# Patient Record
Sex: FEMALE | Race: WHITE | NOT HISPANIC OR LATINO | Employment: PART TIME | URBAN - METROPOLITAN AREA
[De-identification: names, ages, dates, MRNs, and addresses within clinical notes are randomized per-mention and may not be internally consistent; named-entity substitution may affect disease eponyms.]

---

## 2017-08-15 ENCOUNTER — ALLSCRIPTS OFFICE VISIT (OUTPATIENT)
Dept: OTHER | Facility: OTHER | Age: 42
End: 2017-08-15

## 2017-08-15 DIAGNOSIS — E78.2 MIXED HYPERLIPIDEMIA: ICD-10-CM

## 2018-01-12 NOTE — PROGRESS NOTES
Assessment    1  Back pain, chronic (724 5,338 29) (M54 9,G89 29)   2  Migraine headache (346 90) (G43 909)   3  Depression (311) (F32 9)   4  Mixed hyperlipidemia (272 2) (E78 2)    Plan  Anxiety disorder    · LORazepam 2 MG Oral Tablet (Ativan); TAKE 1 TABLET 4 TIMES DAILY   · Sertraline HCl - 100 MG Oral Tablet; TAKE 2 TABLETS EVERY DAY  Health Maintenance    · Follow-up visit in 6 months Evaluation and Treatment  Follow-up  Status: Complete   Done: [id number]  Mixed hyperlipidemia    · (1) CBC/PLT/DIFF; Status:Active; Requested for:66Beb6905;    · (1) COMPREHENSIVE METABOLIC PANEL; Status:Active; Requested for:54Orz0959;    · (1) LIPID PANEL, FASTING; Status:Active; Requested for:35Zrn9341;   Weight loss    · (Q) TSH, 3RD GENERATION; Status:Active; Requested for:23Pai8273;     Chief Complaint  Patient is here today for annual physical  No complaint of pain  Patient is requesting prescription for lorazepam       History of Present Illness  HPI: Patient comes in for a checkup  Review of Systems    Constitutional: No fever, no chills, feels well, no tiredness, no recent weight gain or weight loss  Cardiovascular: No complaints of slow heart rate, no fast heart rate, no chest pain, no palpitations, no leg claudication, no lower extremity edema  Respiratory: No complaints of shortness of breath, no wheezing, no cough, no SOB on exertion, no orthopnea, no PND  Active Problems    1  Acid reflux disease (530 81) (K21 9)   2  Anxiety disorder (300 00) (F41 9)   3  Back pain, chronic (724 5,338 29) (M54 9,G89 29)   4  Chronic pain (338 29) (G89 29)   5  Depression (311) (F32 9)   6  Encounter for screening mammogram for breast cancer (V76 12) (Z12 31)   7  Inguinal hernia (550 90) (K40 90)   8  Irritable bowel syndrome (564 1) (K58 9)   9  Migraine headache (346 90) (G43 909)   10  Mixed hyperlipidemia (272 2) (E78 2)   11  Multiple sclerosis (340) (G35)   12   Need for influenza vaccination (V04 81) (Z23) 13  Need for pneumococcal vaccination (V03 82) (Z23)   14  Need for Tdap vaccination (V06 1) (Z23)   15  Refractory migraine (346 81) (G43 919)   16  Unilateral femoral hernia without obstruction or gangrene (553 00) (K41 90)   17  Vaginal smear (V76 47) (Z12 72)   18  Visit for screening mammogram (V76 12) (Z12 31)   19  Weight loss (783 21) (R63 4)    Past Medical History    · History of Need for influenza vaccination (V04 81) (Z23)    Surgical History    · History of Eye Surgery    Family History  Mother    · Family history of coronary artery disease (V17 3) (Z82 49)   · Family history of myocardial infarction (V17 3) (Z82 49)   · Denied: Family history of substance abuse  Father    · Family history of malignant neoplasm of urinary bladder (V16 52) (Z80 52)   · Denied: Family history of mental disorder  Sister    · Family history of    · Family history of multiple sclerosis (V17 2) (Z82 0)    Social History    · Denied: History of Alcohol use   · Always uses seat belt   · Current every day smoker (305 1) (F17 200)   · Full-time employment   · Lives with    ·     Current Meds   1  LORazepam 2 MG Oral Tablet; TAKE 1 TABLET 4 TIMES DAILY; Therapy: 29YGI5923 to (Evaluate:00Szp2405)  Requested for: 33Jnu0337; Last   Rx:74Exr2087 Ordered   2  Sertraline HCl - 100 MG Oral Tablet; TAKE 2 TABLETS EVERY DAY; Therapy: 36XFW4603 to (Evaluate:96Pxj1079)  Requested for: 68Yfr9148; Last   Rx:89Nqh4203 Ordered   3  TraMADol HCl - 50 MG Oral Tablet; TAKE 1 TO 2 TABLETS EVERY 4 TO 6 HOURS AS   NEEDED; Therapy: 58CWF8647 to (Evaluate:22Wqr3785)  Requested for: 28EIU2113; Last   Rx:28Bbc1474 Ordered    Allergies    1   Sulfa Drugs    Vitals   Recorded: 2017 10:26AM   Heart Rate 64   Systolic 703   Diastolic 62   Height 4 ft 7 5 in   Weight 77 lb 2 oz   BMI Calculated 17 6   BSA Calculated 1 18   O2 Saturation 98     Physical Exam    Constitutional   General appearance: No acute distress, well appearing and well nourished  Eyes   Conjunctiva and lids: No swelling, erythema or discharge  Pupils and irises: Equal, round and reactive to light  Ears, Nose, Mouth, and Throat   External inspection of ears and nose: Normal     Otoscopic examination: Tympanic membranes translucent with normal light reflex  Canals patent without erythema  Oropharynx: Normal with no erythema, edema, exudate or lesions  Pulmonary   Respiratory effort: No increased work of breathing or signs of respiratory distress  Auscultation of lungs: Clear to auscultation  Cardiovascular   Auscultation of heart: Normal rate and rhythm, normal S1 and S2, without murmurs  Examination of extremities for edema and/or varicosities: Normal     Abdomen   Abdomen: Non-tender, no masses  Liver and spleen: No hepatomegaly or splenomegaly  Lymphatic   Palpation of lymph nodes in neck: No lymphadenopathy  Musculoskeletal   Gait and station: Normal     Digits and nails: Normal without clubbing or cyanosis  Inspection/palpation of joints, bones, and muscles: Normal     Skin   Skin and subcutaneous tissue: Normal without rashes or lesions  Neurologic   Cranial nerves: Cranial nerves 2-12 intact  Reflexes: 2+ and symmetric  Sensation: No sensory loss  Psychiatric   Orientation to person, place, and time: Normal     Mood and affect: Normal        Results/Data  PHQ-9 Adult Depression Screening 40Pbq9082 11:50AM User, s     Test Name Result Flag Reference   PHQ-9 Adult Depression Score 6     Over the last two weeks, how often have you been bothered by any of the following problems?   Little interest or pleasure in doing things: Not at all - 0  Feeling down, depressed, or hopeless: Several days - 1  Trouble falling or staying asleep, or sleeping too much: Nearly every day - 3  Feeling tired or having little energy: Not at all - 0  Poor appetite or over eating: Not at all - 0  Feeling bad about yourself - or that you are a failure or have let yourself or your family down: Several days - 1  Trouble concentrating on things, such as reading the newspaper or watching television: Not at all - 0  Moving or speaking so slowly that other people could have noticed  Or the opposite -  being so fidgety or restless that you have been moving around a lot more than usual: Several days - 1  Thoughts that you would be better off dead, or of hurting yourself in some way: Not at all - 0   PHQ-9 Adult Depression Screening Negative     PHQ-9 Difficulty Level Somewhat difficult     PHQ-9 Severity Mild Depression         Future Appointments    Date/Time Provider Specialty Site   02/13/2018 09:30 AM MICHAEL Toney   Family Medicine [address]     Signatures   Electronically signed by : MICHAEL Millan ; Aug 15 2017 12:21PM EST                       (Author)

## 2018-01-13 VITALS
BODY MASS INDEX: 17.35 KG/M2 | SYSTOLIC BLOOD PRESSURE: 100 MMHG | HEIGHT: 56 IN | HEART RATE: 64 BPM | DIASTOLIC BLOOD PRESSURE: 62 MMHG | OXYGEN SATURATION: 98 % | WEIGHT: 77.13 LBS

## 2018-02-08 ENCOUNTER — TELEPHONE (OUTPATIENT)
Dept: FAMILY MEDICINE CLINIC | Facility: CLINIC | Age: 43
End: 2018-02-08

## 2018-02-08 DIAGNOSIS — F41.9 ANXIETY: Primary | ICD-10-CM

## 2018-02-08 RX ORDER — LORAZEPAM 2 MG/1
2 TABLET ORAL 4 TIMES DAILY
Refills: 1 | COMMUNITY
Start: 2017-11-24 | End: 2018-02-08

## 2018-02-08 RX ORDER — LORAZEPAM 2 MG/1
2 TABLET ORAL 3 TIMES DAILY
Qty: 90 TABLET | Refills: 0 | Status: SHIPPED | OUTPATIENT
Start: 2018-02-08 | End: 2018-03-13 | Stop reason: SDUPTHER

## 2018-02-13 RX ORDER — SERTRALINE HYDROCHLORIDE 100 MG/1
200 TABLET, FILM COATED ORAL DAILY
Refills: 5 | COMMUNITY
Start: 2017-11-21 | End: 2018-05-01 | Stop reason: SDUPTHER

## 2018-02-13 RX ORDER — TRAMADOL HYDROCHLORIDE 50 MG/1
1-2 TABLET ORAL
COMMUNITY
Start: 2014-09-25 | End: 2018-05-01 | Stop reason: SDUPTHER

## 2018-03-13 DIAGNOSIS — F41.9 ANXIETY: ICD-10-CM

## 2018-03-13 RX ORDER — LORAZEPAM 2 MG/1
2 TABLET ORAL EVERY 6 HOURS PRN
Qty: 90 TABLET | Refills: 0 | Status: SHIPPED | OUTPATIENT
Start: 2018-03-13 | End: 2018-04-10 | Stop reason: SDUPTHER

## 2018-04-10 ENCOUNTER — OFFICE VISIT (OUTPATIENT)
Dept: FAMILY MEDICINE CLINIC | Facility: CLINIC | Age: 43
End: 2018-04-10
Payer: COMMERCIAL

## 2018-04-10 VITALS
HEART RATE: 75 BPM | WEIGHT: 71.4 LBS | HEIGHT: 56 IN | SYSTOLIC BLOOD PRESSURE: 112 MMHG | OXYGEN SATURATION: 97 % | DIASTOLIC BLOOD PRESSURE: 80 MMHG | BODY MASS INDEX: 16.06 KG/M2

## 2018-04-10 DIAGNOSIS — F32.A DEPRESSION, UNSPECIFIED DEPRESSION TYPE: ICD-10-CM

## 2018-04-10 DIAGNOSIS — Z72.0 TOBACCO USE: ICD-10-CM

## 2018-04-10 DIAGNOSIS — M54.5 CHRONIC LOW BACK PAIN, UNSPECIFIED BACK PAIN LATERALITY, WITH SCIATICA PRESENCE UNSPECIFIED: ICD-10-CM

## 2018-04-10 DIAGNOSIS — G43.909 MIGRAINE WITHOUT STATUS MIGRAINOSUS, NOT INTRACTABLE, UNSPECIFIED MIGRAINE TYPE: ICD-10-CM

## 2018-04-10 DIAGNOSIS — E78.2 MIXED HYPERLIPIDEMIA: ICD-10-CM

## 2018-04-10 DIAGNOSIS — G89.29 CHRONIC LOW BACK PAIN, UNSPECIFIED BACK PAIN LATERALITY, WITH SCIATICA PRESENCE UNSPECIFIED: ICD-10-CM

## 2018-04-10 DIAGNOSIS — K58.9 IRRITABLE BOWEL SYNDROME, UNSPECIFIED TYPE: ICD-10-CM

## 2018-04-10 DIAGNOSIS — F41.9 ANXIETY: ICD-10-CM

## 2018-04-10 DIAGNOSIS — R63.4 WEIGHT LOSS: Primary | ICD-10-CM

## 2018-04-10 PROCEDURE — 99214 OFFICE O/P EST MOD 30 MIN: CPT | Performed by: FAMILY MEDICINE

## 2018-04-10 RX ORDER — LORAZEPAM 2 MG/1
2 TABLET ORAL EVERY 6 HOURS PRN
Qty: 120 TABLET | Refills: 0 | Status: SHIPPED | OUTPATIENT
Start: 2018-04-10 | End: 2018-05-01 | Stop reason: SDUPTHER

## 2018-04-10 NOTE — PROGRESS NOTES
Assessment/Plan:           Problem List Items Addressed This Visit     Depression    Relevant Medications    LORazepam (ATIVAN) 2 mg tablet    Migraine headache    Mixed hyperlipidemia    Relevant Orders    Lipid panel    Back pain, chronic    Tobacco use    Weight loss - Primary    Relevant Orders    CBC and differential    Comprehensive metabolic panel    TSH, 3rd generation with T4 reflex    XR chest pa & lateral    Irritable bowel syndrome    Anxiety    Relevant Medications    LORazepam (ATIVAN) 2 mg tablet            Subjective:      Patient ID: Leta Burris is a 36 y o  female  Patient comes in for a checkup  She is losing weight and she is not sure why her anxiety and depression are stable as are her migraines  Her IBS is about the same        The following portions of the patient's history were reviewed and updated as appropriate: allergies, current medications, past family history, past medical history, past social history, past surgical history and problem list     Review of Systems   Constitutional: Negative  HENT: Negative  Respiratory: Negative  Cardiovascular: Negative  Objective:      /80   Pulse 75   Ht 4' 7 5" (1 41 m)   Wt 32 4 kg (71 lb 6 4 oz)   SpO2 97%   BMI 16 30 kg/m²          Physical Exam   Constitutional: She is oriented to person, place, and time  She appears well-developed  HENT:   Head: Normocephalic and atraumatic  Mouth/Throat: Oropharynx is clear and moist    Eyes: EOM are normal  Pupils are equal, round, and reactive to light  Neck: Neck supple  No thyromegaly present  Cardiovascular: Normal rate, regular rhythm and normal heart sounds  Pulmonary/Chest: Effort normal and breath sounds normal    Abdominal: Soft  Musculoskeletal: Normal range of motion  Lymphadenopathy:     She has no cervical adenopathy  Neurological: She is alert and oriented to person, place, and time  Skin: Skin is warm and dry     Psychiatric: She has a normal mood and affect  Nursing note and vitals reviewed

## 2018-04-10 NOTE — PATIENT INSTRUCTIONS
We will call the results of her testing  She will let us know if she develops other constitutional symptoms with regard to her weight loss

## 2018-04-10 NOTE — PROGRESS NOTES
Assessment/Plan:    No problem-specific Assessment & Plan notes found for this encounter  {Assess/PlanSmartLinks:03938}      Subjective:      Patient ID: Dylon Briones is a 36 y o  female      HPI    {Common ambulatory SmartLinks:72489}    Review of Systems      Objective:      /80   Pulse 75   Ht 4' 7 5" (1 41 m)   Wt 32 4 kg (71 lb 6 4 oz)   SpO2 97%   BMI 16 30 kg/m²          Physical Exam

## 2018-04-24 ENCOUNTER — TELEPHONE (OUTPATIENT)
Dept: FAMILY MEDICINE CLINIC | Facility: CLINIC | Age: 43
End: 2018-04-24

## 2018-04-24 NOTE — TELEPHONE ENCOUNTER
We scheduled an appointment for next Tuesday  She missed her last appointment because she was so sick  She had blood in her stool and it was tarry black  It is clearing up now  Is there any additional labs you want done before her appt on Tuesday?   She said she has had this problem in the past

## 2018-05-01 ENCOUNTER — OFFICE VISIT (OUTPATIENT)
Dept: FAMILY MEDICINE CLINIC | Facility: CLINIC | Age: 43
End: 2018-05-01
Payer: COMMERCIAL

## 2018-05-01 VITALS
DIASTOLIC BLOOD PRESSURE: 80 MMHG | SYSTOLIC BLOOD PRESSURE: 110 MMHG | HEART RATE: 80 BPM | OXYGEN SATURATION: 94 % | WEIGHT: 71 LBS | TEMPERATURE: 99.2 F | BODY MASS INDEX: 15.97 KG/M2 | HEIGHT: 56 IN

## 2018-05-01 DIAGNOSIS — K62.5 BLOOD PER RECTUM: ICD-10-CM

## 2018-05-01 DIAGNOSIS — M54.5 CHRONIC LOW BACK PAIN, UNSPECIFIED BACK PAIN LATERALITY, WITH SCIATICA PRESENCE UNSPECIFIED: Primary | ICD-10-CM

## 2018-05-01 DIAGNOSIS — N91.2 AMENORRHEA: ICD-10-CM

## 2018-05-01 DIAGNOSIS — G89.29 CHRONIC LOW BACK PAIN, UNSPECIFIED BACK PAIN LATERALITY, WITH SCIATICA PRESENCE UNSPECIFIED: Primary | ICD-10-CM

## 2018-05-01 DIAGNOSIS — F41.9 ANXIETY: ICD-10-CM

## 2018-05-01 PROCEDURE — 3008F BODY MASS INDEX DOCD: CPT | Performed by: FAMILY MEDICINE

## 2018-05-01 PROCEDURE — 99214 OFFICE O/P EST MOD 30 MIN: CPT | Performed by: FAMILY MEDICINE

## 2018-05-01 RX ORDER — LORAZEPAM 2 MG/1
2 TABLET ORAL EVERY 6 HOURS PRN
Qty: 120 TABLET | Refills: 0 | Status: SHIPPED | OUTPATIENT
Start: 2018-05-01 | End: 2018-06-19 | Stop reason: SDUPTHER

## 2018-05-01 RX ORDER — SERTRALINE HYDROCHLORIDE 100 MG/1
200 TABLET, FILM COATED ORAL DAILY
Qty: 60 TABLET | Refills: 5 | Status: SHIPPED | OUTPATIENT
Start: 2018-05-01 | End: 2018-12-06 | Stop reason: SDUPTHER

## 2018-05-01 RX ORDER — TRAMADOL HYDROCHLORIDE 50 MG/1
TABLET ORAL
Qty: 180 TABLET | Refills: 0 | Status: SHIPPED | OUTPATIENT
Start: 2018-05-01 | End: 2018-06-19 | Stop reason: SDUPTHER

## 2018-05-01 NOTE — PROGRESS NOTES
Assessment/Plan:           Problem List Items Addressed This Visit     Back pain, chronic - Primary    Relevant Medications    traMADol (ULTRAM) 50 mg tablet    Anxiety    Relevant Medications    LORazepam (ATIVAN) 2 mg tablet    sertraline (ZOLOFT) 100 mg tablet    Blood per rectum    Relevant Orders    Ambulatory referral to Gastroenterology    Amenorrhea    Relevant Orders    FSH and LH    Pregnancy Test (HCG Qualitative)            Subjective:      Patient ID: Ai Villegas is a 36 y o  female  Patient comes in after having a 3 history of bleeding per rectum with loose stools then black tar his stools  She has had similar problems in the past   She also notes a 3 month history of amenorrhea        The following portions of the patient's history were reviewed and updated as appropriate: allergies, current medications, past family history, past medical history, past social history, past surgical history and problem list     Review of Systems   Constitutional: Negative  HENT: Negative  Respiratory: Negative  Cardiovascular: Negative  Objective:      /80   Pulse 80   Temp 99 2 °F (37 3 °C)   Ht 4' 7 5" (1 41 m)   Wt 32 2 kg (71 lb)   SpO2 94%   BMI 16 21 kg/m²          Physical Exam   Constitutional: She is oriented to person, place, and time  She appears well-developed  HENT:   Head: Normocephalic and atraumatic  Mouth/Throat: Oropharynx is clear and moist    Eyes: EOM are normal  Pupils are equal, round, and reactive to light  Neck: Neck supple  Cardiovascular: Normal rate, regular rhythm and normal heart sounds  Pulmonary/Chest: Effort normal and breath sounds normal    Abdominal: Soft  Bowel sounds are normal  There is tenderness ( epigastrium)  Musculoskeletal: Normal range of motion  Lymphadenopathy:     She has no cervical adenopathy  Neurological: She is alert and oriented to person, place, and time  Skin: Skin is warm and dry     Psychiatric: She has a normal mood and affect

## 2018-06-19 DIAGNOSIS — G89.29 CHRONIC LOW BACK PAIN, UNSPECIFIED BACK PAIN LATERALITY, WITH SCIATICA PRESENCE UNSPECIFIED: ICD-10-CM

## 2018-06-19 DIAGNOSIS — F41.9 ANXIETY: ICD-10-CM

## 2018-06-19 DIAGNOSIS — M54.5 CHRONIC LOW BACK PAIN, UNSPECIFIED BACK PAIN LATERALITY, WITH SCIATICA PRESENCE UNSPECIFIED: ICD-10-CM

## 2018-06-19 RX ORDER — TRAMADOL HYDROCHLORIDE 50 MG/1
TABLET ORAL
Qty: 180 TABLET | Refills: 0 | Status: SHIPPED | OUTPATIENT
Start: 2018-06-19 | End: 2018-06-21 | Stop reason: SDUPTHER

## 2018-06-19 RX ORDER — LORAZEPAM 2 MG/1
2 TABLET ORAL EVERY 6 HOURS PRN
Qty: 120 TABLET | Refills: 0 | Status: SHIPPED | OUTPATIENT
Start: 2018-06-19 | End: 2018-06-21 | Stop reason: SDUPTHER

## 2018-06-21 DIAGNOSIS — M54.5 CHRONIC LOW BACK PAIN, UNSPECIFIED BACK PAIN LATERALITY, WITH SCIATICA PRESENCE UNSPECIFIED: ICD-10-CM

## 2018-06-21 DIAGNOSIS — G89.29 CHRONIC LOW BACK PAIN, UNSPECIFIED BACK PAIN LATERALITY, WITH SCIATICA PRESENCE UNSPECIFIED: ICD-10-CM

## 2018-06-21 DIAGNOSIS — F41.9 ANXIETY: ICD-10-CM

## 2018-06-21 RX ORDER — TRAMADOL HYDROCHLORIDE 50 MG/1
TABLET ORAL
Qty: 30 TABLET | Refills: 0 | Status: SHIPPED | OUTPATIENT
Start: 2018-06-21 | End: 2018-08-29 | Stop reason: SDUPTHER

## 2018-06-21 RX ORDER — LORAZEPAM 2 MG/1
2 TABLET ORAL EVERY 6 HOURS PRN
Qty: 20 TABLET | Refills: 0 | Status: SHIPPED | OUTPATIENT
Start: 2018-06-21 | End: 2018-07-31 | Stop reason: SDUPTHER

## 2018-06-28 ENCOUNTER — TELEPHONE (OUTPATIENT)
Dept: FAMILY MEDICINE CLINIC | Facility: CLINIC | Age: 43
End: 2018-06-28

## 2018-06-28 NOTE — TELEPHONE ENCOUNTER
PT is having difficulties arranging transportation to here to  her rx's  Wondering if you could escribe instead?     Lorazepam and tramodol

## 2018-06-29 ENCOUNTER — TELEPHONE (OUTPATIENT)
Dept: FAMILY MEDICINE CLINIC | Facility: CLINIC | Age: 43
End: 2018-06-29

## 2018-07-31 DIAGNOSIS — F41.9 ANXIETY: ICD-10-CM

## 2018-07-31 RX ORDER — LORAZEPAM 2 MG/1
2 TABLET ORAL EVERY 6 HOURS PRN
Qty: 120 TABLET | Refills: 0 | Status: SHIPPED | OUTPATIENT
Start: 2018-07-31 | End: 2018-09-05 | Stop reason: SDUPTHER

## 2018-08-29 DIAGNOSIS — M54.5 CHRONIC LOW BACK PAIN, UNSPECIFIED BACK PAIN LATERALITY, WITH SCIATICA PRESENCE UNSPECIFIED: ICD-10-CM

## 2018-08-29 DIAGNOSIS — G89.29 CHRONIC LOW BACK PAIN, UNSPECIFIED BACK PAIN LATERALITY, WITH SCIATICA PRESENCE UNSPECIFIED: ICD-10-CM

## 2018-08-30 RX ORDER — TRAMADOL HYDROCHLORIDE 50 MG/1
TABLET ORAL
Qty: 30 TABLET | Refills: 0 | Status: SHIPPED | OUTPATIENT
Start: 2018-08-30 | End: 2018-09-05 | Stop reason: SDUPTHER

## 2018-09-05 DIAGNOSIS — F41.9 ANXIETY: ICD-10-CM

## 2018-09-05 DIAGNOSIS — G89.29 CHRONIC LOW BACK PAIN, UNSPECIFIED BACK PAIN LATERALITY, WITH SCIATICA PRESENCE UNSPECIFIED: ICD-10-CM

## 2018-09-05 DIAGNOSIS — M54.5 CHRONIC LOW BACK PAIN, UNSPECIFIED BACK PAIN LATERALITY, WITH SCIATICA PRESENCE UNSPECIFIED: ICD-10-CM

## 2018-09-06 RX ORDER — LORAZEPAM 2 MG/1
2 TABLET ORAL EVERY 6 HOURS PRN
Qty: 120 TABLET | Refills: 0 | Status: SHIPPED | OUTPATIENT
Start: 2018-09-06 | End: 2018-10-18 | Stop reason: SDUPTHER

## 2018-09-06 RX ORDER — TRAMADOL HYDROCHLORIDE 50 MG/1
TABLET ORAL
Qty: 30 TABLET | Refills: 0 | Status: SHIPPED | OUTPATIENT
Start: 2018-09-06 | End: 2018-09-18 | Stop reason: SDUPTHER

## 2018-09-18 DIAGNOSIS — G89.29 CHRONIC LOW BACK PAIN, UNSPECIFIED BACK PAIN LATERALITY, WITH SCIATICA PRESENCE UNSPECIFIED: ICD-10-CM

## 2018-09-18 DIAGNOSIS — M54.5 CHRONIC LOW BACK PAIN, UNSPECIFIED BACK PAIN LATERALITY, WITH SCIATICA PRESENCE UNSPECIFIED: ICD-10-CM

## 2018-09-18 RX ORDER — TRAMADOL HYDROCHLORIDE 50 MG/1
TABLET ORAL
Qty: 30 TABLET | Refills: 0 | Status: SHIPPED | OUTPATIENT
Start: 2018-09-18 | End: 2018-09-27 | Stop reason: SDUPTHER

## 2018-09-27 DIAGNOSIS — M54.5 CHRONIC LOW BACK PAIN, UNSPECIFIED BACK PAIN LATERALITY, WITH SCIATICA PRESENCE UNSPECIFIED: ICD-10-CM

## 2018-09-27 DIAGNOSIS — G89.29 CHRONIC LOW BACK PAIN, UNSPECIFIED BACK PAIN LATERALITY, WITH SCIATICA PRESENCE UNSPECIFIED: ICD-10-CM

## 2018-09-27 RX ORDER — TRAMADOL HYDROCHLORIDE 50 MG/1
TABLET ORAL
Qty: 30 TABLET | Refills: 0 | Status: SHIPPED | OUTPATIENT
Start: 2018-09-27 | End: 2018-10-10 | Stop reason: SDUPTHER

## 2018-10-09 DIAGNOSIS — G89.29 CHRONIC LOW BACK PAIN, UNSPECIFIED BACK PAIN LATERALITY, WITH SCIATICA PRESENCE UNSPECIFIED: ICD-10-CM

## 2018-10-09 DIAGNOSIS — M54.5 CHRONIC LOW BACK PAIN, UNSPECIFIED BACK PAIN LATERALITY, WITH SCIATICA PRESENCE UNSPECIFIED: ICD-10-CM

## 2018-10-09 RX ORDER — TRAMADOL HYDROCHLORIDE 50 MG/1
TABLET ORAL
Qty: 30 TABLET | Refills: 0 | OUTPATIENT
Start: 2018-10-09

## 2018-10-10 DIAGNOSIS — M54.5 CHRONIC LOW BACK PAIN, UNSPECIFIED BACK PAIN LATERALITY, WITH SCIATICA PRESENCE UNSPECIFIED: ICD-10-CM

## 2018-10-10 DIAGNOSIS — G89.29 CHRONIC LOW BACK PAIN, UNSPECIFIED BACK PAIN LATERALITY, WITH SCIATICA PRESENCE UNSPECIFIED: ICD-10-CM

## 2018-10-10 RX ORDER — TRAMADOL HYDROCHLORIDE 50 MG/1
TABLET ORAL
Qty: 5 TABLET | Refills: 0 | Status: SHIPPED | OUTPATIENT
Start: 2018-10-10 | End: 2018-10-11 | Stop reason: SDUPTHER

## 2018-10-10 NOTE — TELEPHONE ENCOUNTER
Patient made an appt , but is out of Tramadol , can you give her a little- appt 10/18/18? She is out today and BD is gone for the day would you do?   [facility]

## 2018-10-11 DIAGNOSIS — G89.29 CHRONIC LOW BACK PAIN, UNSPECIFIED BACK PAIN LATERALITY, WITH SCIATICA PRESENCE UNSPECIFIED: ICD-10-CM

## 2018-10-11 DIAGNOSIS — M54.5 CHRONIC LOW BACK PAIN, UNSPECIFIED BACK PAIN LATERALITY, WITH SCIATICA PRESENCE UNSPECIFIED: ICD-10-CM

## 2018-10-11 RX ORDER — TRAMADOL HYDROCHLORIDE 50 MG/1
TABLET ORAL
Qty: 100 TABLET | Refills: 0 | Status: SHIPPED | OUTPATIENT
Start: 2018-10-11 | End: 2018-11-12 | Stop reason: SDUPTHER

## 2018-10-11 RX ORDER — TRAMADOL HYDROCHLORIDE 50 MG/1
TABLET ORAL
Qty: 60 TABLET | Refills: 0 | Status: CANCELLED | OUTPATIENT
Start: 2018-10-11

## 2018-10-18 ENCOUNTER — OFFICE VISIT (OUTPATIENT)
Dept: FAMILY MEDICINE CLINIC | Facility: CLINIC | Age: 43
End: 2018-10-18
Payer: COMMERCIAL

## 2018-10-18 VITALS
TEMPERATURE: 98.4 F | DIASTOLIC BLOOD PRESSURE: 58 MMHG | OXYGEN SATURATION: 94 % | SYSTOLIC BLOOD PRESSURE: 112 MMHG | HEART RATE: 100 BPM | WEIGHT: 73 LBS | BODY MASS INDEX: 16.42 KG/M2 | HEIGHT: 56 IN

## 2018-10-18 DIAGNOSIS — F41.9 ANXIETY: Primary | ICD-10-CM

## 2018-10-18 DIAGNOSIS — G43.909 MIGRAINE WITHOUT STATUS MIGRAINOSUS, NOT INTRACTABLE, UNSPECIFIED MIGRAINE TYPE: ICD-10-CM

## 2018-10-18 DIAGNOSIS — G35 MULTIPLE SCLEROSIS (HCC): ICD-10-CM

## 2018-10-18 DIAGNOSIS — F32.A DEPRESSION, UNSPECIFIED DEPRESSION TYPE: ICD-10-CM

## 2018-10-18 DIAGNOSIS — G89.29 CHRONIC LOW BACK PAIN, UNSPECIFIED BACK PAIN LATERALITY, WITH SCIATICA PRESENCE UNSPECIFIED: ICD-10-CM

## 2018-10-18 DIAGNOSIS — Z72.0 TOBACCO USE: ICD-10-CM

## 2018-10-18 DIAGNOSIS — M54.5 CHRONIC LOW BACK PAIN, UNSPECIFIED BACK PAIN LATERALITY, WITH SCIATICA PRESENCE UNSPECIFIED: ICD-10-CM

## 2018-10-18 PROCEDURE — 99214 OFFICE O/P EST MOD 30 MIN: CPT | Performed by: FAMILY MEDICINE

## 2018-10-18 PROCEDURE — 3008F BODY MASS INDEX DOCD: CPT | Performed by: FAMILY MEDICINE

## 2018-10-18 RX ORDER — MELATONIN/PYRIDOXINE HCL (B6) 10 MG-10MG
1 TABLET,IMMED, EXTENDED RELEASE, BIPHASIC ORAL
COMMUNITY
End: 2019-10-21

## 2018-10-18 RX ORDER — LORAZEPAM 2 MG/1
2 TABLET ORAL EVERY 6 HOURS PRN
Qty: 120 TABLET | Refills: 0 | Status: SHIPPED | OUTPATIENT
Start: 2018-10-18 | End: 2018-11-28 | Stop reason: SDUPTHER

## 2018-10-18 NOTE — PROGRESS NOTES
Assessment/Plan:    Return visit in 6 months  She is to do blood work previously prescribed  She refuses mammogram and gynecologic exam       Problem List Items Addressed This Visit     Depression     Continue Zoloft 100 mg daily         Relevant Medications    LORazepam (ATIVAN) 2 mg tablet    Migraine headache    Back pain, chronic    Tobacco use     Smoking cessation recommended         Anxiety - Primary    Relevant Medications    LORazepam (ATIVAN) 2 mg tablet    Multiple sclerosis [facility])     Neurology evaluation is suggested                 Subjective:      Patient ID: Hima Wells is a 36 y o  female  Patient comes in for a checkup  She continues to take tramadol 50 mg 2-3 times daily for chronic back and leg pain  She feels the leg pain is from [address] that she had years ago  She is taking Ativan 2 mg t i d  for her chronic anxiety  She is having migraines 1-2 times per month for which she takes tramadol with good relief        The following portions of the patient's history were reviewed and updated as appropriate: allergies, current medications, past family history, past medical history, past social history, past surgical history and problem list     Review of Systems   Constitutional: Negative  HENT: Negative  Respiratory: Negative  Cardiovascular: Negative  Objective:      /58   Pulse 100   Temp 98 4 °F (36 9 °C)   Ht 4' 7 5" (1 41 m)   Wt 33 1 kg (73 lb)   SpO2 94%   BMI 16 66 kg/m²          Physical Exam   Constitutional: She is oriented to person, place, and time  She appears well-developed  HENT:   Head: Normocephalic and atraumatic  Mouth/Throat: Oropharynx is clear and moist    Eyes: Pupils are equal, round, and reactive to light  EOM are normal    Neck: Neck supple  No thyromegaly present  Cardiovascular: Normal rate, regular rhythm and normal heart sounds  Pulmonary/Chest: Effort normal and breath sounds normal    Abdominal: Soft     Musculoskeletal: Normal range of motion  Lymphadenopathy:     She has no cervical adenopathy  Neurological: She is alert and oriented to person, place, and time  Skin: Skin is warm and dry  Psychiatric: She has a normal mood and affect

## 2018-11-12 DIAGNOSIS — G89.29 CHRONIC LOW BACK PAIN, UNSPECIFIED BACK PAIN LATERALITY, WITH SCIATICA PRESENCE UNSPECIFIED: ICD-10-CM

## 2018-11-12 DIAGNOSIS — M54.5 CHRONIC LOW BACK PAIN, UNSPECIFIED BACK PAIN LATERALITY, WITH SCIATICA PRESENCE UNSPECIFIED: ICD-10-CM

## 2018-11-12 RX ORDER — TRAMADOL HYDROCHLORIDE 50 MG/1
TABLET ORAL
Qty: 100 TABLET | Refills: 0 | Status: SHIPPED | OUTPATIENT
Start: 2018-11-12 | End: 2018-12-18 | Stop reason: SDUPTHER

## 2018-11-28 DIAGNOSIS — F41.9 ANXIETY: ICD-10-CM

## 2018-11-28 RX ORDER — LORAZEPAM 2 MG/1
2 TABLET ORAL EVERY 6 HOURS PRN
Qty: 120 TABLET | Refills: 0 | Status: SHIPPED | OUTPATIENT
Start: 2018-11-28 | End: 2019-01-07 | Stop reason: SDUPTHER

## 2018-11-28 NOTE — TELEPHONE ENCOUNTER
rf Lorazepam -    PDMP  10/18/2018  1  10/18/2018  LORAZEPAM 2 MG TABLET  120 0  30   [facility]

## 2018-12-06 DIAGNOSIS — F41.9 ANXIETY: ICD-10-CM

## 2018-12-06 RX ORDER — SERTRALINE HYDROCHLORIDE 100 MG/1
200 TABLET, FILM COATED ORAL DAILY
Qty: 60 TABLET | Refills: 3 | Status: SHIPPED | OUTPATIENT
Start: 2018-12-06 | End: 2019-04-17 | Stop reason: SDUPTHER

## 2018-12-18 ENCOUNTER — TELEPHONE (OUTPATIENT)
Dept: FAMILY MEDICINE CLINIC | Facility: CLINIC | Age: 43
End: 2018-12-18

## 2018-12-18 DIAGNOSIS — G89.29 CHRONIC LOW BACK PAIN, UNSPECIFIED BACK PAIN LATERALITY, WITH SCIATICA PRESENCE UNSPECIFIED: ICD-10-CM

## 2018-12-18 DIAGNOSIS — M54.5 CHRONIC LOW BACK PAIN, UNSPECIFIED BACK PAIN LATERALITY, WITH SCIATICA PRESENCE UNSPECIFIED: ICD-10-CM

## 2018-12-18 RX ORDER — TRAMADOL HYDROCHLORIDE 50 MG/1
TABLET ORAL
Qty: 100 TABLET | Refills: 0 | Status: SHIPPED | OUTPATIENT
Start: 2018-12-18 | End: 2019-01-15 | Stop reason: SDUPTHER

## 2019-01-07 ENCOUNTER — TELEPHONE (OUTPATIENT)
Dept: FAMILY MEDICINE CLINIC | Facility: CLINIC | Age: 44
End: 2019-01-07

## 2019-01-07 DIAGNOSIS — F41.9 ANXIETY: ICD-10-CM

## 2019-01-07 RX ORDER — LORAZEPAM 2 MG/1
2 TABLET ORAL EVERY 6 HOURS PRN
Qty: 120 TABLET | Refills: 0 | Status: SHIPPED | OUTPATIENT
Start: 2019-01-07 | End: 2019-02-13 | Stop reason: SDUPTHER

## 2019-01-15 DIAGNOSIS — M54.5 CHRONIC LOW BACK PAIN, UNSPECIFIED BACK PAIN LATERALITY, WITH SCIATICA PRESENCE UNSPECIFIED: ICD-10-CM

## 2019-01-15 DIAGNOSIS — G89.29 CHRONIC LOW BACK PAIN, UNSPECIFIED BACK PAIN LATERALITY, WITH SCIATICA PRESENCE UNSPECIFIED: ICD-10-CM

## 2019-01-15 RX ORDER — TRAMADOL HYDROCHLORIDE 50 MG/1
TABLET ORAL
Qty: 100 TABLET | Refills: 0 | Status: SHIPPED | OUTPATIENT
Start: 2019-01-15 | End: 2019-02-13 | Stop reason: SDUPTHER

## 2019-02-13 DIAGNOSIS — F41.9 ANXIETY: ICD-10-CM

## 2019-02-13 DIAGNOSIS — M54.5 CHRONIC LOW BACK PAIN, UNSPECIFIED BACK PAIN LATERALITY, WITH SCIATICA PRESENCE UNSPECIFIED: ICD-10-CM

## 2019-02-13 DIAGNOSIS — G89.29 CHRONIC LOW BACK PAIN, UNSPECIFIED BACK PAIN LATERALITY, WITH SCIATICA PRESENCE UNSPECIFIED: ICD-10-CM

## 2019-02-13 RX ORDER — LORAZEPAM 2 MG/1
2 TABLET ORAL EVERY 6 HOURS PRN
Qty: 120 TABLET | Refills: 0 | Status: SHIPPED | OUTPATIENT
Start: 2019-02-13 | End: 2019-03-20 | Stop reason: SDUPTHER

## 2019-02-13 RX ORDER — TRAMADOL HYDROCHLORIDE 50 MG/1
TABLET ORAL
Qty: 100 TABLET | Refills: 0 | Status: SHIPPED | OUTPATIENT
Start: 2019-02-13 | End: 2019-03-20 | Stop reason: SDUPTHER

## 2019-03-20 ENCOUNTER — TELEPHONE (OUTPATIENT)
Dept: FAMILY MEDICINE CLINIC | Facility: CLINIC | Age: 44
End: 2019-03-20

## 2019-03-20 DIAGNOSIS — F41.9 ANXIETY: ICD-10-CM

## 2019-03-20 DIAGNOSIS — G89.29 CHRONIC LOW BACK PAIN, UNSPECIFIED BACK PAIN LATERALITY, WITH SCIATICA PRESENCE UNSPECIFIED: ICD-10-CM

## 2019-03-20 DIAGNOSIS — M54.5 CHRONIC LOW BACK PAIN, UNSPECIFIED BACK PAIN LATERALITY, WITH SCIATICA PRESENCE UNSPECIFIED: ICD-10-CM

## 2019-03-20 RX ORDER — TRAMADOL HYDROCHLORIDE 50 MG/1
TABLET ORAL
Qty: 100 TABLET | Refills: 0 | Status: SHIPPED | OUTPATIENT
Start: 2019-03-20 | End: 2019-04-22 | Stop reason: SDUPTHER

## 2019-03-20 RX ORDER — LORAZEPAM 2 MG/1
2 TABLET ORAL EVERY 6 HOURS PRN
Qty: 120 TABLET | Refills: 0 | Status: SHIPPED | OUTPATIENT
Start: 2019-03-20 | End: 2019-04-22 | Stop reason: SDUPTHER

## 2019-04-17 DIAGNOSIS — F41.9 ANXIETY: ICD-10-CM

## 2019-04-17 RX ORDER — SERTRALINE HYDROCHLORIDE 100 MG/1
200 TABLET, FILM COATED ORAL DAILY
Qty: 60 TABLET | Refills: 3 | Status: SHIPPED | OUTPATIENT
Start: 2019-04-17 | End: 2019-10-03 | Stop reason: SDUPTHER

## 2019-04-18 ENCOUNTER — TELEPHONE (OUTPATIENT)
Dept: OTHER | Facility: OTHER | Age: 44
End: 2019-04-18

## 2019-04-22 ENCOUNTER — TELEPHONE (OUTPATIENT)
Dept: FAMILY MEDICINE CLINIC | Facility: CLINIC | Age: 44
End: 2019-04-22

## 2019-04-22 DIAGNOSIS — M54.5 CHRONIC LOW BACK PAIN, UNSPECIFIED BACK PAIN LATERALITY, WITH SCIATICA PRESENCE UNSPECIFIED: ICD-10-CM

## 2019-04-22 DIAGNOSIS — F41.9 ANXIETY: ICD-10-CM

## 2019-04-22 DIAGNOSIS — G89.29 CHRONIC LOW BACK PAIN, UNSPECIFIED BACK PAIN LATERALITY, WITH SCIATICA PRESENCE UNSPECIFIED: ICD-10-CM

## 2019-04-22 RX ORDER — LORAZEPAM 2 MG/1
2 TABLET ORAL EVERY 6 HOURS PRN
Qty: 120 TABLET | Refills: 0 | Status: SHIPPED | OUTPATIENT
Start: 2019-04-22 | End: 2019-05-23 | Stop reason: SDUPTHER

## 2019-04-22 RX ORDER — TRAMADOL HYDROCHLORIDE 50 MG/1
TABLET ORAL
Qty: 100 TABLET | Refills: 0 | Status: SHIPPED | OUTPATIENT
Start: 2019-04-22 | End: 2019-04-22 | Stop reason: SDUPTHER

## 2019-04-23 RX ORDER — TRAMADOL HYDROCHLORIDE 50 MG/1
TABLET ORAL
Qty: 100 TABLET | Refills: 0 | Status: SHIPPED | OUTPATIENT
Start: 2019-04-23 | End: 2019-05-23 | Stop reason: SDUPTHER

## 2019-05-16 ENCOUNTER — OFFICE VISIT (OUTPATIENT)
Dept: FAMILY MEDICINE CLINIC | Facility: CLINIC | Age: 44
End: 2019-05-16
Payer: COMMERCIAL

## 2019-05-16 VITALS
SYSTOLIC BLOOD PRESSURE: 110 MMHG | OXYGEN SATURATION: 94 % | WEIGHT: 78 LBS | BODY MASS INDEX: 17.55 KG/M2 | HEIGHT: 56 IN | TEMPERATURE: 98.5 F | RESPIRATION RATE: 16 BRPM | DIASTOLIC BLOOD PRESSURE: 66 MMHG | HEART RATE: 77 BPM

## 2019-05-16 DIAGNOSIS — E78.2 MIXED HYPERLIPIDEMIA: ICD-10-CM

## 2019-05-16 DIAGNOSIS — F41.9 ANXIETY: ICD-10-CM

## 2019-05-16 DIAGNOSIS — G43.909 MIGRAINE WITHOUT STATUS MIGRAINOSUS, NOT INTRACTABLE, UNSPECIFIED MIGRAINE TYPE: ICD-10-CM

## 2019-05-16 DIAGNOSIS — Z12.39 SCREENING FOR BREAST CANCER: Primary | ICD-10-CM

## 2019-05-16 DIAGNOSIS — M54.5 CHRONIC LOW BACK PAIN, UNSPECIFIED BACK PAIN LATERALITY, WITH SCIATICA PRESENCE UNSPECIFIED: ICD-10-CM

## 2019-05-16 DIAGNOSIS — G89.29 CHRONIC LOW BACK PAIN, UNSPECIFIED BACK PAIN LATERALITY, WITH SCIATICA PRESENCE UNSPECIFIED: ICD-10-CM

## 2019-05-16 DIAGNOSIS — F32.A DEPRESSION, UNSPECIFIED DEPRESSION TYPE: ICD-10-CM

## 2019-05-16 DIAGNOSIS — R10.32 LEFT LOWER QUADRANT PAIN: ICD-10-CM

## 2019-05-16 DIAGNOSIS — Z20.2 POSSIBLE EXPOSURE TO STD: ICD-10-CM

## 2019-05-16 PROCEDURE — 3008F BODY MASS INDEX DOCD: CPT | Performed by: FAMILY MEDICINE

## 2019-05-16 PROCEDURE — 99214 OFFICE O/P EST MOD 30 MIN: CPT | Performed by: FAMILY MEDICINE

## 2019-05-23 ENCOUNTER — TELEPHONE (OUTPATIENT)
Dept: FAMILY MEDICINE CLINIC | Facility: CLINIC | Age: 44
End: 2019-05-23

## 2019-05-23 DIAGNOSIS — G89.29 CHRONIC LOW BACK PAIN, UNSPECIFIED BACK PAIN LATERALITY, WITH SCIATICA PRESENCE UNSPECIFIED: ICD-10-CM

## 2019-05-23 DIAGNOSIS — F41.9 ANXIETY: ICD-10-CM

## 2019-05-23 DIAGNOSIS — M54.5 CHRONIC LOW BACK PAIN, UNSPECIFIED BACK PAIN LATERALITY, WITH SCIATICA PRESENCE UNSPECIFIED: ICD-10-CM

## 2019-05-23 RX ORDER — TRAMADOL HYDROCHLORIDE 50 MG/1
TABLET ORAL
Qty: 100 TABLET | Refills: 0 | Status: SHIPPED | OUTPATIENT
Start: 2019-05-23 | End: 2019-06-14 | Stop reason: SDUPTHER

## 2019-05-23 RX ORDER — LORAZEPAM 2 MG/1
2 TABLET ORAL EVERY 6 HOURS PRN
Qty: 120 TABLET | Refills: 0 | Status: SHIPPED | OUTPATIENT
Start: 2019-05-23 | End: 2019-07-09 | Stop reason: SDUPTHER

## 2019-06-14 DIAGNOSIS — M54.5 CHRONIC LOW BACK PAIN, UNSPECIFIED BACK PAIN LATERALITY, WITH SCIATICA PRESENCE UNSPECIFIED: ICD-10-CM

## 2019-06-14 DIAGNOSIS — G89.29 CHRONIC LOW BACK PAIN, UNSPECIFIED BACK PAIN LATERALITY, WITH SCIATICA PRESENCE UNSPECIFIED: ICD-10-CM

## 2019-06-14 RX ORDER — TRAMADOL HYDROCHLORIDE 50 MG/1
TABLET ORAL
Qty: 100 TABLET | Refills: 0 | Status: SHIPPED | OUTPATIENT
Start: 2019-06-14 | End: 2019-07-09 | Stop reason: SDUPTHER

## 2019-07-09 ENCOUNTER — TELEPHONE (OUTPATIENT)
Dept: FAMILY MEDICINE CLINIC | Facility: CLINIC | Age: 44
End: 2019-07-09

## 2019-07-09 DIAGNOSIS — F41.9 ANXIETY: ICD-10-CM

## 2019-07-09 DIAGNOSIS — G89.29 CHRONIC LOW BACK PAIN, UNSPECIFIED BACK PAIN LATERALITY, WITH SCIATICA PRESENCE UNSPECIFIED: ICD-10-CM

## 2019-07-09 DIAGNOSIS — M54.5 CHRONIC LOW BACK PAIN, UNSPECIFIED BACK PAIN LATERALITY, WITH SCIATICA PRESENCE UNSPECIFIED: ICD-10-CM

## 2019-07-09 RX ORDER — LORAZEPAM 2 MG/1
2 TABLET ORAL EVERY 6 HOURS PRN
Qty: 120 TABLET | Refills: 0 | Status: SHIPPED | OUTPATIENT
Start: 2019-07-09 | End: 2019-07-10 | Stop reason: SDUPTHER

## 2019-07-09 RX ORDER — TRAMADOL HYDROCHLORIDE 50 MG/1
TABLET ORAL
Qty: 100 TABLET | Refills: 0 | Status: SHIPPED | OUTPATIENT
Start: 2019-07-09 | End: 2019-07-11 | Stop reason: SDUPTHER

## 2019-07-09 NOTE — TELEPHONE ENCOUNTER
Pt called and needs for her lorazepam and the tramadol  [id number] [address], [place] [address]  b-[phone]  Site check-ok

## 2019-07-10 ENCOUNTER — TELEPHONE (OUTPATIENT)
Dept: FAMILY MEDICINE CLINIC | Facility: CLINIC | Age: 44
End: 2019-07-10

## 2019-07-10 DIAGNOSIS — G89.29 CHRONIC LOW BACK PAIN, UNSPECIFIED BACK PAIN LATERALITY, WITH SCIATICA PRESENCE UNSPECIFIED: ICD-10-CM

## 2019-07-10 DIAGNOSIS — F41.9 ANXIETY: ICD-10-CM

## 2019-07-10 DIAGNOSIS — M54.5 CHRONIC LOW BACK PAIN, UNSPECIFIED BACK PAIN LATERALITY, WITH SCIATICA PRESENCE UNSPECIFIED: ICD-10-CM

## 2019-07-10 RX ORDER — LORAZEPAM 2 MG/1
2 TABLET ORAL EVERY 6 HOURS PRN
Qty: 120 TABLET | Refills: 0 | Status: SHIPPED | OUTPATIENT
Start: 2019-07-10 | End: 2019-07-11 | Stop reason: SDUPTHER

## 2019-07-10 NOTE — TELEPHONE ENCOUNTER
Store #[id number]    Pt called back and her prescription was sent to the wrong pharmacy, pt ask if you can resend to   [facility], [address] [place] [address]

## 2019-07-11 RX ORDER — TRAMADOL HYDROCHLORIDE 50 MG/1
TABLET ORAL
Qty: 100 TABLET | Refills: 0 | Status: SHIPPED | OUTPATIENT
Start: 2019-07-11 | End: 2019-08-15 | Stop reason: SDUPTHER

## 2019-07-11 RX ORDER — LORAZEPAM 2 MG/1
2 TABLET ORAL EVERY 6 HOURS PRN
Qty: 120 TABLET | Refills: 0 | Status: SHIPPED | OUTPATIENT
Start: 2019-07-11 | End: 2019-08-15 | Stop reason: SDUPTHER

## 2019-08-15 DIAGNOSIS — F41.9 ANXIETY: ICD-10-CM

## 2019-08-15 DIAGNOSIS — G89.29 CHRONIC LOW BACK PAIN, UNSPECIFIED BACK PAIN LATERALITY, WITH SCIATICA PRESENCE UNSPECIFIED: ICD-10-CM

## 2019-08-15 DIAGNOSIS — M54.5 CHRONIC LOW BACK PAIN, UNSPECIFIED BACK PAIN LATERALITY, WITH SCIATICA PRESENCE UNSPECIFIED: ICD-10-CM

## 2019-08-15 RX ORDER — LORAZEPAM 2 MG/1
2 TABLET ORAL EVERY 6 HOURS PRN
Qty: 120 TABLET | Refills: 0 | Status: SHIPPED | OUTPATIENT
Start: 2019-08-15 | End: 2019-09-19 | Stop reason: SDUPTHER

## 2019-08-15 RX ORDER — TRAMADOL HYDROCHLORIDE 50 MG/1
TABLET ORAL
Qty: 100 TABLET | Refills: 0 | Status: SHIPPED | OUTPATIENT
Start: 2019-08-15 | End: 2019-09-19 | Stop reason: SDUPTHER

## 2019-09-19 ENCOUNTER — TELEPHONE (OUTPATIENT)
Dept: FAMILY MEDICINE CLINIC | Facility: CLINIC | Age: 44
End: 2019-09-19

## 2019-09-19 DIAGNOSIS — F41.9 ANXIETY: ICD-10-CM

## 2019-09-19 DIAGNOSIS — G89.29 CHRONIC LOW BACK PAIN, UNSPECIFIED BACK PAIN LATERALITY, WITH SCIATICA PRESENCE UNSPECIFIED: ICD-10-CM

## 2019-09-19 DIAGNOSIS — M54.5 CHRONIC LOW BACK PAIN, UNSPECIFIED BACK PAIN LATERALITY, WITH SCIATICA PRESENCE UNSPECIFIED: ICD-10-CM

## 2019-09-19 RX ORDER — LORAZEPAM 2 MG/1
2 TABLET ORAL EVERY 6 HOURS PRN
Qty: 120 TABLET | Refills: 0 | Status: SHIPPED | OUTPATIENT
Start: 2019-09-19 | End: 2019-10-24 | Stop reason: SDUPTHER

## 2019-09-19 RX ORDER — TRAMADOL HYDROCHLORIDE 50 MG/1
TABLET ORAL
Qty: 100 TABLET | Refills: 0 | Status: SHIPPED | OUTPATIENT
Start: 2019-09-19 | End: 2019-10-24 | Stop reason: SDUPTHER

## 2019-10-03 DIAGNOSIS — F41.9 ANXIETY: ICD-10-CM

## 2019-10-03 RX ORDER — SERTRALINE HYDROCHLORIDE 100 MG/1
200 TABLET, FILM COATED ORAL DAILY
Qty: 60 TABLET | Refills: 3 | Status: SHIPPED | OUTPATIENT
Start: 2019-10-03 | End: 2019-10-14 | Stop reason: SDUPTHER

## 2019-10-14 ENCOUNTER — TELEPHONE (OUTPATIENT)
Dept: FAMILY MEDICINE CLINIC | Facility: CLINIC | Age: 44
End: 2019-10-14

## 2019-10-14 DIAGNOSIS — F41.9 ANXIETY: ICD-10-CM

## 2019-10-14 RX ORDER — SERTRALINE HYDROCHLORIDE 100 MG/1
200 TABLET, FILM COATED ORAL DAILY
Qty: 60 TABLET | Refills: 5 | Status: SHIPPED | OUTPATIENT
Start: 2019-10-14 | End: 2020-02-26 | Stop reason: SDUPTHER

## 2019-10-21 ENCOUNTER — OFFICE VISIT (OUTPATIENT)
Dept: FAMILY MEDICINE CLINIC | Facility: CLINIC | Age: 44
End: 2019-10-21
Payer: COMMERCIAL

## 2019-10-21 VITALS
WEIGHT: 73 LBS | HEIGHT: 56 IN | HEART RATE: 93 BPM | BODY MASS INDEX: 16.42 KG/M2 | OXYGEN SATURATION: 98 % | TEMPERATURE: 97.5 F | SYSTOLIC BLOOD PRESSURE: 112 MMHG | DIASTOLIC BLOOD PRESSURE: 60 MMHG | RESPIRATION RATE: 16 BRPM

## 2019-10-21 DIAGNOSIS — F32.A DEPRESSION, UNSPECIFIED DEPRESSION TYPE: ICD-10-CM

## 2019-10-21 DIAGNOSIS — F41.9 ANXIETY: ICD-10-CM

## 2019-10-21 DIAGNOSIS — G89.29 CHRONIC LOW BACK PAIN, UNSPECIFIED BACK PAIN LATERALITY, UNSPECIFIED WHETHER SCIATICA PRESENT: ICD-10-CM

## 2019-10-21 DIAGNOSIS — M54.50 CHRONIC LOW BACK PAIN, UNSPECIFIED BACK PAIN LATERALITY, UNSPECIFIED WHETHER SCIATICA PRESENT: ICD-10-CM

## 2019-10-21 DIAGNOSIS — Z72.0 TOBACCO USE: ICD-10-CM

## 2019-10-21 DIAGNOSIS — G35 MULTIPLE SCLEROSIS (HCC): ICD-10-CM

## 2019-10-21 DIAGNOSIS — G47.00 INSOMNIA, UNSPECIFIED TYPE: ICD-10-CM

## 2019-10-21 DIAGNOSIS — R32 URINARY INCONTINENCE, UNSPECIFIED TYPE: Primary | ICD-10-CM

## 2019-10-21 PROBLEM — R10.32 LEFT LOWER QUADRANT PAIN: Status: RESOLVED | Noted: 2019-05-16 | Resolved: 2019-10-21

## 2019-10-21 PROCEDURE — 99214 OFFICE O/P EST MOD 30 MIN: CPT | Performed by: FAMILY MEDICINE

## 2019-10-21 PROCEDURE — 3008F BODY MASS INDEX DOCD: CPT | Performed by: FAMILY MEDICINE

## 2019-10-21 RX ORDER — TRAZODONE HYDROCHLORIDE 100 MG/1
100 TABLET ORAL
Qty: 30 TABLET | Refills: 5 | Status: SHIPPED | OUTPATIENT
Start: 2019-10-21 | End: 2020-05-04 | Stop reason: SDUPTHER

## 2019-10-21 RX ORDER — OXYBUTYNIN CHLORIDE 5 MG/1
5 TABLET, EXTENDED RELEASE ORAL DAILY
Qty: 30 TABLET | Refills: 5 | Status: SHIPPED | OUTPATIENT
Start: 2019-10-21 | End: 2020-12-08 | Stop reason: SDUPTHER

## 2019-10-21 NOTE — PROGRESS NOTES
Assessment/Plan:    Return visit in 6 months   Diagnoses and all orders for this visit:    Urinary incontinence, unspecified type  -     oxybutynin (DITROPAN-XL) 5 mg 24 hr tablet; Take 1 tablet (5 mg total) by mouth daily    Anxiety    Multiple sclerosis (HCC)    Chronic low back pain, unspecified back pain laterality, unspecified whether sciatica present    Depression, unspecified depression type    Insomnia, unspecified type  -     traZODone (DESYREL) 100 mg tablet; Take 1 tablet (100 mg total) by mouth daily at bedtime    Tobacco use        Multiple sclerosis (HCC)  Advised to see Neurology    Anxiety  Continue Ativan 2 mg as needed    Depression  Continue Zoloft 20 mg daily    Tobacco use  Smoking cessation recommended        Subjective:      Patient ID: Caryn Ross is a 39 y o  female  Patient comes in for checkup  She is very upset over her father passing away 2 months ago  She is not sleeping well  She also complains of a 2 year history of urinary incontinence  The following portions of the patient's history were reviewed and updated as appropriate:   She has no past medical history on file  ,  does not have any pertinent problems on file  ,   has a past surgical history that includes Eye surgery  ,  family history includes Cancer in her father; Coronary artery disease in her mother; Heart attack in her mother; Mental illness in her father; Substance Abuse in her mother  ,   reports that she has been smoking  She has never used smokeless tobacco  She reports that she does not drink alcohol  Her drug history is not on file  ,  is allergic to sulfa antibiotics     Current Outpatient Medications   Medication Sig Dispense Refill    LORazepam (ATIVAN) 2 mg tablet Take 1 tablet (2 mg total) by mouth every 6 (six) hours as needed for anxiety 120 tablet 0    oxybutynin (DITROPAN-XL) 5 mg 24 hr tablet Take 1 tablet (5 mg total) by mouth daily 30 tablet 5    sertraline (ZOLOFT) 100 mg tablet Take 2 tablets (200 mg total) by mouth daily 60 tablet 5    traMADol (ULTRAM) 50 mg tablet 1-2 q 4 hours p r n  pain 100 tablet 0    traZODone (DESYREL) 100 mg tablet Take 1 tablet (100 mg total) by mouth daily at bedtime 30 tablet 5     No current facility-administered medications for this visit  Review of Systems   Constitutional: Negative  Respiratory: Negative  Genitourinary: Positive for frequency  Negative for dysuria  Psychiatric/Behavioral: Positive for dysphoric mood  The patient is nervous/anxious  Objective:  Vitals:    10/21/19 1710   BP: 112/60   Pulse: 93   Resp: 16   Temp: 97 5 °F (36 4 °C)   SpO2: 98%   Weight: 33 1 kg (73 lb)   Height: 4' 7 5" (1 41 m)     Body mass index is 16 66 kg/m²  Physical Exam   Constitutional: She is oriented to person, place, and time  She appears well-developed  HENT:   Head: Normocephalic and atraumatic  Right Ear: Tympanic membrane and external ear normal    Left Ear: Tympanic membrane and external ear normal    Mouth/Throat: Oropharynx is clear and moist    Eyes: Pupils are equal, round, and reactive to light  EOM are normal    Neck: Neck supple  Cardiovascular: Normal rate, regular rhythm and normal heart sounds  Pulmonary/Chest: Effort normal and breath sounds normal    Abdominal: Soft  Musculoskeletal: Normal range of motion  Neurological: She is alert and oriented to person, place, and time  Skin: Skin is warm and dry  Psychiatric: She has a normal mood and affect

## 2019-10-24 ENCOUNTER — TELEPHONE (OUTPATIENT)
Dept: FAMILY MEDICINE CLINIC | Facility: CLINIC | Age: 44
End: 2019-10-24

## 2019-10-24 DIAGNOSIS — M54.50 CHRONIC LOW BACK PAIN: ICD-10-CM

## 2019-10-24 DIAGNOSIS — F41.9 ANXIETY: ICD-10-CM

## 2019-10-24 DIAGNOSIS — G89.29 CHRONIC LOW BACK PAIN: ICD-10-CM

## 2019-10-24 RX ORDER — TRAMADOL HYDROCHLORIDE 50 MG/1
TABLET ORAL
Qty: 100 TABLET | Refills: 0 | Status: SHIPPED | OUTPATIENT
Start: 2019-10-24 | End: 2019-12-06 | Stop reason: SDUPTHER

## 2019-10-24 RX ORDER — LORAZEPAM 2 MG/1
2 TABLET ORAL EVERY 6 HOURS PRN
Qty: 120 TABLET | Refills: 0 | Status: SHIPPED | OUTPATIENT
Start: 2019-10-24 | End: 2019-12-06 | Stop reason: SDUPTHER

## 2019-10-24 NOTE — TELEPHONE ENCOUNTER
rf lorazepam   and   tramadol     PDMP   09/20/2019  1  09/19/2019  LORAZEPAM 2 MG TABLET  120 0  30      09/19/2019  1  09/19/2019  TRAMADOL HCL 50 MG TABLET  100 0  13

## 2019-11-21 ENCOUNTER — TELEPHONE (OUTPATIENT)
Dept: FAMILY MEDICINE CLINIC | Facility: CLINIC | Age: 44
End: 2019-11-21

## 2019-11-21 NOTE — TELEPHONE ENCOUNTER
T/c from pt who seems very distressed over weight loss  She is down to 68 4 lbs  She is having issues with acid reflux, she states she is "not in good shape", she has weakness, no appetite, can not eat, no energy, so weak, depression  Her sleep med is finally starting to work  She has a lab order from 5/19  She wants to know if she should have that done and if you want to add any other tests to it d/t her current symptoms     Please call pt back at [phone]

## 2019-11-22 DIAGNOSIS — R63.4 WEIGHT LOSS: Primary | ICD-10-CM

## 2019-11-22 NOTE — TELEPHONE ENCOUNTER
TSH added to the previous blood work    Patient should make appointment soon to evaluate weight loss

## 2019-12-06 DIAGNOSIS — G89.29 CHRONIC LOW BACK PAIN: ICD-10-CM

## 2019-12-06 DIAGNOSIS — M54.50 CHRONIC LOW BACK PAIN: ICD-10-CM

## 2019-12-06 DIAGNOSIS — F41.9 ANXIETY: ICD-10-CM

## 2019-12-06 RX ORDER — TRAMADOL HYDROCHLORIDE 50 MG/1
TABLET ORAL
Qty: 100 TABLET | Refills: 0 | Status: SHIPPED | OUTPATIENT
Start: 2019-12-06 | End: 2020-01-28 | Stop reason: SDUPTHER

## 2019-12-06 RX ORDER — LORAZEPAM 2 MG/1
2 TABLET ORAL EVERY 6 HOURS PRN
Qty: 120 TABLET | Refills: 0 | Status: SHIPPED | OUTPATIENT
Start: 2019-12-06 | End: 2020-01-16 | Stop reason: SDUPTHER

## 2020-01-16 ENCOUNTER — TELEPHONE (OUTPATIENT)
Dept: FAMILY MEDICINE CLINIC | Facility: CLINIC | Age: 45
End: 2020-01-16

## 2020-01-16 DIAGNOSIS — F41.9 ANXIETY: ICD-10-CM

## 2020-01-16 RX ORDER — LORAZEPAM 2 MG/1
2 TABLET ORAL EVERY 6 HOURS PRN
Qty: 120 TABLET | Refills: 0 | Status: SHIPPED | OUTPATIENT
Start: 2020-01-16 | End: 2020-02-27 | Stop reason: SDUPTHER

## 2020-01-17 NOTE — TELEPHONE ENCOUNTER
Prior Auth received for Lorazepam     Its for quantity limit over ride  No more than 90 in 30 days per insurance  Decrease or proceed with prior auth?

## 2020-01-28 DIAGNOSIS — G89.29 CHRONIC LOW BACK PAIN: ICD-10-CM

## 2020-01-28 DIAGNOSIS — M54.50 CHRONIC LOW BACK PAIN: ICD-10-CM

## 2020-01-28 RX ORDER — TRAMADOL HYDROCHLORIDE 50 MG/1
TABLET ORAL
Qty: 100 TABLET | Refills: 0 | Status: SHIPPED | OUTPATIENT
Start: 2020-01-28 | End: 2020-02-24 | Stop reason: SDUPTHER

## 2020-02-22 ENCOUNTER — NURSE TRIAGE (OUTPATIENT)
Dept: OTHER | Facility: OTHER | Age: 45
End: 2020-02-22

## 2020-02-22 DIAGNOSIS — G89.29 CHRONIC LOW BACK PAIN: ICD-10-CM

## 2020-02-22 DIAGNOSIS — M54.50 CHRONIC LOW BACK PAIN: ICD-10-CM

## 2020-02-22 NOTE — TELEPHONE ENCOUNTER
Regarding: withdrawl symptoms   ----- Message from Young Mode sent at 2/22/2020  1:25 PM EST -----  " I need my  my tramadol medicaiton "

## 2020-02-22 NOTE — TELEPHONE ENCOUNTER
Pt called because she needs her Tramadol refilled but she needs pre/prior authorization as per the pharmacy  She is requesting that this is worked on next business day

## 2020-02-24 RX ORDER — TRAMADOL HYDROCHLORIDE 50 MG/1
TABLET ORAL
Qty: 100 TABLET | Refills: 0 | Status: SHIPPED | OUTPATIENT
Start: 2020-02-24 | End: 2020-03-23 | Stop reason: SDUPTHER

## 2020-02-25 ENCOUNTER — OFFICE VISIT (OUTPATIENT)
Dept: FAMILY MEDICINE CLINIC | Facility: CLINIC | Age: 45
End: 2020-02-25
Payer: COMMERCIAL

## 2020-02-25 VITALS
DIASTOLIC BLOOD PRESSURE: 72 MMHG | SYSTOLIC BLOOD PRESSURE: 110 MMHG | WEIGHT: 74 LBS | BODY MASS INDEX: 16.65 KG/M2 | HEART RATE: 75 BPM | OXYGEN SATURATION: 98 % | TEMPERATURE: 98.1 F | HEIGHT: 56 IN

## 2020-02-25 DIAGNOSIS — Z00.00 ANNUAL PHYSICAL EXAM: Primary | ICD-10-CM

## 2020-02-25 DIAGNOSIS — F41.9 ANXIETY: ICD-10-CM

## 2020-02-25 DIAGNOSIS — M54.50 CHRONIC LOW BACK PAIN, UNSPECIFIED BACK PAIN LATERALITY, UNSPECIFIED WHETHER SCIATICA PRESENT: ICD-10-CM

## 2020-02-25 DIAGNOSIS — L81.9 CHANGE IN MULTIPLE PIGMENTED SKIN LESIONS: ICD-10-CM

## 2020-02-25 DIAGNOSIS — F33.1 MODERATE EPISODE OF RECURRENT MAJOR DEPRESSIVE DISORDER (HCC): ICD-10-CM

## 2020-02-25 DIAGNOSIS — M62.830 SPASM OF THORACIC BACK MUSCLE: ICD-10-CM

## 2020-02-25 DIAGNOSIS — G89.29 CHRONIC LOW BACK PAIN, UNSPECIFIED BACK PAIN LATERALITY, UNSPECIFIED WHETHER SCIATICA PRESENT: ICD-10-CM

## 2020-02-25 PROCEDURE — 3008F BODY MASS INDEX DOCD: CPT | Performed by: PHYSICIAN ASSISTANT

## 2020-02-25 PROCEDURE — 99396 PREV VISIT EST AGE 40-64: CPT | Performed by: PHYSICIAN ASSISTANT

## 2020-02-25 NOTE — PATIENT INSTRUCTIONS
Wellness Visit for Adults   AMBULATORY CARE:   A wellness visit  is when you see your healthcare provider to get screened for health problems  You can also get advice on how to stay healthy  Write down your questions so you remember to ask them  Ask your healthcare provider how often you should have a wellness visit  What happens at a wellness visit:  Your healthcare provider will ask about your health, and your family history of health problems  This includes high blood pressure, heart disease, and cancer  He or she will ask if you have symptoms that concern you, if you smoke, and about your mood  You may also be asked about your intake of medicines, supplements, food, and alcohol  Any of the following may be done:  · Your weight  will be checked  Your height may also be checked so your body mass index (BMI) can be calculated  Your BMI shows if you are at a healthy weight  · Your blood pressure  and heart rate will be checked  Your temperature may also be checked  · Blood and urine tests  may be done  Blood tests may be done to check your cholesterol levels  Abnormal cholesterol levels increase your risk for heart disease and stroke  You may also need a blood or urine test to check for diabetes if you are at increased risk  Urine tests may be done to look for signs of an infection or kidney disease  · A physical exam  includes checking your heartbeat and lungs with a stethoscope  Your healthcare provider may also check your skin to look for sun damage  · Screening tests  may be recommended  A screening test is done to check for diseases that may not cause symptoms  The screening tests you may need depend on your age, gender, family history, and lifestyle habits  For example, colorectal screening may be recommended if you are 48years old or older  Screening tests you need if you are a woman:   · A Pap smear  is used to screen for cervical cancer   Pap smears are usually done every 3 to 5 years depending on your age  You may need them more often if you have had abnormal Pap smear test results in the past  Ask your healthcare provider how often you should have a Pap smear  · A mammogram  is an x-ray of your breasts to screen for breast cancer  Experts recommend mammograms every 2 years starting at age 48 years  You may need a mammogram at age 52 years or younger if you have an increased risk for breast cancer  Talk to your healthcare provider about when you should start having mammograms and how often you need them  Vaccines you may need:   · Get an influenza vaccine  every year  The influenza vaccine protects you from the flu  Several types of viruses cause the flu  The viruses change over time, so new vaccines are made each year  · Get a tetanus-diphtheria (Td) booster vaccine  every 10 years  This vaccine protects you against tetanus and diphtheria  Tetanus is a severe infection that may cause painful muscle spasms and lockjaw  Diphtheria is a severe bacterial infection that causes a thick covering in the back of your mouth and throat  · Get a human papillomavirus (HPV) vaccine  if you are female and aged 23 to 32 or male 23 to 24 and never received it  This vaccine protects you from HPV infection  HPV is the most common infection spread by sexual contact  HPV may also cause vaginal, penile, and anal cancers  · Get a pneumococcal vaccine  if you are aged 72 years or older  The pneumococcal vaccine is an injection given to protect you from pneumococcal disease  Pneumococcal disease is an infection caused by pneumococcal bacteria  The infection may cause pneumonia, meningitis, or an ear infection  · Get a shingles vaccine  if you are aged 61 or older, even if you have had shingles before  The shingles vaccine is an injection to protect you from the varicella-zoster virus  This is the same virus that causes chickenpox   Shingles is a painful rash that develops in people who had chickenpox or have been exposed to the virus  How to eat healthy:  My Plate is a model for planning healthy meals  It shows the types and amounts of foods that should go on your plate  Fruits and vegetables make up about half of your plate, and grains and protein make up the other half  A serving of dairy is included on the side of your plate  The amount of calories and serving sizes you need depends on your age, gender, weight, and height  Examples of healthy foods are listed below:  · Eat a variety of vegetables  such as dark green, red, and orange vegetables  You can also include canned vegetables low in sodium (salt) and frozen vegetables without added butter or sauces  · Eat a variety of fresh fruits , canned fruit in 100% juice, frozen fruit, and dried fruit  · Include whole grains  At least half of the grains you eat should be whole grains  Examples include whole-wheat bread, wheat pasta, brown rice, and whole-grain cereals such as oatmeal     · Eat a variety of protein foods such as seafood (fish and shellfish), lean meat, and poultry without skin (turkey and chicken)  Examples of lean meats include pork leg, shoulder, or tenderloin, and beef round, sirloin, tenderloin, and extra lean ground beef  Other protein foods include eggs and egg substitutes, beans, peas, soy products, nuts, and seeds  · Choose low-fat dairy products such as skim or 1% milk or low-fat yogurt, cheese, and cottage cheese  · Limit unhealthy fats  such as butter, hard margarine, and shortening  Exercise:  Exercise at least 30 minutes per day on most days of the week  Some examples of exercise include walking, biking, dancing, and swimming  You can also fit in more physical activity by taking the stairs instead of the elevator or parking farther away from stores  Include muscle strengthening activities 2 days each week  Regular exercise provides many health benefits   It helps you manage your weight, and decreases your risk for type 2 diabetes, heart disease, stroke, and high blood pressure  Exercise can also help improve your mood  Ask your healthcare provider about the best exercise plan for you  General health and safety guidelines:   · Do not smoke  Nicotine and other chemicals in cigarettes and cigars can cause lung damage  Ask your healthcare provider for information if you currently smoke and need help to quit  E-cigarettes or smokeless tobacco still contain nicotine  Talk to your healthcare provider before you use these products  · Limit alcohol  A drink of alcohol is 12 ounces of beer, 5 ounces of wine, or 1½ ounces of liquor  · Lose weight, if needed  Being overweight increases your risk of certain health conditions  These include heart disease, high blood pressure, type 2 diabetes, and certain types of cancer  · Protect your skin  Do not sunbathe or use tanning beds  Use sunscreen with a SPF 15 or higher  Apply sunscreen at least 15 minutes before you go outside  Reapply sunscreen every 2 hours  Wear protective clothing, hats, and sunglasses when you are outside  · Drive safely  Always wear your seatbelt  Make sure everyone in your car wears a seatbelt  A seatbelt can save your life if you are in an accident  Do not use your cell phone when you are driving  This could distract you and cause an accident  Pull over if you need to make a call or send a text message  · Practice safe sex  Use latex condoms if are sexually active and have more than one partner  Your healthcare provider may recommend screening tests for sexually transmitted infections (STIs)  · Wear helmets, lifejackets, and protective gear  Always wear a helmet when you ride a bike or motorcycle, go skiing, or play sports that could cause a head injury  Wear protective equipment when you play sports  Wear a lifejacket when you are on a boat or doing water sports    © 2017 [facility] Information is for End User's use only and may not be sold, redistributed or otherwise used for commercial purposes  All illustrations and images included in CareNotes® are the copyrighted property of [employer] , [employer]  or [employer]  The above information is an  only  It is not intended as medical advice for individual conditions or treatments  Talk to your doctor, nurse or pharmacist before following any medical regimen to see if it is safe and effective for you  Cigarette Smoking and Your Health   AMBULATORY CARE:   Risks to your health if you smoke:  Nicotine and other chemicals found in tobacco damage every cell in your body  Even if you are a light smoker, you have an increased risk for cancer, heart disease, and lung disease  If you are pregnant or have diabetes, smoking increases your risk for complications  Benefits to your health if you stop smoking:   · You decrease respiratory symptoms such as coughing, wheezing, and shortness of breath  · You reduce your risk for cancers of the lung, mouth, throat, kidney, bladder, pancreas, stomach, and cervix  If you already have cancer, you increase the benefits of chemotherapy  You also reduce your risk for cancer returning or a second cancer from developing  · You reduce your risk for heart disease, blood clots, heart attack, and stroke  · You reduce your risk for lung infections, and diseases such as pneumonia, asthma, chronic bronchitis, and emphysema  · Your circulation improves  More oxygen can be delivered to your body  If you have diabetes, you lower your risk for complications, such as kidney, artery, and eye diseases  You also lower your risk for nerve damage  Nerve damage can lead to amputations, poor vision, and blindness  · You improve your body's ability to heal and to fight infections  Benefits to the health of others if you stop smoking:  Tobacco is harmful to nonsmokers who breathe in your secondhand smoke   The following are ways the health of others around you may improve when you stop smoking:  · You lower the risks for lung cancer and heart disease in nonsmoking adults  · If you are pregnant, you lower the risk for miscarriage, early delivery, low birth weight, and stillbirth  You also lower your baby's risk for SIDS, obesity, developmental delay, and neurobehavioral problems, such as ADHD  · If you have children, you lower their risk for ear infections, colds, pneumonia, bronchitis, and asthma  For more information and support to stop smoking:   · [employer]  Phone: 8- 218 - 656-7484  Web Address: www [employer]  Follow up with your healthcare provider as directed:  Write down your questions so you remember to ask them during your visits  © 2017 [address] Information is for End User's use only and may not be sold, redistributed or otherwise used for commercial purposes  All illustrations and images included in CareNotes® are the copyrighted property of [employer]  or Napoleon Duke  The above information is an  only  It is not intended as medical advice for individual conditions or treatments  Talk to your doctor, nurse or pharmacist before following any medical regimen to see if it is safe and effective for you

## 2020-02-25 NOTE — PROGRESS NOTES
[place] [address] [address]    NAME: Isadora Box  AGE: 38 y o  SEX: female  : 1975     DATE: 2020     Assessment and Plan:     Problem List Items Addressed This Visit        Musculoskeletal and Integument    Change in multiple pigmented skin lesions    Relevant Orders    Ambulatory referral to Dermatology       Other    Depression    Back pain, chronic    Anxiety    Annual physical exam - Primary    Spasm of thoracic back muscle          Immunizations and preventive care screenings were discussed with patient today  Appropriate education was printed on patient's after visit summary  Counseling:  Dental Health: discussed importance of regular tooth brushing, flossing, and dental visits  Injury prevention: discussed safety/seat belts, safety helmets, smoke detectors, carbon dioxide detectors, and smoking near bedding or upholstery  · Exercise: the importance of regular exercise/physical activity was discussed  Recommend exercise 3-5 times per week for at least 30 minutes  Depression Screening and Follow-up Plan: Patient's depression screening was positive with a PHQ-2 score of 6  Their PHQ-9 score was 18  Patient assessed for underlying major depression  Brief counseling provided and recommend additional follow-up/re-evaluation next office visit  Tobacco Cessation Counseling: Tobacco cessation counseling was provided  The patient is sincerely urged to quit consumption of tobacco  She is not ready to quit tobacco        Return in about 3 months (around 2020) for Recheck  Patient is to contact us if she is worsening with her depression  I offered her a referral to counseling which she declined at this time       Chief Complaint:     Chief Complaint   Patient presents with    Physical Exam     annual, medication check, no current complaint of pain    Medication Refill     lorazepam, tramadol(may need prior), sertraline to local pharmacy      History of Present Illness:     Adult Annual Physical   Patient here for a comprehensive physical exam  The patient reports changing moles on her back; new pain by her shoulder blades, situational worsening of her depression from loss of father and difficulties with his estate  Diet and Physical Activity  · Diet/Nutrition: poor diet  · Exercise: no formal exercise  Depression Screening  PHQ-9 Depression Screening    PHQ-9:    Frequency of the following problems over the past two weeks:       Little interest or pleasure in doing things:  3 - nearly every day  Feeling down, depressed, or hopeless:  3 - nearly every day  Trouble falling or staying asleep, or sleeping too much:  0 - not at all  Feeling tired or having little energy:  3 - nearly every day  Poor appetite or overeating:  3 - nearly every day  Feeling bad about yourself - or that you are a failure or have let yourself or your family down:  3 - nearly every day  Trouble concentrating on things, such as reading the newspaper or watching television:  3 - nearly every day  Moving or speaking so slowly that other people could have noticed  Or the opposite - being so fidgety or restless that you have been moving around a lot more than usual:  0 - not at all  Thoughts that you would be better off dead, or of hurting yourself in some way:  0 - not at all  PHQ-2 Score:  6  PHQ-9 Score:  18       General Health  · Sleep: sleeps well  · Hearing: normal - bilateral   · Vision: most recent eye exam >1 year ago  · Dental: no dental visits for >1 year  /GYN Health  · Patient is: postmenopausal  · Last menstrual period: 2018  · Contraceptive method: NA      Review of Systems:     Review of Systems   Constitutional: Positive for activity change and fatigue  Negative for appetite change     HENT: Negative for congestion, ear pain, postnasal drip, rhinorrhea, sneezing, sore throat and trouble swallowing  Eyes: Negative for pain  Respiratory: Negative for cough, chest tightness and shortness of breath  Cardiovascular: Negative for chest pain, palpitations and leg swelling  Gastrointestinal: Negative for abdominal pain, constipation, diarrhea and nausea  Endocrine: Negative for cold intolerance and heat intolerance  Genitourinary: Negative for difficulty urinating and flank pain  Musculoskeletal: Positive for arthralgias, back pain and myalgias  Skin: Positive for color change  Neurological: Negative for dizziness, light-headedness and headaches  Psychiatric/Behavioral: Positive for dysphoric mood  Negative for self-injury, sleep disturbance and suicidal ideas  The patient is not nervous/anxious  Past Medical History:     History reviewed  No pertinent past medical history     Past Surgical History:     Past Surgical History:   Procedure Laterality Date    EYE SURGERY      For Strabismus      Social History:        Social History     Socioeconomic History    Marital status: /Civil Union     Spouse name: None    Number of children: None    Years of education: None    Highest education level: None   Occupational History    None   Social Needs    Financial resource strain: None    Food insecurity:     Worry: None     Inability: None    Transportation needs:     Medical: None     Non-medical: None   Tobacco Use    Smoking status: Current Every Day Smoker    Smokeless tobacco: Never Used   Substance and Sexual Activity    Alcohol use: No    Drug use: None    Sexual activity: None   Lifestyle    Physical activity:     Days per week: None     Minutes per session: None    Stress: None   Relationships    Social connections:     Talks on phone: None     Gets together: None     Attends Anabaptism service: None     Active member of club or organization: None     Attends meetings of clubs or organizations: None     Relationship status: None    Intimate partner violence:     Fear of current or ex partner: None     Emotionally abused: None     Physically abused: None     Forced sexual activity: None   Other Topics Concern    None   Social History Narrative    Per [employer]:         Always uses seat belt       Family History:     Family History   Problem Relation Age of Onset    Coronary artery disease Mother     Heart attack Mother     Substance Abuse Mother     Cancer Father         Bladder     Mental illness Father       Current Medications:     Current Outpatient Medications   Medication Sig Dispense Refill    LORazepam (ATIVAN) 2 mg tablet Take 1 tablet (2 mg total) by mouth every 6 (six) hours as needed for anxiety 120 tablet 0    oxybutynin (DITROPAN-XL) 5 mg 24 hr tablet Take 1 tablet (5 mg total) by mouth daily 30 tablet 5    sertraline (ZOLOFT) 100 mg tablet Take 2 tablets (200 mg total) by mouth daily 60 tablet 5    traMADol (ULTRAM) 50 mg tablet 1-2 q 4 hours p r n  pain 100 tablet 0    traZODone (DESYREL) 100 mg tablet Take 1 tablet (100 mg total) by mouth daily at bedtime 30 tablet 5     No current facility-administered medications for this visit  Allergies: Allergies   Allergen Reactions    Sulfa Antibiotics       Physical Exam:     /72   Pulse 75   Temp 98 1 °F (36 7 °C)   Ht 4' 7 5" (1 41 m)   Wt 33 6 kg (74 lb)   SpO2 98%   BMI 16 89 kg/m²     Physical Exam   Constitutional: She is oriented to person, place, and time  She appears well-developed and well-nourished  HENT:   Head: Normocephalic  Right Ear: Tympanic membrane, external ear and ear canal normal    Left Ear: Tympanic membrane, external ear and ear canal normal    Nose: Nose normal    Mouth/Throat: Uvula is midline, oropharynx is clear and moist and mucous membranes are normal    Eyes: Pupils are equal, round, and reactive to light  Conjunctivae and EOM are normal    Neck: Normal range of motion  Carotid bruit is not present  No thyromegaly present  Cardiovascular: Normal rate, regular rhythm, normal heart sounds and intact distal pulses  Pulmonary/Chest: Effort normal and breath sounds normal  She has no wheezes  Abdominal: Soft  Bowel sounds are normal  She exhibits no mass  There is no hepatosplenomegaly  There is no tenderness  There is no CVA tenderness  Musculoskeletal: Normal range of motion  She exhibits tenderness  She exhibits no edema  Arms:  Lymphadenopathy:     She has no cervical adenopathy  Neurological: She is alert and oriented to person, place, and time  She has normal reflexes  She exhibits normal muscle tone  Coordination normal    Skin: Skin is warm, dry and intact  Lesion noted  Skin lesion on right side of back has verrucous appearance with a light brown color and central darkening  Skin lesion on left is smooth with medium brown color and dark pigmentation centrally   Psychiatric: Her speech is normal and behavior is normal  Judgment and thought content normal  Cognition and memory are normal  She exhibits a depressed mood  Patient is tearful  Nursing note and vitals reviewed        MAXINE Blakely [phone]

## 2020-02-26 ENCOUNTER — TELEPHONE (OUTPATIENT)
Dept: FAMILY MEDICINE CLINIC | Facility: CLINIC | Age: 45
End: 2020-02-26

## 2020-02-26 ENCOUNTER — TELEPHONE (OUTPATIENT)
Dept: OTHER | Facility: OTHER | Age: 45
End: 2020-02-26

## 2020-02-26 DIAGNOSIS — F41.9 ANXIETY: ICD-10-CM

## 2020-02-26 RX ORDER — SERTRALINE HYDROCHLORIDE 100 MG/1
200 TABLET, FILM COATED ORAL DAILY
Qty: 60 TABLET | Refills: 5 | Status: SHIPPED | OUTPATIENT
Start: 2020-02-26 | End: 2020-08-27 | Stop reason: SDUPTHER

## 2020-02-26 NOTE — TELEPHONE ENCOUNTER
Prior auth request received 2/25/20  Provider approved prior auth to be sent verbally  Patient informed I would prepare prior auth on 2/26/20 and that is done for Tramadol  Pain Contract and UDS obtained on 2/25/20  Prior auth on Dr Wander Guzman for signature and will be faxed

## 2020-02-27 RX ORDER — LORAZEPAM 2 MG/1
2 TABLET ORAL EVERY 6 HOURS PRN
Qty: 120 TABLET | Refills: 0 | Status: SHIPPED | OUTPATIENT
Start: 2020-02-27 | End: 2020-04-01 | Stop reason: SDUPTHER

## 2020-02-27 NOTE — TELEPHONE ENCOUNTER
01/28/2020  1  01/28/2020  TRAMADOL HCL 50 MG TABLET  40 0  5  [facility]  [id number]  [id number]  0  40 0 MME  [place]    01/17/2020  1  01/16/2020  LORAZEPAM 2 MG TABLET  120 0  30  [facility]  [id number]  [facility] ([id number]  0   [place]    12/06/2019  1  12/06/2019  TRAMADOL HCL 50 MG TABLET  100 0  13  [facility]  [id number]  [facility] ([id number]  0  38 46 MME  [place]    12/06/2019  1  12/06/2019  LORAZEPAM 2 MG TABLET  120 0  30  [facility]  [id number]  [facility] ([id number][facility]  0   [place]    10/24/2019  1  10/24/2019  TRAMADOL HCL 50 MG TABLET  100 0  13  [facility]  [id number]  [facility] ([id number][facility]  0  38 46 MME  [place]    10/24/2019  1  10/24/2019  LORAZEPAM 2 MG TABLET  120 0  30  [facility]  [id number]  [facility] ([id number][facility]  0   [place]    09/20/2019  1  09/19/2019  LORAZEPAM 2 MG TABLET  120 0  30  [facility]  [id number]  [facility] ([id number]  0   [place]    09/19/2019  1  09/19/2019  TRAMADOL HCL 50 MG TABLET  100 0  13  [facility]A  [id number]  [facility] ([id number][facility]  0  38 46 MME  [place]    08/15/2019  1  08/15/2019  TRAMADOL HCL 50 MG TABLET  100 0  13  LORETTA RUSH  [id number]  [facility] ([id number])  0  38 46 MME  [place]    08/15/2019  1  08/15/2019  LORAZEPAM 2 MG TABLET  120 0  30  LORETTA RUSH  [id number]  [facility] ([id number])  0   [place]          Please review refill

## 2020-03-02 NOTE — TELEPHONE ENCOUNTER
Pt rx of tramadol was for quantity of 100  Is that for 30 or 90 day? We have a huge denial on her Tramadol

## 2020-03-02 NOTE — TELEPHONE ENCOUNTER
That is the amount from the past prescriptions  It would be a 30 day supply maybe a few days longer

## 2020-03-02 NOTE — TELEPHONE ENCOUNTER
I am sorry but Hugo Beatty started all of this I would be more comfortable if he backed everything up with his knowledge of her situation

## 2020-03-17 NOTE — TELEPHONE ENCOUNTER
T/c to pt :  Mailbox is full  If pt calls back, she needs an appt to document the denial questions in an office note

## 2020-03-23 ENCOUNTER — TELEMEDICINE (OUTPATIENT)
Dept: FAMILY MEDICINE CLINIC | Facility: CLINIC | Age: 45
End: 2020-03-23
Payer: COMMERCIAL

## 2020-03-23 DIAGNOSIS — G89.29 CHRONIC LOW BACK PAIN: ICD-10-CM

## 2020-03-23 DIAGNOSIS — F41.9 ANXIETY: Primary | ICD-10-CM

## 2020-03-23 DIAGNOSIS — R32 URINARY INCONTINENCE, UNSPECIFIED TYPE: ICD-10-CM

## 2020-03-23 DIAGNOSIS — M54.50 CHRONIC LOW BACK PAIN, UNSPECIFIED BACK PAIN LATERALITY, UNSPECIFIED WHETHER SCIATICA PRESENT: ICD-10-CM

## 2020-03-23 DIAGNOSIS — G89.29 CHRONIC LOW BACK PAIN, UNSPECIFIED BACK PAIN LATERALITY, UNSPECIFIED WHETHER SCIATICA PRESENT: ICD-10-CM

## 2020-03-23 DIAGNOSIS — F33.1 MODERATE EPISODE OF RECURRENT MAJOR DEPRESSIVE DISORDER (HCC): ICD-10-CM

## 2020-03-23 DIAGNOSIS — M54.50 CHRONIC LOW BACK PAIN: ICD-10-CM

## 2020-03-23 PROCEDURE — G2012 BRIEF CHECK IN BY MD/QHP: HCPCS | Performed by: FAMILY MEDICINE

## 2020-03-23 RX ORDER — TRAMADOL HYDROCHLORIDE 50 MG/1
TABLET ORAL
Qty: 60 TABLET | Refills: 0 | Status: SHIPPED | OUTPATIENT
Start: 2020-03-23 | End: 2020-04-27 | Stop reason: SDUPTHER

## 2020-03-23 NOTE — PROGRESS NOTES
Virtual Brief Visit    Reason for visit is  Check up  Encounter provider Navneet Mata MD    Provider located at [facility] [address]  [address] [facility]  [address] [address] [facility]  [phone]      Recent Visits  No visits were found meeting these conditions  Showing recent visits within past 7 days and meeting all other requirements     Future Appointments  No visits were found meeting these conditions  Showing future appointments within next 150 days and meeting all other requirements        Patient agrees to participate in a virtual check in via telephone or video visit instead of presenting to the office to address urgent/immediate medical needs  Patient is aware this is a billable service  After connecting through telephone, the patient was identified by name and date of birth  Isadora Box was informed that this was a telemedicine visit and that the visit is being conducted through telephone which may not be secure and therefore might not be HIPAA-compliant  My office door was closed  No one else was in the room  She acknowledged consent and understanding of privacy and security of the virtual check-in visit  I informed the patient that I have reviewed her record in Epic and presented the opportunity for her to ask any questions regarding the visit today  The patient initiated communication and agreed to participate  Subjective  Isadora Box is a 38 y o  female she takes tramadol for chronic low back pain  She is complaining of mid back pain which is new  History reviewed  No pertinent past medical history      Past Surgical History:   Procedure Laterality Date    EYE SURGERY      For Strabismus       Current Outpatient Medications   Medication Sig Dispense Refill    LORazepam (ATIVAN) 2 mg tablet Take 1 tablet (2 mg total) by mouth every 6 (six) hours as needed for anxiety 120 tablet 0    oxybutynin (DITROPAN-XL) 5 mg 24 hr tablet Take 1 tablet (5 mg total) by mouth daily 30 tablet 5    sertraline (ZOLOFT) 100 mg tablet Take 2 tablets (200 mg total) by mouth daily 60 tablet 5    traMADol (ULTRAM) 50 mg tablet 1-2 q 4 hours p r n  pain 100 tablet 0    traZODone (DESYREL) 100 mg tablet Take 1 tablet (100 mg total) by mouth daily at bedtime 30 tablet 5     No current facility-administered medications for this visit  Allergies   Allergen Reactions    Sulfa Antibiotics        Assessment   Chronic low back pain  Continue tramadol prescription is refilled  Thoracic back pain  Exercises for upper back pain recommended  Anxiety continue Ativan  Urinary incontinence continue Ditropan  Insomnia continue trazodone      Disposition:    Return visit in 6 months    I spent 25 minutes with the patient during this virtual check-in visit  Level of service 99980

## 2020-04-01 DIAGNOSIS — F41.9 ANXIETY: ICD-10-CM

## 2020-04-01 RX ORDER — LORAZEPAM 2 MG/1
2 TABLET ORAL EVERY 6 HOURS PRN
Qty: 120 TABLET | Refills: 0 | Status: SHIPPED | OUTPATIENT
Start: 2020-04-01 | End: 2020-04-03 | Stop reason: SDUPTHER

## 2020-04-03 DIAGNOSIS — F41.9 ANXIETY: ICD-10-CM

## 2020-04-03 RX ORDER — LORAZEPAM 2 MG/1
2 TABLET ORAL EVERY 6 HOURS PRN
Qty: 120 TABLET | Refills: 0 | Status: SHIPPED | OUTPATIENT
Start: 2020-04-03 | End: 2020-05-04 | Stop reason: SDUPTHER

## 2020-04-27 DIAGNOSIS — G89.29 CHRONIC LOW BACK PAIN: ICD-10-CM

## 2020-04-27 DIAGNOSIS — M54.50 CHRONIC LOW BACK PAIN: ICD-10-CM

## 2020-04-27 RX ORDER — TRAMADOL HYDROCHLORIDE 50 MG/1
TABLET ORAL
Qty: 60 TABLET | Refills: 0 | Status: SHIPPED | OUTPATIENT
Start: 2020-04-27 | End: 2020-05-28 | Stop reason: SDUPTHER

## 2020-05-04 DIAGNOSIS — G47.00 INSOMNIA, UNSPECIFIED TYPE: ICD-10-CM

## 2020-05-04 DIAGNOSIS — F41.9 ANXIETY: ICD-10-CM

## 2020-05-04 RX ORDER — LORAZEPAM 2 MG/1
TABLET ORAL
Qty: 120 TABLET | OUTPATIENT
Start: 2020-05-04

## 2020-05-04 RX ORDER — LORAZEPAM 2 MG/1
2 TABLET ORAL EVERY 6 HOURS PRN
Qty: 120 TABLET | Refills: 0 | Status: SHIPPED | OUTPATIENT
Start: 2020-05-04 | End: 2020-06-09 | Stop reason: SDUPTHER

## 2020-05-04 RX ORDER — TRAZODONE HYDROCHLORIDE 100 MG/1
100 TABLET ORAL
Qty: 30 TABLET | Refills: 5 | Status: SHIPPED | OUTPATIENT
Start: 2020-05-04 | End: 2020-11-02 | Stop reason: SDUPTHER

## 2020-05-28 DIAGNOSIS — G89.29 CHRONIC LOW BACK PAIN: ICD-10-CM

## 2020-05-28 DIAGNOSIS — M54.50 CHRONIC LOW BACK PAIN: ICD-10-CM

## 2020-05-28 RX ORDER — TRAMADOL HYDROCHLORIDE 50 MG/1
TABLET ORAL
Qty: 60 TABLET | Refills: 0 | Status: SHIPPED | OUTPATIENT
Start: 2020-05-28 | End: 2020-06-26 | Stop reason: SDUPTHER

## 2020-06-09 DIAGNOSIS — F41.9 ANXIETY: ICD-10-CM

## 2020-06-09 RX ORDER — LORAZEPAM 2 MG/1
2 TABLET ORAL EVERY 6 HOURS PRN
Qty: 120 TABLET | Refills: 0 | Status: SHIPPED | OUTPATIENT
Start: 2020-06-09 | End: 2020-07-10 | Stop reason: SDUPTHER

## 2020-06-26 ENCOUNTER — TELEPHONE (OUTPATIENT)
Dept: FAMILY MEDICINE CLINIC | Facility: CLINIC | Age: 45
End: 2020-06-26

## 2020-06-26 DIAGNOSIS — M54.50 CHRONIC LOW BACK PAIN: ICD-10-CM

## 2020-06-26 DIAGNOSIS — G89.29 CHRONIC LOW BACK PAIN: ICD-10-CM

## 2020-06-26 RX ORDER — TRAMADOL HYDROCHLORIDE 50 MG/1
TABLET ORAL
Qty: 60 TABLET | Refills: 0 | Status: SHIPPED | OUTPATIENT
Start: 2020-06-26 | End: 2020-07-28 | Stop reason: SDUPTHER

## 2020-07-10 DIAGNOSIS — F41.9 ANXIETY: ICD-10-CM

## 2020-07-10 RX ORDER — LORAZEPAM 2 MG/1
2 TABLET ORAL EVERY 6 HOURS PRN
Qty: 120 TABLET | Refills: 0 | Status: SHIPPED | OUTPATIENT
Start: 2020-07-10 | End: 2020-07-13 | Stop reason: SDUPTHER

## 2020-07-10 NOTE — TELEPHONE ENCOUNTER
Patient has rescheduled missed appointment  She wanted to come into the office  She is scheduled for 8/13/2020  She is requesting a refill for her lorazepam until the next visit  She will be out as of Monday  Please advise

## 2020-07-13 DIAGNOSIS — F41.9 ANXIETY: ICD-10-CM

## 2020-07-13 RX ORDER — LORAZEPAM 2 MG/1
2 TABLET ORAL EVERY 6 HOURS PRN
Qty: 120 TABLET | Refills: 0 | Status: SHIPPED | OUTPATIENT
Start: 2020-07-13 | End: 2020-08-18 | Stop reason: SDUPTHER

## 2020-07-28 DIAGNOSIS — G89.29 CHRONIC LOW BACK PAIN: ICD-10-CM

## 2020-07-28 DIAGNOSIS — M54.50 CHRONIC LOW BACK PAIN: ICD-10-CM

## 2020-07-28 RX ORDER — TRAMADOL HYDROCHLORIDE 50 MG/1
TABLET ORAL
Qty: 60 TABLET | Refills: 0 | Status: SHIPPED | OUTPATIENT
Start: 2020-07-28 | End: 2020-08-27 | Stop reason: SDUPTHER

## 2020-07-28 NOTE — TELEPHONE ENCOUNTER
Medication:  PDMP   07/10/2020  1  07/10/2020  LORAZEPAM 2 MG TABLET  120 0  30  BR SANDRA  [id number]  [facility] (0097)  0   [place]    06/27/2020  1  06/26/2020  TRAMADOL HCL 50 MG TABLET  60 0  30  BR SANDRA  [id number]  [facility] (0097)  0  10 0 MME  Comm Ins  [place]    06/09/2020  1  06/09/2020  LORAZEPAM 2 MG TABLET  120 0  30  BR SANDRA  [id number]  [facility] (0097)  0   Comm Ins  [place]    05/28/2020  1  05/28/2020  TRAMADOL HCL 50 MG TABLET  60 0  30  BR SANDRA  [id number]  [facility] (0097)  0  10 0 MME  Comm Ins  [place]       Active agreement on file -Yes

## 2020-07-31 NOTE — TELEPHONE ENCOUNTER
Site check ok · 

## 2020-08-18 DIAGNOSIS — F41.9 ANXIETY: ICD-10-CM

## 2020-08-18 RX ORDER — LORAZEPAM 2 MG/1
2 TABLET ORAL EVERY 6 HOURS PRN
Qty: 120 TABLET | Refills: 0 | Status: SHIPPED | OUTPATIENT
Start: 2020-08-18 | End: 2020-09-25 | Stop reason: SDUPTHER

## 2020-08-18 NOTE — TELEPHONE ENCOUNTER
Medication:  PDMP   08/02/2020  1  07/28/2020  TRAMADOL HCL 50 MG TABLET  50 0  25  BR SANDRA  [id number]  [facility] (0097)  1  10 0 MME  Comm Ins  [place]    07/28/2020  1  07/28/2020  TRAMADOL HCL 50 MG TABLET  10 0  5  BR SANDRA  [id number]  [facility] (0097)  0  10 0 MME  Comm Ins  [place]    07/10/2020  1  07/10/2020  LORAZEPAM 2 MG TABLET  120 0  30  BR SANDRA  [id number]  [facility] (0097)  0   Comm Ins  [place]       Active agreement on file -Yes

## 2020-08-27 DIAGNOSIS — M54.50 CHRONIC LOW BACK PAIN: ICD-10-CM

## 2020-08-27 DIAGNOSIS — G89.29 CHRONIC LOW BACK PAIN: ICD-10-CM

## 2020-08-27 DIAGNOSIS — F41.9 ANXIETY: ICD-10-CM

## 2020-08-27 RX ORDER — SERTRALINE HYDROCHLORIDE 100 MG/1
200 TABLET, FILM COATED ORAL DAILY
Qty: 60 TABLET | Refills: 5 | Status: SHIPPED | OUTPATIENT
Start: 2020-08-27 | End: 2021-03-08

## 2020-08-27 RX ORDER — TRAMADOL HYDROCHLORIDE 50 MG/1
TABLET ORAL
Qty: 60 TABLET | Refills: 0 | Status: SHIPPED | OUTPATIENT
Start: 2020-08-27 | End: 2020-09-25 | Stop reason: SDUPTHER

## 2020-08-27 NOTE — TELEPHONE ENCOUNTER
08/18/2020  1  08/18/2020  LORAZEPAM 2 MG TABLET  120 0  30  BR SANDRA  [id number]  [facility] (0097)  0   Comm Ins  [place]    08/02/2020  1  07/28/2020  TRAMADOL HCL 50 MG TABLET  50 0  25  BR SANDRA  [id number]  [facility] (0097)  1  10 0 MME  Comm Ins  [place]    07/28/2020  1  07/28/2020  TRAMADOL HCL 50 MG TABLET  10 0  5  BR SANDRA  [id number]  [facility] (0097)  0  10 0 MME  Comm Ins  [place]    07/10/2020  1  07/10/2020  LORAZEPAM 2 MG TABLET  120 0  30  BR SANDRA  [id number]  [facility] (0097)  0   Comm Ins  [place]    06/27/2020  1  06/26/2020  TRAMADOL HCL 50 MG TABLET  60 0  30  BR SANDRA  [id number]  [facility] (0097)  0  10 0 MME  Comm Ins  [place]    06/09/2020  1  06/09/2020  LORAZEPAM 2 MG TABLET  120 0  30  BR SANDRA  [id number]  [facility] (0097)  0   Comm Ins  [place]    05/28/2020  1  05/28/2020  TRAMADOL HCL 50 MG TABLET  60 0  30  BR SANDRA  [id number]  [facility] (0097)  0  10 0 MME  Comm Ins  [place]    05/04/2020  1  05/04/2020  LORAZEPAM 2 MG TABLET  120 0  30  BR SANDRA  [id number]  [facility] (0097)  0   Comm Ins  [place]    04/28/2020  1  04/27/2020  TRAMADOL HCL 50 MG TABLET  60 0  30  BR SANDRA  [id number]  [facility] (0097)  0  10 0 MME  Comm Ins  [place]    04/03/2020  2  04/03/2020  LORAZEPAM 2 MG TABLET  120 0  30  BR SANDRA  [id number]  [facility] (6642)  0   Medicaid  [place]

## 2020-08-28 ENCOUNTER — TELEPHONE (OUTPATIENT)
Dept: NEUROLOGY | Facility: CLINIC | Age: 45
End: 2020-08-28

## 2020-09-01 ENCOUNTER — TELEPHONE (OUTPATIENT)
Dept: FAMILY MEDICINE CLINIC | Facility: CLINIC | Age: 45
End: 2020-09-01

## 2020-09-01 DIAGNOSIS — R23.2 HOT FLASHES: Primary | ICD-10-CM

## 2020-09-01 NOTE — TELEPHONE ENCOUNTER
Pt called to ask if Dr Fareed Baltazar can prescribe something for her hot flashes  Pt states this has been very challenging to deal with along with her M S  Pt states that if Dr Fareed Baltazar is unable to prescribe something for these hot flashes, if something over the counter can be recommended  Please advise

## 2020-09-02 RX ORDER — GABAPENTIN 300 MG/1
300 CAPSULE ORAL 3 TIMES DAILY
Qty: 90 CAPSULE | Refills: 1 | Status: SHIPPED | OUTPATIENT
Start: 2020-09-02 | End: 2021-07-08 | Stop reason: SDUPTHER

## 2020-09-02 NOTE — TELEPHONE ENCOUNTER
1. Have you been to the ER, urgent care clinic since your last visit? Hospitalized since your last visit? No    2. Have you seen or consulted any other health care providers outside of the [address] [place] [address] since your last visit? Include any pap smears or colon screening. No   Chief Complaint   Patient presents with    Establish Care     Not fasting  Abuse Screening Questionnaire 8/27/2018   Do you ever feel afraid of your partner? N   Are you in a relationship with someone who physically or mentally threatens you? N   Is it safe for you to go home?  Y     PHQ over the last two weeks 8/27/2018   Little interest or pleasure in doing things Not at all   Feeling down, depressed, irritable, or hopeless Not at all   Total Score PHQ 2 0 L/m for pt with info

## 2020-09-25 DIAGNOSIS — M54.50 CHRONIC LOW BACK PAIN: ICD-10-CM

## 2020-09-25 DIAGNOSIS — F41.9 ANXIETY: ICD-10-CM

## 2020-09-25 DIAGNOSIS — G89.29 CHRONIC LOW BACK PAIN: ICD-10-CM

## 2020-09-25 RX ORDER — TRAMADOL HYDROCHLORIDE 50 MG/1
TABLET ORAL
Qty: 60 TABLET | Refills: 0 | Status: SHIPPED | OUTPATIENT
Start: 2020-09-25 | End: 2020-10-26 | Stop reason: SDUPTHER

## 2020-09-25 RX ORDER — LORAZEPAM 2 MG/1
2 TABLET ORAL EVERY 6 HOURS PRN
Qty: 120 TABLET | Refills: 0 | Status: SHIPPED | OUTPATIENT
Start: 2020-09-25 | End: 2020-10-26 | Stop reason: SDUPTHER

## 2020-09-25 NOTE — TELEPHONE ENCOUNTER
08/27/2020  1  08/27/2020  TRAMADOL HCL 50 MG TABLET  60 0  30  BR SANDRA  [id number]  [facility] (0097)  0  10 0 MME  Comm Ins  [place]    08/18/2020  1  08/18/2020  LORAZEPAM 2 MG TABLET  120 0  30  BR SANDRA  [id number]  [facility] (0097)  0   Comm Ins  [place]    08/02/2020  1  07/28/2020  TRAMADOL HCL 50 MG TABLET  50 0  25  BR SANDRA  [id number]  [facility] (0097)  1  10 0 MME  Comm Ins  [place]    07/28/2020  1  07/28/2020  TRAMADOL HCL 50 MG TABLET  10 0  5  BR SANDRA  [id number]  [facility] (0097)  0  10 0 MME  Comm Ins  [place]    07/10/2020  1  07/10/2020  LORAZEPAM 2 MG TABLET  120 0  30  BR SANDRA  [id number]  [facility] (0097)  0   Comm Ins  [place]    06/27/2020  1  06/26/2020  TRAMADOL HCL 50 MG TABLET  60 0  30  BR SANDRA  [id number]  [facility] (0097)  0  10 0 MME  Comm Ins  [place]    06/09/2020  1  06/09/2020  LORAZEPAM 2 MG TABLET  120 0  30  BR SANDRA  [id number]  [facility] (0097)  0   Comm Ins  [place]    05/28/2020  1  05/28/2020  TRAMADOL HCL 50 MG TABLET  60 0  30  BR SANDRA  [id number]  [facility] (0097)  0  10 0 MME  Comm Ins  [place]    05/04/2020  1  05/04/2020  LORAZEPAM 2 MG TABLET  120 0  30  BR SANDRA  [id number]  [facility] (0097)  0   Comm Ins  [place]

## 2020-10-24 DIAGNOSIS — G47.00 INSOMNIA, UNSPECIFIED TYPE: ICD-10-CM

## 2020-10-26 DIAGNOSIS — F41.9 ANXIETY: ICD-10-CM

## 2020-10-26 DIAGNOSIS — M54.50 CHRONIC LOW BACK PAIN: ICD-10-CM

## 2020-10-26 DIAGNOSIS — G89.29 CHRONIC LOW BACK PAIN: ICD-10-CM

## 2020-10-26 RX ORDER — TRAZODONE HYDROCHLORIDE 100 MG/1
TABLET ORAL
Qty: 30 TABLET | Refills: 5 | OUTPATIENT
Start: 2020-10-26

## 2020-10-26 RX ORDER — TRAMADOL HYDROCHLORIDE 50 MG/1
TABLET ORAL
Qty: 60 TABLET | Refills: 0 | Status: SHIPPED | OUTPATIENT
Start: 2020-10-26 | End: 2020-11-25 | Stop reason: SDUPTHER

## 2020-10-26 RX ORDER — LORAZEPAM 2 MG/1
2 TABLET ORAL EVERY 6 HOURS PRN
Qty: 120 TABLET | Refills: 0 | Status: SHIPPED | OUTPATIENT
Start: 2020-10-26 | End: 2020-12-15 | Stop reason: SDUPTHER

## 2020-11-02 ENCOUNTER — TELEMEDICINE (OUTPATIENT)
Dept: FAMILY MEDICINE CLINIC | Facility: CLINIC | Age: 45
End: 2020-11-02
Payer: COMMERCIAL

## 2020-11-02 VITALS — BODY MASS INDEX: 17.13 KG/M2 | WEIGHT: 74 LBS | HEIGHT: 55 IN

## 2020-11-02 DIAGNOSIS — M54.50 CHRONIC LOW BACK PAIN, UNSPECIFIED BACK PAIN LATERALITY, UNSPECIFIED WHETHER SCIATICA PRESENT: ICD-10-CM

## 2020-11-02 DIAGNOSIS — G47.00 INSOMNIA, UNSPECIFIED TYPE: ICD-10-CM

## 2020-11-02 DIAGNOSIS — F33.1 MODERATE EPISODE OF RECURRENT MAJOR DEPRESSIVE DISORDER (HCC): Primary | ICD-10-CM

## 2020-11-02 DIAGNOSIS — R32 URINARY INCONTINENCE, UNSPECIFIED TYPE: ICD-10-CM

## 2020-11-02 DIAGNOSIS — G89.29 CHRONIC LOW BACK PAIN, UNSPECIFIED BACK PAIN LATERALITY, UNSPECIFIED WHETHER SCIATICA PRESENT: ICD-10-CM

## 2020-11-02 DIAGNOSIS — F41.9 ANXIETY: ICD-10-CM

## 2020-11-02 PROCEDURE — 3008F BODY MASS INDEX DOCD: CPT | Performed by: FAMILY MEDICINE

## 2020-11-02 PROCEDURE — 99214 OFFICE O/P EST MOD 30 MIN: CPT | Performed by: FAMILY MEDICINE

## 2020-11-02 PROCEDURE — 3725F SCREEN DEPRESSION PERFORMED: CPT | Performed by: FAMILY MEDICINE

## 2020-11-02 RX ORDER — ARIPIPRAZOLE 15 MG/1
15 TABLET ORAL DAILY
Qty: 30 TABLET | Refills: 1 | Status: SHIPPED | OUTPATIENT
Start: 2020-11-02 | End: 2021-01-04

## 2020-11-02 RX ORDER — TRAZODONE HYDROCHLORIDE 100 MG/1
200 TABLET ORAL
Qty: 60 TABLET | Refills: 5 | Status: SHIPPED | OUTPATIENT
Start: 2020-11-02 | End: 2021-04-26 | Stop reason: SDUPTHER

## 2020-11-23 ENCOUNTER — TELEPHONE (OUTPATIENT)
Dept: FAMILY MEDICINE CLINIC | Facility: CLINIC | Age: 45
End: 2020-11-23

## 2020-11-23 DIAGNOSIS — Z72.0 TOBACCO USE: Primary | ICD-10-CM

## 2020-11-23 RX ORDER — NICOTINE 21 MG/24HR
1 PATCH, TRANSDERMAL 24 HOURS TRANSDERMAL EVERY 24 HOURS
Qty: 28 PATCH | Refills: 0 | Status: SHIPPED | OUTPATIENT
Start: 2020-11-23 | End: 2021-07-08

## 2020-11-25 DIAGNOSIS — M54.50 CHRONIC LOW BACK PAIN: ICD-10-CM

## 2020-11-25 DIAGNOSIS — G89.29 CHRONIC LOW BACK PAIN: ICD-10-CM

## 2020-11-25 RX ORDER — TRAMADOL HYDROCHLORIDE 50 MG/1
TABLET ORAL
Qty: 60 TABLET | Refills: 0 | Status: SHIPPED | OUTPATIENT
Start: 2020-11-25 | End: 2020-12-30 | Stop reason: SDUPTHER

## 2020-11-27 ENCOUNTER — TELEMEDICINE (OUTPATIENT)
Dept: FAMILY MEDICINE CLINIC | Facility: CLINIC | Age: 45
End: 2020-11-27
Payer: COMMERCIAL

## 2020-11-27 DIAGNOSIS — B34.9 VIRAL INFECTION, UNSPECIFIED: ICD-10-CM

## 2020-11-27 DIAGNOSIS — B34.9 VIRAL INFECTION, UNSPECIFIED: Primary | ICD-10-CM

## 2020-11-27 PROCEDURE — U0003 INFECTIOUS AGENT DETECTION BY NUCLEIC ACID (DNA OR RNA); SEVERE ACUTE RESPIRATORY SYNDROME CORONAVIRUS 2 (SARS-COV-2) (CORONAVIRUS DISEASE [COVID-19]), AMPLIFIED PROBE TECHNIQUE, MAKING USE OF HIGH THROUGHPUT TECHNOLOGIES AS DESCRIBED BY CMS-2020-01-R: HCPCS | Performed by: FAMILY MEDICINE

## 2020-11-27 PROCEDURE — 4004F PT TOBACCO SCREEN RCVD TLK: CPT | Performed by: FAMILY MEDICINE

## 2020-11-27 PROCEDURE — 99213 OFFICE O/P EST LOW 20 MIN: CPT | Performed by: FAMILY MEDICINE

## 2020-11-29 LAB — SARS-COV-2 RNA SPEC QL NAA+PROBE: NOT DETECTED

## 2020-12-03 ENCOUNTER — OFFICE VISIT (OUTPATIENT)
Dept: FAMILY MEDICINE CLINIC | Facility: CLINIC | Age: 45
End: 2020-12-03
Payer: COMMERCIAL

## 2020-12-03 VITALS
TEMPERATURE: 98.2 F | OXYGEN SATURATION: 97 % | WEIGHT: 82 LBS | HEIGHT: 55 IN | DIASTOLIC BLOOD PRESSURE: 68 MMHG | SYSTOLIC BLOOD PRESSURE: 92 MMHG | HEART RATE: 69 BPM | BODY MASS INDEX: 18.97 KG/M2

## 2020-12-03 DIAGNOSIS — S16.1XXA STRAIN OF NECK MUSCLE, INITIAL ENCOUNTER: Primary | ICD-10-CM

## 2020-12-03 DIAGNOSIS — Z13.29 THYROID DISORDER SCREEN: ICD-10-CM

## 2020-12-03 DIAGNOSIS — Z13.0 SCREENING FOR DEFICIENCY ANEMIA: ICD-10-CM

## 2020-12-03 DIAGNOSIS — F41.9 ANXIETY: ICD-10-CM

## 2020-12-03 DIAGNOSIS — Z13.220 SCREENING FOR HYPERLIPIDEMIA: ICD-10-CM

## 2020-12-03 DIAGNOSIS — Z00.00 HEALTHCARE MAINTENANCE: ICD-10-CM

## 2020-12-03 DIAGNOSIS — R32 URINARY INCONTINENCE, UNSPECIFIED TYPE: ICD-10-CM

## 2020-12-03 DIAGNOSIS — F33.1 MODERATE EPISODE OF RECURRENT MAJOR DEPRESSIVE DISORDER (HCC): ICD-10-CM

## 2020-12-03 DIAGNOSIS — Z12.31 BREAST CANCER SCREENING BY MAMMOGRAM: ICD-10-CM

## 2020-12-03 DIAGNOSIS — Z13.1 SCREENING FOR DIABETES MELLITUS (DM): ICD-10-CM

## 2020-12-03 DIAGNOSIS — Z72.0 TOBACCO USE: ICD-10-CM

## 2020-12-03 PROCEDURE — 3008F BODY MASS INDEX DOCD: CPT | Performed by: FAMILY MEDICINE

## 2020-12-03 PROCEDURE — 99396 PREV VISIT EST AGE 40-64: CPT | Performed by: FAMILY MEDICINE

## 2020-12-03 PROCEDURE — 4004F PT TOBACCO SCREEN RCVD TLK: CPT | Performed by: FAMILY MEDICINE

## 2020-12-03 PROCEDURE — 3725F SCREEN DEPRESSION PERFORMED: CPT | Performed by: FAMILY MEDICINE

## 2020-12-03 RX ORDER — CYCLOBENZAPRINE HCL 10 MG
10 TABLET ORAL 3 TIMES DAILY PRN
Qty: 30 TABLET | Refills: 0 | Status: SHIPPED | OUTPATIENT
Start: 2020-12-03

## 2020-12-07 ENCOUNTER — NURSE TRIAGE (OUTPATIENT)
Dept: OTHER | Facility: OTHER | Age: 45
End: 2020-12-07

## 2020-12-08 ENCOUNTER — TELEPHONE (OUTPATIENT)
Dept: OTHER | Facility: OTHER | Age: 45
End: 2020-12-08

## 2020-12-08 DIAGNOSIS — R32 URINARY INCONTINENCE, UNSPECIFIED TYPE: ICD-10-CM

## 2020-12-08 RX ORDER — OXYBUTYNIN CHLORIDE 5 MG/1
5 TABLET, EXTENDED RELEASE ORAL DAILY
Qty: 30 TABLET | Refills: 5 | Status: SHIPPED | OUTPATIENT
Start: 2020-12-08 | End: 2021-07-08 | Stop reason: SDUPTHER

## 2020-12-09 ENCOUNTER — TELEPHONE (OUTPATIENT)
Dept: FAMILY MEDICINE CLINIC | Facility: CLINIC | Age: 45
End: 2020-12-09

## 2020-12-09 DIAGNOSIS — F41.9 ANXIETY: ICD-10-CM

## 2020-12-09 DIAGNOSIS — S22.39XS CLOSED FRACTURE OF ONE RIB, UNSPECIFIED LATERALITY, SEQUELA: Primary | ICD-10-CM

## 2020-12-10 RX ORDER — OXYCODONE HYDROCHLORIDE AND ACETAMINOPHEN 5; 325 MG/1; MG/1
1 TABLET ORAL EVERY 4 HOURS PRN
Qty: 30 TABLET | Refills: 0 | Status: SHIPPED | OUTPATIENT
Start: 2020-12-10 | End: 2021-07-08

## 2020-12-15 ENCOUNTER — TELEPHONE (OUTPATIENT)
Dept: FAMILY MEDICINE CLINIC | Facility: CLINIC | Age: 45
End: 2020-12-15

## 2020-12-15 RX ORDER — LORAZEPAM 2 MG/1
2 TABLET ORAL EVERY 6 HOURS PRN
Qty: 120 TABLET | Refills: 0 | Status: SHIPPED | OUTPATIENT
Start: 2020-12-15 | End: 2021-01-18 | Stop reason: SDUPTHER

## 2020-12-30 DIAGNOSIS — G89.29 CHRONIC LOW BACK PAIN: ICD-10-CM

## 2020-12-30 DIAGNOSIS — M54.50 CHRONIC LOW BACK PAIN: ICD-10-CM

## 2020-12-31 RX ORDER — TRAMADOL HYDROCHLORIDE 50 MG/1
TABLET ORAL
Qty: 60 TABLET | Refills: 0 | Status: SHIPPED | OUTPATIENT
Start: 2020-12-31 | End: 2021-03-02 | Stop reason: SDUPTHER

## 2021-01-03 DIAGNOSIS — F33.1 MODERATE EPISODE OF RECURRENT MAJOR DEPRESSIVE DISORDER (HCC): ICD-10-CM

## 2021-01-04 RX ORDER — ARIPIPRAZOLE 15 MG/1
TABLET ORAL
Qty: 30 TABLET | Refills: 1 | Status: SHIPPED | OUTPATIENT
Start: 2021-01-04 | End: 2021-04-13

## 2021-01-04 NOTE — TELEPHONE ENCOUNTER
Medication:  PDMP   12/31/2020  1  12/31/2020  TRAMADOL HCL 50 MG TABLET  60 0  30  BR SANDRA  [id number]  [facility] (0097)  0  10 0 MME  Comm Ins  [place]    12/15/2020  1  12/15/2020  LORAZEPAM 2 MG TABLET  120 0  30  BR SANDRA  [id number]  [facility] (0097)  0   Comm Ins  [place]    12/14/2020  1  12/10/2020  OXYCODONE-ACETAMINOPHEN 5-325  30 0  5  BR SANDRA  [id number]  [facility] (0097)  0  45  0 MME  Comm Ins  [place]    11/25/2020  1  11/25/2020  TRAMADOL HCL 50 MG TABLET  60 0  30  LORETTA RUSH  [id number]  [facility] (0097)  0  10 0 MME  Comm Ins  [place]        Active agreement on file -Yes

## 2021-01-18 DIAGNOSIS — F41.9 ANXIETY: ICD-10-CM

## 2021-01-18 RX ORDER — LORAZEPAM 2 MG/1
2 TABLET ORAL EVERY 6 HOURS PRN
Qty: 120 TABLET | Refills: 0 | Status: SHIPPED | OUTPATIENT
Start: 2021-01-18 | End: 2021-03-02 | Stop reason: SDUPTHER

## 2021-01-18 NOTE — TELEPHONE ENCOUNTER
Medication:  PDMP   12/31/2020  1  12/31/2020  TRAMADOL HCL 50 MG TABLET  60 0  30  BR SANDRA  [id number]  [facility] (0097)  0  10 0 MME  Comm Ins  [place]    12/15/2020  1  12/15/2020  LORAZEPAM 2 MG TABLET  120 0  30  BR SANDRA  [id number]  [facility] (0097)  0   Comm Ins  [place]    12/14/2020  1  12/10/2020  OXYCODONE-ACETAMINOPHEN 5-325  30 0  5  BR SANDRA  [id number]  [facility] (0097)  0  45  0 MME  Comm Ins  [place]    11/25/2020  1  11/25/2020  TRAMADOL HCL 50 MG TABLET  60 0  30  LORETTA RUSH  [id number]  [facility] (0097)  0  10 0 MME  Comm Ins  [place]    10/26/2020  1  10/26/2020  TRAMADOL HCL 50 MG TABLET  60 0  30  BR SANDRA  [id number]  [facility] (0097)  0  10 0 MME  Comm Ins  [place]        Active agreement on file -Yes

## 2021-03-02 DIAGNOSIS — G89.29 CHRONIC LOW BACK PAIN: ICD-10-CM

## 2021-03-02 DIAGNOSIS — F41.9 ANXIETY: ICD-10-CM

## 2021-03-02 DIAGNOSIS — M54.50 CHRONIC LOW BACK PAIN: ICD-10-CM

## 2021-03-02 RX ORDER — TRAMADOL HYDROCHLORIDE 50 MG/1
TABLET ORAL
Qty: 60 TABLET | Refills: 0 | Status: SHIPPED | OUTPATIENT
Start: 2021-03-02 | End: 2021-04-26 | Stop reason: SDUPTHER

## 2021-03-02 RX ORDER — LORAZEPAM 2 MG/1
2 TABLET ORAL EVERY 6 HOURS PRN
Qty: 120 TABLET | Refills: 0 | Status: SHIPPED | OUTPATIENT
Start: 2021-03-02 | End: 2021-03-04 | Stop reason: SDUPTHER

## 2021-03-02 NOTE — TELEPHONE ENCOUNTER
Medication:  PDMP   01/18/2021  1  01/18/2021  LORAZEPAM 2 MG TABLET  120 0  30  BR SANDRA  [id number]  [facility] (0097)  0   Comm Ins  [place]    12/31/2020  1  12/31/2020  TRAMADOL HCL 50 MG TABLET  60 0  30  BR SANDRA  [id number]  [facility] (0097)  0  10 0 MME  Comm Ins  [place]    12/15/2020  1  12/15/2020  LORAZEPAM 2 MG TABLET  120 0  30  BR SANDRA  [id number]  [facility] (0097)  0   Comm Ins  [place]    12/14/2020  1  12/10/2020  OXYCODONE-ACETAMINOPHEN 5-325  30 0  5  BR SANDRA  [id number]  [facility] (0097)  0  45  0 MME  Comm Ins  [place]    11/25/2020  1  11/25/2020  TRAMADOL HCL 50 MG TABLET  60 0  30  LORETTA RUSH  [id number]  [facility] (0097)  0  10 0 MME  Comm Ins  [place]       Active agreement on file -Yes

## 2021-03-04 ENCOUNTER — TELEPHONE (OUTPATIENT)
Dept: FAMILY MEDICINE CLINIC | Facility: CLINIC | Age: 46
End: 2021-03-04

## 2021-03-04 DIAGNOSIS — F41.9 ANXIETY: ICD-10-CM

## 2021-03-04 RX ORDER — LORAZEPAM 2 MG/1
2 TABLET ORAL EVERY 6 HOURS PRN
Qty: 120 TABLET | Refills: 0 | Status: SHIPPED | OUTPATIENT
Start: 2021-03-04 | End: 2021-04-13 | Stop reason: SDUPTHER

## 2021-03-04 NOTE — TELEPHONE ENCOUNTER
T/c from pt - her pharmacy told her that they reached out to us for PA for lorazepam (requested refill earlier this week) and we had not responded---no note of this in system anywhere  Lorazepam needs PA

## 2021-03-04 NOTE — TELEPHONE ENCOUNTER
Medication:  PDMP   01/18/2021  1  01/18/2021  LORAZEPAM 2 MG TABLET  120 0  30  BR SANDRA  [id number]  [facility] (0097)  0   Comm Ins  [place]    12/31/2020  1  12/31/2020  TRAMADOL HCL 50 MG TABLET  60 0  30  BR SANDRA  [id number]  [facility] (0097)  0  10 0 MME  Comm Ins  [place]    12/15/2020  1  12/15/2020  LORAZEPAM 2 MG TABLET  120 0  30  BR SANDRA  [id number]  [facility] (0097)  0   Comm Ins  [place]    12/14/2020  1  12/10/2020  OXYCODONE-ACETAMINOPHEN 5-325  30 0  5  BR SANDRA  [id number]  [facility] (0097)  0  45  0 MME  Comm Ins  [place]    11/25/2020  1  11/25/2020  TRAMADOL HCL 50 MG TABLET  60 0  30  LORETTA RUSH  [id number]  [facility] (0097)  0  10 0 MME  Comm Ins  [place]        Active agreement on file -Yes      Dr Cam Santos pt  Pharmacy did not receive rx transmittal on 3/2/21

## 2021-03-07 DIAGNOSIS — F41.9 ANXIETY: ICD-10-CM

## 2021-03-08 RX ORDER — SERTRALINE HYDROCHLORIDE 100 MG/1
TABLET, FILM COATED ORAL
Qty: 60 TABLET | Refills: 5 | Status: SHIPPED | OUTPATIENT
Start: 2021-03-08 | End: 2021-10-04

## 2021-03-08 NOTE — TELEPHONE ENCOUNTER
PA outcome :     Lorazepam Oral tablet 2 mg - has been approved as of 03/05/2021 - for 12 months exp   03/05/2022  Quantity of 136 for 34 days, pt informed via phone call  [facility] informed via fax and phone call

## 2021-04-13 DIAGNOSIS — F41.9 ANXIETY: ICD-10-CM

## 2021-04-13 DIAGNOSIS — F33.1 MODERATE EPISODE OF RECURRENT MAJOR DEPRESSIVE DISORDER (HCC): ICD-10-CM

## 2021-04-13 RX ORDER — ARIPIPRAZOLE 15 MG/1
TABLET ORAL
Qty: 30 TABLET | Refills: 1 | Status: SHIPPED | OUTPATIENT
Start: 2021-04-13 | End: 2021-07-08 | Stop reason: SDUPTHER

## 2021-04-13 RX ORDER — LORAZEPAM 2 MG/1
2 TABLET ORAL EVERY 6 HOURS PRN
Qty: 120 TABLET | Refills: 0 | Status: SHIPPED | OUTPATIENT
Start: 2021-04-13 | End: 2021-06-02 | Stop reason: SDUPTHER

## 2021-04-13 NOTE — TELEPHONE ENCOUNTER
11/25/2020  1  11/25/2020  TRAMADOL HCL 50 MG TABLET  60 0  30  LORETTA RUSH  [id number]  [facility] (0097)  0  10 0 MME  Comm Ins  [place]    10/26/2020  1  10/26/2020  TRAMADOL HCL 50 MG TABLET  60 0  30  BR SANDRA  [id number]  [facility] (0097)  0  10 0 MME  Comm Ins  [place]    10/26/2020  1  10/26/2020  LORAZEPAM 2 MG TABLET  120 0  30  BR SANDRA  [id number]  [facility] (0097)  0   Comm Ins  [place]    09/25/2020  1  09/25/2020  TRAMADOL HCL 50 MG TABLET  60 0  30  BR SANDRA  [id number]  [facility] (0097)  0  10 0 MME  Comm Ins  [place]    09/25/2020  1  09/25/2020  LORAZEPAM 2 MG TABLET  120 0  30  BR SANDRA  [id number]  [facility] (0097)  0   Comm Ins  [place]    08/27/2020  1  08/27/2020  TRAMADOL HCL 50 MG TABLET  60 0  30  BR SANDRA  [id number]  [facility] (0097)  0  10 0 MME  Comm Ins  [place]    08/18/2020  1  08/18/2020  LORAZEPAM 2 MG TABLET  120 0  30  BR SANDRA  [id number]  [facility] (0097)  0   Comm Ins  [place]    08/02/2020  1  07/28/2020  TRAMADOL HCL 50 MG TABLET  50 0  25  BR SANDRA  [id number]  [facility] (0097)  1  10 0 MME  Comm Ins  [place]    07/28/2020  1  07/28/2020  TRAMADOL HCL 50 MG TABLET  10 0  5  BR SANDRA  [id number]  [facility] (0097)  0  10 0 MME  Comm Ins  [place]    07/10/2020  1  07/10/2020  LORAZEPAM 2 MG TABLET  120 0  30  BR SANDRA  [id number]  [facility] (0097)  0   Comm Ins  [place]    06/27/2020  1  06/26/2020  TRAMADOL HCL 50 MG TABLET  60 0  30  BR SANDRA  [id number]  [facility] (0097)  0  10 0 MME  Comm Ins  [place]    06/09/2020  1  06/09/2020  LORAZEPAM 2 MG TABLET  120 0  30  BR SANDRA  [id number]  [facility] (0097)  0   Comm Ins  [place]    05/28/2020  1  05/28/2020  TRAMADOL HCL 50 MG TABLET  60 0  30  BR SANDRA  [id number]  [facility] (0097)  0  10 0 MME  Comm Ins  [place]

## 2021-04-26 DIAGNOSIS — M54.50 CHRONIC LOW BACK PAIN: ICD-10-CM

## 2021-04-26 DIAGNOSIS — G47.00 INSOMNIA, UNSPECIFIED TYPE: ICD-10-CM

## 2021-04-26 DIAGNOSIS — G89.29 CHRONIC LOW BACK PAIN: ICD-10-CM

## 2021-04-26 RX ORDER — TRAMADOL HYDROCHLORIDE 50 MG/1
TABLET ORAL
Qty: 60 TABLET | Refills: 0 | Status: SHIPPED | OUTPATIENT
Start: 2021-04-26 | End: 2021-05-28 | Stop reason: SDUPTHER

## 2021-04-26 RX ORDER — TRAZODONE HYDROCHLORIDE 100 MG/1
200 TABLET ORAL
Qty: 60 TABLET | Refills: 5 | Status: SHIPPED | OUTPATIENT
Start: 2021-04-26 | End: 2021-11-03

## 2021-04-26 NOTE — TELEPHONE ENCOUNTER
04/17/2021  1  04/13/2021  LORAZEPAM 2 MG TABLET  120 0  30  BR SANDRA  [id number]  [facility] (0097)  0   Comm Ins  [place]    04/10/2021  1  03/04/2021  LORAZEPAM 2 MG TABLET  30 0  7  PA CRU  [id number]  [facility] (0097)  1   Comm Ins  PA    03/04/2021  1  03/04/2021  LORAZEPAM 2 MG TABLET  90 0  30  PA CRU  [id number]  [facility] (0097)  0   Comm Ins  [place]    03/03/2021  1  03/02/2021  TRAMADOL HCL 50 MG TABLET  60 0  30  BR SANDRA  [id number]  [facility] (0097)  0  10 0 MME  Comm Ins  [place]    01/18/2021  1  01/18/2021  LORAZEPAM 2 MG TABLET  120 0  30  BR SANDRA  [id number]  [facility]          Follow up appt will be made

## 2021-05-28 DIAGNOSIS — G89.29 CHRONIC LOW BACK PAIN: ICD-10-CM

## 2021-05-28 DIAGNOSIS — M54.50 CHRONIC LOW BACK PAIN: ICD-10-CM

## 2021-05-28 RX ORDER — TRAMADOL HYDROCHLORIDE 50 MG/1
TABLET ORAL
Qty: 60 TABLET | Refills: 0 | Status: SHIPPED | OUTPATIENT
Start: 2021-05-28 | End: 2021-06-29 | Stop reason: SDUPTHER

## 2021-05-28 NOTE — TELEPHONE ENCOUNTER
Medication:  PDMP   04/27/2021  1  04/26/2021  TRAMADOL HCL 50 MG TABLET  60 0  30  BR SANDRA  [id number]  [facility] (0097)  0  10 0 MME  Comm Ins  [place]    04/17/2021  1  04/13/2021  LORAZEPAM 2 MG TABLET  120 0  30  BR SANDRA  [id number]  [facility] (0097)  0   Comm Ins  PA    04/10/2021  1  03/04/2021  LORAZEPAM 2 MG TABLET  30 0  7  PA CRU  [id number]  [facility] (0097)  1   Comm Ins  PA    03/04/2021  1  03/04/2021  LORAZEPAM 2 MG TABLET  90 0  30  PA CRU  [id number]  [facility] (0097)  0   Comm Ins  [place]    03/03/2021  1  03/02/2021  TRAMADOL HCL 50 MG TABLET  60 0  30  BR SANDRA  [id number]  [facility] (0097)  0           Active agreement on file -Yes

## 2021-06-02 DIAGNOSIS — F41.9 ANXIETY: ICD-10-CM

## 2021-06-02 NOTE — TELEPHONE ENCOUNTER
05/28/2021 1 05/28/2021   TRAMADOL HCL 50 MG TABLET  60 0 30 BR SANDRA   [id number]  [facility] (0097) 0 10 0 MME Comm Ins PA  04/27/2021 1 04/26/2021   TRAMADOL HCL 50 MG TABLET  60 0 30 BR SANDRA   [id number]  [facility] (0097) 0 10 0 MME Comm Ins PA  04/17/2021 1 04/13/2021   LORAZEPAM 2 MG TABLET  120 0 30 BR SANDRA   [id number]  [facility] (0097) 0  Comm Ins PA  04/10/2021 1 03/04/2021   LORAZEPAM 2 MG TABLET  30 0 7 PA CRU   [id number]  [facility] (0097) 1  Comm Ins PA  03/04/2021 1 03/04/2021   LORAZEPAM 2 MG TABLET  90 0 30 PA CRU   [id number]  [facility] (0097) 0  Comm Ins PA  03/03/2021 1 03/02/2021   TRAMADOL HCL 50 MG TABLET  60 0 30 BR SANDRA   [id number]  [facility] (0097) 0 10 0 MME Comm Ins PA  01/18/2021 1 01/18/2021   LORAZEPAM 2 MG TABLET  120 0 30 BR SANDRA   [id number]  [facility] (0097) 0  Comm Ins PA  12/31/2020 1 12/31/2020   TRAMADOL HCL 50 MG TABLET  60 0 30 BR SANDRA   [id number]  [facility] (0097) 0 10 0 MME Comm Ins PA  12/15/2020 1 12/15/2020   LORAZEPAM 2 MG TABLET  120 0 30 BR SANDRA   [id number]  [facility] (0097) 0  Comm Ins PA  12/14/2020 1 12/10/2020   OXYCODONE-ACETAMINOPHEN 5-325  30 0 5 BR SANDRA   [id number]  [facility] (0097) 0 45  0 MME Comm Ins PA  11/25/2020 1 11/25/2020   TRAMADOL HCL 50 MG TABLET  60 0 30 LORETTA RUSH   [id number]  [facility] (0097) 0 10 0 MME Comm Ins PA  10/26/2020 1 10/26/2020   TRAMADOL HCL 50 MG TABLET  60 0 30 BR SANDRA   [id number]  [facility] (0097) 0 10 0 MME Comm Ins PA  10/26/2020 1 10/26/2020   LORAZEPAM 2 MG TABLET  120 0 30 BR SANDRA   [id number]  [facility] (0097) 0  Comm Ins PA  09/25/2020 1 09/25/2020   TRAMADOL HCL 50 MG TABLET  60 0 30 BR SANDRA   [id number]  [facility] (0097) 0 10 0 MME Comm Ins PA  09/25/2020 1 09/25/2020   LORAZEPAM 2 MG TABLET  120 0 30 BR SANDRA   [id number]  [facility] (0097) 0  Comm Ins P

## 2021-06-03 RX ORDER — LORAZEPAM 2 MG/1
2 TABLET ORAL EVERY 6 HOURS PRN
Qty: 120 TABLET | Refills: 0 | Status: SHIPPED | OUTPATIENT
Start: 2021-06-03 | End: 2021-07-08 | Stop reason: SDUPTHER

## 2021-06-28 DIAGNOSIS — G89.29 CHRONIC LOW BACK PAIN: ICD-10-CM

## 2021-06-28 DIAGNOSIS — M54.50 CHRONIC LOW BACK PAIN: ICD-10-CM

## 2021-06-28 DIAGNOSIS — F33.1 MODERATE EPISODE OF RECURRENT MAJOR DEPRESSIVE DISORDER (HCC): ICD-10-CM

## 2021-06-28 RX ORDER — ARIPIPRAZOLE 15 MG/1
15 TABLET ORAL DAILY
Qty: 30 TABLET | Refills: 1 | OUTPATIENT
Start: 2021-06-28

## 2021-06-28 RX ORDER — TRAMADOL HYDROCHLORIDE 50 MG/1
TABLET ORAL
Qty: 60 TABLET | Refills: 0 | OUTPATIENT
Start: 2021-06-28

## 2021-06-28 NOTE — TELEPHONE ENCOUNTER
Medication:  PDMP   06/03/2021  1  06/03/2021  LORAZEPAM 2 MG TABLET  120 0  30  BR SANDRA  [id number]  [facility] (0097)  0   Comm Ins  [place]    05/28/2021  1  05/28/2021  TRAMADOL HCL 50 MG TABLET  60 0  30  BR SANDRA  [id number]  [facility] (0097)  0  10 0 MME  Comm Ins  PA    04/27/2021  1  04/26/2021  TRAMADOL HCL 50 MG TABLET  60 0  30  BR SANDRA  [id number]  [facility] (0097)  0  10 0 MME  Comm Ins  PA    04/17/2021  1  04/13/2021  LORAZEPAM 2 MG TABLET  120 0  30  BR SANDRA  [id number]  [facility] (0097)  0   Comm Ins  PA    04/10/2021  1  03/04/2021  LORAZEPAM 2 MG TABLET  30 0  7  PA CRU  [id number]  [facility] (0097)  1   Comm Ins  PA    03/04/2021  1  03/04/2021  LORAZEPAM 2 MG TABLET  90 0  30  PA CRU  [id number]  [facility] (0097)  0   Comm Ins  PA      Active agreement on file -Yes

## 2021-06-29 ENCOUNTER — TELEPHONE (OUTPATIENT)
Dept: FAMILY MEDICINE CLINIC | Facility: CLINIC | Age: 46
End: 2021-06-29

## 2021-06-29 DIAGNOSIS — M54.50 CHRONIC LOW BACK PAIN: ICD-10-CM

## 2021-06-29 DIAGNOSIS — G89.29 CHRONIC LOW BACK PAIN: ICD-10-CM

## 2021-06-29 RX ORDER — TRAMADOL HYDROCHLORIDE 50 MG/1
TABLET ORAL
Qty: 60 TABLET | Refills: 0 | Status: SHIPPED | OUTPATIENT
Start: 2021-06-29 | End: 2021-08-03 | Stop reason: SDUPTHER

## 2021-06-29 NOTE — TELEPHONE ENCOUNTER
T/c from pt to check on status of med refill  Told pt that she needs an appt with the doctor  Pt would like to know if she can do a virtual visit, as she does not have money for gas and her legs are very painful right now due to her ms

## 2021-06-29 NOTE — TELEPHONE ENCOUNTER
Pt scheduled an in office appt for 07/08/2021 at 1 30 PM     Pt is asking if will you be refilling her earlier requested med  ? Please advice

## 2021-07-08 ENCOUNTER — OFFICE VISIT (OUTPATIENT)
Dept: FAMILY MEDICINE CLINIC | Facility: CLINIC | Age: 46
End: 2021-07-08
Payer: COMMERCIAL

## 2021-07-08 VITALS
HEART RATE: 63 BPM | WEIGHT: 94.6 LBS | DIASTOLIC BLOOD PRESSURE: 64 MMHG | OXYGEN SATURATION: 98 % | BODY MASS INDEX: 21.89 KG/M2 | HEIGHT: 55 IN | SYSTOLIC BLOOD PRESSURE: 102 MMHG

## 2021-07-08 DIAGNOSIS — G47.00 INSOMNIA, UNSPECIFIED TYPE: ICD-10-CM

## 2021-07-08 DIAGNOSIS — R23.2 HOT FLASHES: ICD-10-CM

## 2021-07-08 DIAGNOSIS — M54.50 CHRONIC LOW BACK PAIN, UNSPECIFIED BACK PAIN LATERALITY, UNSPECIFIED WHETHER SCIATICA PRESENT: Primary | ICD-10-CM

## 2021-07-08 DIAGNOSIS — R32 URINARY INCONTINENCE, UNSPECIFIED TYPE: ICD-10-CM

## 2021-07-08 DIAGNOSIS — F33.1 MODERATE EPISODE OF RECURRENT MAJOR DEPRESSIVE DISORDER (HCC): ICD-10-CM

## 2021-07-08 DIAGNOSIS — Z72.0 TOBACCO USE: ICD-10-CM

## 2021-07-08 DIAGNOSIS — G89.29 CHRONIC LOW BACK PAIN, UNSPECIFIED BACK PAIN LATERALITY, UNSPECIFIED WHETHER SCIATICA PRESENT: Primary | ICD-10-CM

## 2021-07-08 DIAGNOSIS — M54.16 LUMBAR RADICULOPATHY: ICD-10-CM

## 2021-07-08 DIAGNOSIS — F41.9 ANXIETY: ICD-10-CM

## 2021-07-08 PROBLEM — L81.9 CHANGE IN MULTIPLE PIGMENTED SKIN LESIONS: Status: RESOLVED | Noted: 2020-02-25 | Resolved: 2021-07-08

## 2021-07-08 PROCEDURE — 4004F PT TOBACCO SCREEN RCVD TLK: CPT | Performed by: FAMILY MEDICINE

## 2021-07-08 PROCEDURE — 99214 OFFICE O/P EST MOD 30 MIN: CPT | Performed by: FAMILY MEDICINE

## 2021-07-08 PROCEDURE — 3008F BODY MASS INDEX DOCD: CPT | Performed by: FAMILY MEDICINE

## 2021-07-08 RX ORDER — ARIPIPRAZOLE 15 MG/1
15 TABLET ORAL DAILY
Qty: 30 TABLET | Refills: 5 | Status: SHIPPED | OUTPATIENT
Start: 2021-07-08

## 2021-07-08 RX ORDER — GABAPENTIN 300 MG/1
300 CAPSULE ORAL 3 TIMES DAILY
Qty: 90 CAPSULE | Refills: 5 | Status: SHIPPED | OUTPATIENT
Start: 2021-07-08

## 2021-07-08 RX ORDER — OXYBUTYNIN CHLORIDE 5 MG/1
5 TABLET, EXTENDED RELEASE ORAL DAILY
Qty: 30 TABLET | Refills: 5 | Status: SHIPPED | OUTPATIENT
Start: 2021-07-08

## 2021-07-08 RX ORDER — LORAZEPAM 2 MG/1
2 TABLET ORAL EVERY 6 HOURS PRN
Qty: 120 TABLET | Refills: 0 | Status: SHIPPED | OUTPATIENT
Start: 2021-07-08 | End: 2021-08-17 | Stop reason: SDUPTHER

## 2021-07-08 NOTE — PROGRESS NOTES
Assessment/Plan:   return visit in 4 months  She is to get blood work done that was previously prescribed and we will call with results  Problem List Items Addressed This Visit        Other    Depression    Relevant Medications    nicotine polacrilex (NICORETTE) 4 mg gum    LORazepam (ATIVAN) 2 mg tablet    ARIPiprazole (ABILIFY) 15 mg tablet    Back pain, chronic - Primary    Tobacco use    Relevant Medications    nicotine polacrilex (NICORETTE) 4 mg gum    Anxiety       Continue continue Zoloft  200 mg daily         Relevant Medications    LORazepam (ATIVAN) 2 mg tablet    Insomnia      Continue trazodone 200 mg q h s  Urinary incontinence    Relevant Medications    oxybutynin (DITROPAN-XL) 5 mg 24 hr tablet      Other Visit Diagnoses     Hot flashes        Relevant Medications    gabapentin (NEURONTIN) 300 mg capsule    Lumbar radiculopathy        Relevant Orders    XR spine lumbar minimum 4 views non injury            Subjective:      Patient ID: Stephanie Morales is a 54 y o  female  Patient comes in for checkup  She complains of achiness in her legs  She wished to try to quit smoking she was not successful with nicotine patch  The following portions of the patient's history were reviewed and updated as appropriate:   Past Medical History:  She has no past medical history on file ,  _______________________________________________________________________  Medical Problems:  does not have any pertinent problems on file ,  _______________________________________________________________________  Past Surgical History:   has a past surgical history that includes Eye surgery  ,  _______________________________________________________________________  Family History:  family history includes Cancer in her father; Coronary artery disease in her mother; Heart attack in her mother; Mental illness in her father; Substance Abuse in her mother ,  _______________________________________________________________________  Social History:   reports that she has been smoking  She has been smoking about 0 50 packs per day  She has never used smokeless tobacco  She reports that she does not drink alcohol and does not use drugs  ,  _______________________________________________________________________  Allergies:  is allergic to sulfa antibiotics     _______________________________________________________________________  Current Outpatient Medications   Medication Sig Dispense Refill    ARIPiprazole (ABILIFY) 15 mg tablet Take 1 tablet (15 mg total) by mouth daily 30 tablet 5    cyclobenzaprine (FLEXERIL) 10 mg tablet Take 1 tablet (10 mg total) by mouth 3 (three) times a day as needed for muscle spasms 30 tablet 0    gabapentin (NEURONTIN) 300 mg capsule Take 1 capsule (300 mg total) by mouth 3 (three) times a day 90 capsule 5    LORazepam (ATIVAN) 2 mg tablet Take 1 tablet (2 mg total) by mouth every 6 (six) hours as needed for anxiety 120 tablet 0    oxybutynin (DITROPAN-XL) 5 mg 24 hr tablet Take 1 tablet (5 mg total) by mouth daily 30 tablet 5    sertraline (ZOLOFT) 100 mg tablet take 2 tablets by mouth daily 60 tablet 5    traMADol (ULTRAM) 50 mg tablet 2 daily for pain 60 tablet 0    traZODone (DESYREL) 100 mg tablet Take 2 tablets (200 mg total) by mouth daily at bedtime 60 tablet 5    nicotine polacrilex (NICORETTE) 4 mg gum Chew 1 each (4 mg total) as needed for smoking cessation 100 each 5     No current facility-administered medications for this visit      _______________________________________________________________________  Review of Systems   Constitutional: Negative  Respiratory: Negative  Cardiovascular: Negative  Musculoskeletal: Positive for back pain and gait problem           Objective:  Vitals:    07/08/21 1319   BP: 102/64   BP Location: Left arm   Patient Position: Sitting   Cuff Size: Adult   Pulse: 63   SpO2: 98% Weight: 42 9 kg (94 lb 9 6 oz)   Height: 4' 7" (1 397 m)     Body mass index is 21 99 kg/m²  Physical Exam  Constitutional:       Appearance: Normal appearance  She is well-developed  HENT:      Head: Normocephalic and atraumatic  Right Ear: External ear normal       Left Ear: External ear normal    Eyes:      Pupils: Pupils are equal, round, and reactive to light  Neck:      Thyroid: No thyromegaly  Cardiovascular:      Rate and Rhythm: Normal rate and regular rhythm  Heart sounds: Normal heart sounds  Pulmonary:      Effort: Pulmonary effort is normal       Breath sounds: Normal breath sounds  Abdominal:      Palpations: Abdomen is soft  Musculoskeletal:         General: Normal range of motion  Cervical back: Neck supple  Comments: Legs are nontender  Strength is 5/5 to hip flexion  Bilaterally  Straight leg raising worsens her pain  Lymphadenopathy:      Cervical: No cervical adenopathy  Skin:     General: Skin is warm and dry  Neurological:      Mental Status: She is alert and oriented to person, place, and time

## 2021-08-02 DIAGNOSIS — M54.50 CHRONIC LOW BACK PAIN: ICD-10-CM

## 2021-08-02 DIAGNOSIS — G89.29 CHRONIC LOW BACK PAIN: ICD-10-CM

## 2021-08-03 ENCOUNTER — TELEPHONE (OUTPATIENT)
Dept: FAMILY MEDICINE CLINIC | Facility: CLINIC | Age: 46
End: 2021-08-03

## 2021-08-03 RX ORDER — TRAMADOL HYDROCHLORIDE 50 MG/1
TABLET ORAL
Qty: 60 TABLET | Refills: 0 | Status: SHIPPED | OUTPATIENT
Start: 2021-08-03 | End: 2021-09-07 | Stop reason: SDUPTHER

## 2021-08-03 NOTE — TELEPHONE ENCOUNTER
Pt called to check on status of refill for Tramadol, pt states she is unable to reach [employer]  Pt informed rx was approved by Dr Gibbs  E-Prescribing Status: Receipt confirmed by pharmacy (8/3/2021  9:35 AM EDT)    Pt will drive to [facility]

## 2021-08-17 DIAGNOSIS — F41.9 ANXIETY: ICD-10-CM

## 2021-08-17 RX ORDER — LORAZEPAM 2 MG/1
2 TABLET ORAL EVERY 6 HOURS PRN
Qty: 120 TABLET | Refills: 0 | Status: SHIPPED | OUTPATIENT
Start: 2021-08-17 | End: 2021-09-27 | Stop reason: SDUPTHER

## 2021-09-07 DIAGNOSIS — M54.50 CHRONIC LOW BACK PAIN: ICD-10-CM

## 2021-09-07 DIAGNOSIS — G89.29 CHRONIC LOW BACK PAIN: ICD-10-CM

## 2021-09-07 RX ORDER — TRAMADOL HYDROCHLORIDE 50 MG/1
TABLET ORAL
Qty: 60 TABLET | Refills: 0 | Status: SHIPPED | OUTPATIENT
Start: 2021-09-07 | End: 2021-10-19 | Stop reason: SDUPTHER

## 2021-09-07 NOTE — TELEPHONE ENCOUNTER
08/17/2021  1  08/17/2021  LORAZEPAM 2 MG TABLET  120 0  30  BR SANDRA  [id number]  [facility] (0097)  0   [place]    08/03/2021  1  08/03/2021  TRAMADOL HCL 50 MG TABLET  60 0  30  [facility]DRA  [id number]  [facility] (0097)  0  10 0 MME  [place]    07/08/2021  1  07/08/2021  LORAZEPAM 2 MG TABLET  120 0  30  BR SANDRA  [id number]  [facility] (0097)  0   [place]

## 2021-09-27 DIAGNOSIS — F41.9 ANXIETY: ICD-10-CM

## 2021-09-27 RX ORDER — LORAZEPAM 2 MG/1
2 TABLET ORAL EVERY 6 HOURS PRN
Qty: 120 TABLET | Refills: 0 | Status: SHIPPED | OUTPATIENT
Start: 2021-09-27 | End: 2021-11-03 | Stop reason: SDUPTHER

## 2021-10-04 DIAGNOSIS — F41.9 ANXIETY: ICD-10-CM

## 2021-10-04 RX ORDER — SERTRALINE HYDROCHLORIDE 100 MG/1
TABLET, FILM COATED ORAL
Qty: 60 TABLET | Refills: 5 | Status: SHIPPED | OUTPATIENT
Start: 2021-10-04 | End: 2022-04-01

## 2021-10-19 DIAGNOSIS — M54.50 CHRONIC LOW BACK PAIN: ICD-10-CM

## 2021-10-19 DIAGNOSIS — G89.29 CHRONIC LOW BACK PAIN: ICD-10-CM

## 2021-10-19 RX ORDER — TRAMADOL HYDROCHLORIDE 50 MG/1
TABLET ORAL
Qty: 60 TABLET | Refills: 0 | Status: SHIPPED | OUTPATIENT
Start: 2021-10-19 | End: 2022-06-29 | Stop reason: SDUPTHER

## 2021-10-26 ENCOUNTER — OFFICE VISIT (OUTPATIENT)
Dept: FAMILY MEDICINE CLINIC | Facility: CLINIC | Age: 46
End: 2021-10-26
Payer: COMMERCIAL

## 2021-10-26 VITALS
TEMPERATURE: 97.4 F | DIASTOLIC BLOOD PRESSURE: 68 MMHG | BODY MASS INDEX: 21.29 KG/M2 | OXYGEN SATURATION: 97 % | HEART RATE: 74 BPM | WEIGHT: 92 LBS | SYSTOLIC BLOOD PRESSURE: 126 MMHG | HEIGHT: 55 IN

## 2021-10-26 DIAGNOSIS — R11.0 NAUSEA: ICD-10-CM

## 2021-10-26 DIAGNOSIS — R10.32 LEFT LOWER QUADRANT ABDOMINAL PAIN: Primary | ICD-10-CM

## 2021-10-26 PROCEDURE — 99213 OFFICE O/P EST LOW 20 MIN: CPT | Performed by: PHYSICIAN ASSISTANT

## 2021-10-26 RX ORDER — ONDANSETRON 4 MG/1
4 TABLET, FILM COATED ORAL EVERY 8 HOURS PRN
Qty: 20 TABLET | Refills: 0 | Status: SHIPPED | OUTPATIENT
Start: 2021-10-26 | End: 2021-11-03 | Stop reason: SDUPTHER

## 2021-10-27 ENCOUNTER — TELEPHONE (OUTPATIENT)
Dept: FAMILY MEDICINE CLINIC | Facility: CLINIC | Age: 46
End: 2021-10-27

## 2021-10-28 ENCOUNTER — TELEPHONE (OUTPATIENT)
Dept: FAMILY MEDICINE CLINIC | Facility: CLINIC | Age: 46
End: 2021-10-28

## 2021-10-29 ENCOUNTER — OFFICE VISIT (OUTPATIENT)
Dept: FAMILY MEDICINE CLINIC | Facility: CLINIC | Age: 46
End: 2021-10-29
Payer: COMMERCIAL

## 2021-10-29 VITALS
WEIGHT: 92.6 LBS | HEIGHT: 55 IN | SYSTOLIC BLOOD PRESSURE: 98 MMHG | BODY MASS INDEX: 21.43 KG/M2 | OXYGEN SATURATION: 96 % | HEART RATE: 71 BPM | TEMPERATURE: 98 F | RESPIRATION RATE: 14 BRPM | DIASTOLIC BLOOD PRESSURE: 70 MMHG

## 2021-10-29 DIAGNOSIS — R10.30 LOWER ABDOMINAL PAIN: Primary | ICD-10-CM

## 2021-10-29 PROCEDURE — 99213 OFFICE O/P EST LOW 20 MIN: CPT | Performed by: FAMILY MEDICINE

## 2021-10-29 RX ORDER — AMOXICILLIN AND CLAVULANATE POTASSIUM 875; 125 MG/1; MG/1
1 TABLET, FILM COATED ORAL EVERY 12 HOURS SCHEDULED
Qty: 20 TABLET | Refills: 0 | Status: SHIPPED | OUTPATIENT
Start: 2021-10-29 | End: 2021-11-08

## 2021-11-01 ENCOUNTER — TELEPHONE (OUTPATIENT)
Dept: FAMILY MEDICINE CLINIC | Facility: CLINIC | Age: 46
End: 2021-11-01

## 2021-11-01 DIAGNOSIS — R10.30 LOWER ABDOMINAL PAIN: Primary | ICD-10-CM

## 2021-11-01 RX ORDER — DOXYCYCLINE HYCLATE 100 MG/1
100 CAPSULE ORAL EVERY 12 HOURS SCHEDULED
Qty: 14 CAPSULE | Refills: 0 | Status: SHIPPED | OUTPATIENT
Start: 2021-11-01 | End: 2021-11-08

## 2021-11-03 DIAGNOSIS — F41.9 ANXIETY: ICD-10-CM

## 2021-11-03 DIAGNOSIS — G47.00 INSOMNIA, UNSPECIFIED TYPE: ICD-10-CM

## 2021-11-03 DIAGNOSIS — R11.0 NAUSEA: ICD-10-CM

## 2021-11-03 RX ORDER — LORAZEPAM 2 MG/1
2 TABLET ORAL EVERY 6 HOURS PRN
Qty: 120 TABLET | Refills: 0 | Status: SHIPPED | OUTPATIENT
Start: 2021-11-03 | End: 2021-12-10 | Stop reason: SDUPTHER

## 2021-11-03 RX ORDER — ONDANSETRON 4 MG/1
4 TABLET, FILM COATED ORAL EVERY 8 HOURS PRN
Qty: 20 TABLET | Refills: 0 | Status: SHIPPED | OUTPATIENT
Start: 2021-11-03 | End: 2022-08-08 | Stop reason: SDUPTHER

## 2021-11-03 RX ORDER — TRAZODONE HYDROCHLORIDE 100 MG/1
TABLET ORAL
Qty: 60 TABLET | Refills: 1 | Status: SHIPPED | OUTPATIENT
Start: 2021-11-03 | End: 2022-01-04

## 2021-11-05 ENCOUNTER — HOSPITAL ENCOUNTER (OUTPATIENT)
Dept: CT IMAGING | Facility: CLINIC | Age: 46
Discharge: HOME | End: 2021-11-05
Payer: COMMERCIAL

## 2021-11-05 DIAGNOSIS — R10.30 LOWER ABDOMINAL PAIN: ICD-10-CM

## 2021-11-05 PROCEDURE — G1004 CDSM NDSC: HCPCS

## 2021-11-05 PROCEDURE — 74176 CT ABD & PELVIS W/O CONTRAST: CPT

## 2021-11-09 ENCOUNTER — TELEPHONE (OUTPATIENT)
Dept: FAMILY MEDICINE CLINIC | Facility: CLINIC | Age: 46
End: 2021-11-09

## 2021-11-11 ENCOUNTER — TELEPHONE (OUTPATIENT)
Dept: FAMILY MEDICINE CLINIC | Facility: CLINIC | Age: 46
End: 2021-11-11

## 2021-11-11 DIAGNOSIS — R10.84 GENERALIZED ABDOMINAL PAIN: Primary | ICD-10-CM

## 2021-11-11 RX ORDER — DICYCLOMINE HCL 20 MG
20 TABLET ORAL EVERY 6 HOURS PRN
Qty: 30 TABLET | Refills: 0 | Status: SHIPPED | OUTPATIENT
Start: 2021-11-11 | End: 2021-11-29 | Stop reason: SDUPTHER

## 2021-11-29 DIAGNOSIS — R10.84 GENERALIZED ABDOMINAL PAIN: ICD-10-CM

## 2021-11-29 RX ORDER — DICYCLOMINE HCL 20 MG
20 TABLET ORAL EVERY 6 HOURS PRN
Qty: 30 TABLET | Refills: 0 | Status: SHIPPED | OUTPATIENT
Start: 2021-11-29 | End: 2021-12-17 | Stop reason: SDUPTHER

## 2021-12-10 DIAGNOSIS — F41.9 ANXIETY: ICD-10-CM

## 2021-12-10 RX ORDER — LORAZEPAM 2 MG/1
2 TABLET ORAL EVERY 6 HOURS PRN
Qty: 120 TABLET | Refills: 0 | Status: SHIPPED | OUTPATIENT
Start: 2021-12-10 | End: 2022-01-14 | Stop reason: SDUPTHER

## 2021-12-17 DIAGNOSIS — R10.84 GENERALIZED ABDOMINAL PAIN: ICD-10-CM

## 2021-12-17 RX ORDER — DICYCLOMINE HCL 20 MG
20 TABLET ORAL EVERY 6 HOURS PRN
Qty: 30 TABLET | Refills: 0 | Status: SHIPPED | OUTPATIENT
Start: 2021-12-17 | End: 2022-01-14 | Stop reason: SDUPTHER

## 2022-01-04 DIAGNOSIS — G47.00 INSOMNIA, UNSPECIFIED TYPE: ICD-10-CM

## 2022-01-04 RX ORDER — TRAZODONE HYDROCHLORIDE 100 MG/1
TABLET ORAL
Qty: 60 TABLET | Refills: 1 | Status: SHIPPED | OUTPATIENT
Start: 2022-01-04 | End: 2022-03-07

## 2022-01-14 DIAGNOSIS — F41.9 ANXIETY: ICD-10-CM

## 2022-01-14 DIAGNOSIS — R10.84 GENERALIZED ABDOMINAL PAIN: ICD-10-CM

## 2022-01-14 NOTE — TELEPHONE ENCOUNTER
Medication:  PDMP   12/11/2021  1  12/10/2021  LORAZEPAM 2 MG TABLET  120 0  30  [facility] SANDRA  [id number]  [facility] (0097)  0   [place]    11/04/2021  2  11/03/2021  LORAZEPAM 2 MG TABLET  120 0  30  BR SANDRA  [id number]  [facility] (0097)  0   [place]    09/28/2021  1  09/27/2021  LORAZEPAM 2 MG TABLET  120 0  30  [facility] SANDRA  [id number]  [facility] (0097)  0           Active agreement on file -Yes

## 2022-01-17 RX ORDER — DICYCLOMINE HCL 20 MG
20 TABLET ORAL EVERY 6 HOURS PRN
Qty: 30 TABLET | Refills: 0 | Status: SHIPPED | OUTPATIENT
Start: 2022-01-17 | End: 2022-02-11 | Stop reason: SDUPTHER

## 2022-01-17 RX ORDER — LORAZEPAM 2 MG/1
2 TABLET ORAL EVERY 6 HOURS PRN
Qty: 120 TABLET | Refills: 0 | Status: SHIPPED | OUTPATIENT
Start: 2022-01-17 | End: 2022-02-26 | Stop reason: SDUPTHER

## 2022-02-11 DIAGNOSIS — R10.84 GENERALIZED ABDOMINAL PAIN: ICD-10-CM

## 2022-02-11 RX ORDER — DICYCLOMINE HCL 20 MG
20 TABLET ORAL EVERY 6 HOURS PRN
Qty: 30 TABLET | Refills: 0 | Status: SHIPPED | OUTPATIENT
Start: 2022-02-11 | End: 2022-03-07 | Stop reason: SDUPTHER

## 2022-02-22 DIAGNOSIS — F41.9 ANXIETY: ICD-10-CM

## 2022-02-24 RX ORDER — LORAZEPAM 2 MG/1
2 TABLET ORAL EVERY 6 HOURS PRN
Qty: 120 TABLET | Refills: 0 | OUTPATIENT
Start: 2022-02-24

## 2022-02-25 NOTE — TELEPHONE ENCOUNTER
Patient called back asking if script was sent in  Informed pt that we are waiting to hear from Dr Miranda Letters to approve script since appt was made  Pt concerned this has taken so long, she is out of medication as of today

## 2022-02-26 DIAGNOSIS — F41.9 ANXIETY: ICD-10-CM

## 2022-02-26 NOTE — TELEPHONE ENCOUNTER
Dr Dax Simmons,    Pt is aware previous refill on medication was refused because she needs an appt  She scheduled for 03/29/21 with you  She called asking if you can authorize a refill to last until her appt  Please advise

## 2022-02-28 RX ORDER — LORAZEPAM 2 MG/1
2 TABLET ORAL EVERY 6 HOURS PRN
Qty: 120 TABLET | Refills: 0 | Status: SHIPPED | OUTPATIENT
Start: 2022-02-28 | End: 2022-04-04 | Stop reason: SDUPTHER

## 2022-03-06 DIAGNOSIS — G47.00 INSOMNIA, UNSPECIFIED TYPE: ICD-10-CM

## 2022-03-07 DIAGNOSIS — R10.84 GENERALIZED ABDOMINAL PAIN: ICD-10-CM

## 2022-03-07 RX ORDER — DICYCLOMINE HCL 20 MG
20 TABLET ORAL EVERY 6 HOURS PRN
Qty: 30 TABLET | Refills: 0 | Status: SHIPPED | OUTPATIENT
Start: 2022-03-07 | End: 2022-03-29 | Stop reason: SDUPTHER

## 2022-03-07 RX ORDER — TRAZODONE HYDROCHLORIDE 100 MG/1
TABLET ORAL
Qty: 60 TABLET | Refills: 1 | Status: SHIPPED | OUTPATIENT
Start: 2022-03-07 | End: 2022-05-08

## 2022-03-29 ENCOUNTER — OFFICE VISIT (OUTPATIENT)
Dept: FAMILY MEDICINE CLINIC | Facility: CLINIC | Age: 47
End: 2022-03-29
Payer: COMMERCIAL

## 2022-03-29 VITALS
BODY MASS INDEX: 20.69 KG/M2 | TEMPERATURE: 99 F | OXYGEN SATURATION: 98 % | HEART RATE: 66 BPM | WEIGHT: 89.4 LBS | DIASTOLIC BLOOD PRESSURE: 80 MMHG | SYSTOLIC BLOOD PRESSURE: 102 MMHG | HEIGHT: 55 IN

## 2022-03-29 DIAGNOSIS — R10.84 GENERALIZED ABDOMINAL PAIN: ICD-10-CM

## 2022-03-29 DIAGNOSIS — Z00.00 HEALTHCARE MAINTENANCE: ICD-10-CM

## 2022-03-29 DIAGNOSIS — G43.909 MIGRAINE WITHOUT STATUS MIGRAINOSUS, NOT INTRACTABLE, UNSPECIFIED MIGRAINE TYPE: ICD-10-CM

## 2022-03-29 DIAGNOSIS — K58.9 IRRITABLE BOWEL SYNDROME, UNSPECIFIED TYPE: Primary | ICD-10-CM

## 2022-03-29 DIAGNOSIS — M54.50 CHRONIC LOW BACK PAIN, UNSPECIFIED BACK PAIN LATERALITY, UNSPECIFIED WHETHER SCIATICA PRESENT: ICD-10-CM

## 2022-03-29 DIAGNOSIS — G89.29 CHRONIC LOW BACK PAIN, UNSPECIFIED BACK PAIN LATERALITY, UNSPECIFIED WHETHER SCIATICA PRESENT: ICD-10-CM

## 2022-03-29 DIAGNOSIS — Z11.59 NEED FOR HEPATITIS C SCREENING TEST: ICD-10-CM

## 2022-03-29 DIAGNOSIS — R32 URINARY INCONTINENCE, UNSPECIFIED TYPE: ICD-10-CM

## 2022-03-29 DIAGNOSIS — Z13.220 LIPID SCREENING: ICD-10-CM

## 2022-03-29 DIAGNOSIS — Z12.31 ENCOUNTER FOR SCREENING MAMMOGRAM FOR MALIGNANT NEOPLASM OF BREAST: ICD-10-CM

## 2022-03-29 DIAGNOSIS — G47.00 INSOMNIA, UNSPECIFIED TYPE: ICD-10-CM

## 2022-03-29 DIAGNOSIS — F33.1 MODERATE EPISODE OF RECURRENT MAJOR DEPRESSIVE DISORDER (HCC): ICD-10-CM

## 2022-03-29 DIAGNOSIS — R53.83 FATIGUE, UNSPECIFIED TYPE: ICD-10-CM

## 2022-03-29 PROCEDURE — 99396 PREV VISIT EST AGE 40-64: CPT | Performed by: FAMILY MEDICINE

## 2022-03-29 RX ORDER — DICYCLOMINE HCL 20 MG
20 TABLET ORAL EVERY 6 HOURS PRN
Qty: 30 TABLET | Refills: 5 | Status: SHIPPED | OUTPATIENT
Start: 2022-03-29 | End: 2022-06-29 | Stop reason: SDUPTHER

## 2022-03-29 NOTE — PROGRESS NOTES
Tobacco Cessation Counseling: Tobacco cessation counseling was provided  The patient is sincerely urged to quit consumption of tobacco  She is not ready to quit tobacco    Assessment/Plan:  Return visit in 4 months     Problem List Items Addressed This Visit        Digestive    Irritable bowel syndrome - Primary    Relevant Medications    dicyclomine (BENTYL) 20 mg tablet       Cardiovascular and Mediastinum    Migraine headache       Other    Depression    Back pain, chronic    Insomnia    Urinary incontinence    Healthcare maintenance      Other Visit Diagnoses     Generalized abdominal pain        Fatigue, unspecified type        Relevant Orders    TSH, 3rd generation with Free T4 reflex    CBC and differential    Comprehensive metabolic panel    Lipid screening        Relevant Orders    Lipid panel    Need for hepatitis C screening test        Relevant Orders    Hepatitis C antibody    Encounter for screening mammogram for malignant neoplasm of breast        Relevant Orders    Mammo screening bilateral w 3d & cad            Subjective:      Patient ID: Jignesh Talamantes is a 51 y o  female  Patient comes in for a checkup  She complains of a 1 month history of fatigue  She has irritable bowel syndrome and chronic abdominal pain she takes dicyclomine on regular basis  She is stable with her anxiety and depression on present medications  The following portions of the patient's history were reviewed and updated as appropriate:   Past Medical History:  She has no past medical history on file ,  _______________________________________________________________________  Medical Problems:  does not have any pertinent problems on file ,  _______________________________________________________________________  Past Surgical History:   has a past surgical history that includes Eye surgery  ,  _______________________________________________________________________  Family History:  family history includes Cancer in her father; Coronary artery disease in her mother; Heart attack in her mother; Mental illness in her father; Substance Abuse in her mother ,  _______________________________________________________________________  Social History:   reports that she has been smoking  She has been smoking about 0 50 packs per day  She has never used smokeless tobacco  She reports that she does not drink alcohol and does not use drugs  ,  _______________________________________________________________________  Allergies:  is allergic to sulfa antibiotics     _______________________________________________________________________  Current Outpatient Medications   Medication Sig Dispense Refill    ARIPiprazole (ABILIFY) 15 mg tablet Take 1 tablet (15 mg total) by mouth daily 30 tablet 5    cyclobenzaprine (FLEXERIL) 10 mg tablet Take 1 tablet (10 mg total) by mouth 3 (three) times a day as needed for muscle spasms 30 tablet 0    dicyclomine (BENTYL) 20 mg tablet Take 1 tablet (20 mg total) by mouth every 6 (six) hours as needed (Abdominal pain) 30 tablet 5    gabapentin (NEURONTIN) 300 mg capsule Take 1 capsule (300 mg total) by mouth 3 (three) times a day 90 capsule 5    LORazepam (ATIVAN) 2 mg tablet Take 1 tablet (2 mg total) by mouth every 6 (six) hours as needed for anxiety 120 tablet 0    ondansetron (ZOFRAN) 4 mg tablet Take 1 tablet (4 mg total) by mouth every 8 (eight) hours as needed for nausea or vomiting 20 tablet 0    oxybutynin (DITROPAN-XL) 5 mg 24 hr tablet Take 1 tablet (5 mg total) by mouth daily 30 tablet 5    sertraline (ZOLOFT) 100 mg tablet take 2 tablets by mouth daily 60 tablet 5    traMADol (ULTRAM) 50 mg tablet 2 daily for pain 60 tablet 0    traZODone (DESYREL) 100 mg tablet take 2 tablet by mouth at bedtime 60 tablet 1     No current facility-administered medications for this visit      _______________________________________________________________________  Review of Systems   Constitutional: Negative  Respiratory: Negative  Cardiovascular: Negative  Gastrointestinal: Positive for abdominal pain  Musculoskeletal: Positive for back pain  Psychiatric/Behavioral: Negative  Objective:  Vitals:    03/29/22 1446   BP: 102/80   BP Location: Left arm   Patient Position: Sitting   Cuff Size: Standard   Pulse: 66   Temp: 99 °F (37 2 °C)   TempSrc: Tympanic   SpO2: 98%   Weight: 40 6 kg (89 lb 6 4 oz)   Height: 4' 7" (1 397 m)     Body mass index is 20 78 kg/m²  Physical Exam  Constitutional:       Appearance: Normal appearance  She is well-developed  HENT:      Head: Normocephalic and atraumatic  Right Ear: Tympanic membrane, ear canal and external ear normal       Left Ear: Tympanic membrane, ear canal and external ear normal    Eyes:      Pupils: Pupils are equal, round, and reactive to light  Neck:      Thyroid: No thyromegaly  Cardiovascular:      Rate and Rhythm: Normal rate and regular rhythm  Heart sounds: Normal heart sounds  Pulmonary:      Effort: Pulmonary effort is normal       Breath sounds: Normal breath sounds  Abdominal:      General: Bowel sounds are normal       Palpations: Abdomen is soft  Tenderness: There is no abdominal tenderness  Musculoskeletal:         General: Normal range of motion  Cervical back: Neck supple  Lymphadenopathy:      Cervical: No cervical adenopathy  Skin:     General: Skin is warm and dry  Neurological:      Mental Status: She is alert     Psychiatric:         Mood and Affect: Mood normal          Behavior: Behavior normal

## 2022-04-01 DIAGNOSIS — F41.9 ANXIETY: ICD-10-CM

## 2022-04-01 RX ORDER — SERTRALINE HYDROCHLORIDE 100 MG/1
TABLET, FILM COATED ORAL
Qty: 60 TABLET | Refills: 5 | Status: SHIPPED | OUTPATIENT
Start: 2022-04-01

## 2022-04-04 DIAGNOSIS — F41.9 ANXIETY: ICD-10-CM

## 2022-04-04 RX ORDER — LORAZEPAM 2 MG/1
2 TABLET ORAL EVERY 6 HOURS PRN
Qty: 120 TABLET | Refills: 0 | Status: SHIPPED | OUTPATIENT
Start: 2022-04-04 | End: 2022-05-11 | Stop reason: SDUPTHER

## 2022-04-19 ENCOUNTER — TELEPHONE (OUTPATIENT)
Dept: FAMILY MEDICINE CLINIC | Facility: CLINIC | Age: 47
End: 2022-04-19

## 2022-04-19 DIAGNOSIS — R73.9 ELEVATED BLOOD SUGAR: ICD-10-CM

## 2022-04-19 DIAGNOSIS — E78.2 MIXED HYPERLIPIDEMIA: Primary | ICD-10-CM

## 2022-04-19 RX ORDER — ROSUVASTATIN CALCIUM 10 MG/1
10 TABLET, COATED ORAL DAILY
Qty: 30 TABLET | Refills: 5 | Status: SHIPPED | OUTPATIENT
Start: 2022-04-19

## 2022-04-19 NOTE — TELEPHONE ENCOUNTER
pt returning call regarding lab results - notified pt of Dr Chrissy Medrano' instructions and pt fully understood - is responsive to medication for cholesterol  Verified pharmacy on file is correct

## 2022-05-08 DIAGNOSIS — G47.00 INSOMNIA, UNSPECIFIED TYPE: ICD-10-CM

## 2022-05-08 RX ORDER — TRAZODONE HYDROCHLORIDE 100 MG/1
TABLET ORAL
Qty: 60 TABLET | Refills: 1 | Status: SHIPPED | OUTPATIENT
Start: 2022-05-08 | End: 2022-07-06

## 2022-05-11 DIAGNOSIS — F41.9 ANXIETY: ICD-10-CM

## 2022-05-12 RX ORDER — LORAZEPAM 2 MG/1
2 TABLET ORAL EVERY 6 HOURS PRN
Qty: 120 TABLET | Refills: 0 | Status: SHIPPED | OUTPATIENT
Start: 2022-05-12 | End: 2022-06-21 | Stop reason: SDUPTHER

## 2022-06-21 DIAGNOSIS — F41.9 ANXIETY: ICD-10-CM

## 2022-06-22 RX ORDER — LORAZEPAM 2 MG/1
2 TABLET ORAL EVERY 6 HOURS PRN
Qty: 120 TABLET | Refills: 0 | Status: SHIPPED | OUTPATIENT
Start: 2022-06-22 | End: 2022-07-29 | Stop reason: SDUPTHER

## 2022-06-29 ENCOUNTER — TELEPHONE (OUTPATIENT)
Dept: FAMILY MEDICINE CLINIC | Facility: CLINIC | Age: 47
End: 2022-06-29

## 2022-06-29 DIAGNOSIS — M54.50 CHRONIC LOW BACK PAIN: ICD-10-CM

## 2022-06-29 DIAGNOSIS — K58.9 IRRITABLE BOWEL SYNDROME, UNSPECIFIED TYPE: ICD-10-CM

## 2022-06-29 DIAGNOSIS — G89.29 CHRONIC LOW BACK PAIN: ICD-10-CM

## 2022-06-29 RX ORDER — DICYCLOMINE HCL 20 MG
20 TABLET ORAL EVERY 6 HOURS PRN
Qty: 30 TABLET | Refills: 5 | Status: SHIPPED | OUTPATIENT
Start: 2022-06-29

## 2022-06-29 RX ORDER — TRAMADOL HYDROCHLORIDE 50 MG/1
TABLET ORAL
Qty: 60 TABLET | Refills: 0 | Status: SHIPPED | OUTPATIENT
Start: 2022-06-29 | End: 2022-08-08 | Stop reason: SDUPTHER

## 2022-06-29 NOTE — TELEPHONE ENCOUNTER
Pt is also requesting a refill of "her stomach medication," related to her IBS  She does not remember the name of the medication, but confirmed for me that it is not Zofran

## 2022-07-06 DIAGNOSIS — G47.00 INSOMNIA, UNSPECIFIED TYPE: ICD-10-CM

## 2022-07-06 RX ORDER — TRAZODONE HYDROCHLORIDE 100 MG/1
TABLET ORAL
Qty: 60 TABLET | Refills: 1 | Status: SHIPPED | OUTPATIENT
Start: 2022-07-06 | End: 2022-09-07

## 2022-07-19 ENCOUNTER — VBI (OUTPATIENT)
Dept: ADMINISTRATIVE | Facility: OTHER | Age: 47
End: 2022-07-19

## 2022-07-29 DIAGNOSIS — F41.9 ANXIETY: ICD-10-CM

## 2022-07-29 RX ORDER — LORAZEPAM 2 MG/1
2 TABLET ORAL EVERY 6 HOURS PRN
Qty: 120 TABLET | Refills: 0 | Status: SHIPPED | OUTPATIENT
Start: 2022-07-29 | End: 2022-08-31 | Stop reason: SDUPTHER

## 2022-08-08 DIAGNOSIS — G89.29 CHRONIC LOW BACK PAIN: ICD-10-CM

## 2022-08-08 DIAGNOSIS — M54.50 CHRONIC LOW BACK PAIN: ICD-10-CM

## 2022-08-08 DIAGNOSIS — R11.0 NAUSEA: ICD-10-CM

## 2022-08-09 RX ORDER — TRAMADOL HYDROCHLORIDE 50 MG/1
TABLET ORAL
Qty: 60 TABLET | Refills: 0 | Status: SHIPPED | OUTPATIENT
Start: 2022-08-09 | End: 2022-09-09 | Stop reason: SDUPTHER

## 2022-08-09 RX ORDER — ONDANSETRON 4 MG/1
4 TABLET, FILM COATED ORAL EVERY 8 HOURS PRN
Qty: 20 TABLET | Refills: 0 | Status: SHIPPED | OUTPATIENT
Start: 2022-08-09 | End: 2022-09-09 | Stop reason: SDUPTHER

## 2022-08-31 DIAGNOSIS — F41.9 ANXIETY: ICD-10-CM

## 2022-09-01 RX ORDER — LORAZEPAM 2 MG/1
2 TABLET ORAL EVERY 6 HOURS PRN
Qty: 120 TABLET | Refills: 0 | Status: SHIPPED | OUTPATIENT
Start: 2022-09-01 | End: 2022-09-30 | Stop reason: SDUPTHER

## 2022-09-07 DIAGNOSIS — G47.00 INSOMNIA, UNSPECIFIED TYPE: ICD-10-CM

## 2022-09-07 RX ORDER — TRAZODONE HYDROCHLORIDE 100 MG/1
TABLET ORAL
Qty: 60 TABLET | Refills: 1 | Status: SHIPPED | OUTPATIENT
Start: 2022-09-07

## 2022-09-09 DIAGNOSIS — G89.29 CHRONIC LOW BACK PAIN: ICD-10-CM

## 2022-09-09 DIAGNOSIS — M54.50 CHRONIC LOW BACK PAIN: ICD-10-CM

## 2022-09-09 DIAGNOSIS — R11.0 NAUSEA: ICD-10-CM

## 2022-09-09 RX ORDER — ONDANSETRON 4 MG/1
4 TABLET, FILM COATED ORAL EVERY 8 HOURS PRN
Qty: 20 TABLET | Refills: 0 | Status: SHIPPED | OUTPATIENT
Start: 2022-09-09 | End: 2022-10-12 | Stop reason: SDUPTHER

## 2022-09-11 RX ORDER — TRAMADOL HYDROCHLORIDE 50 MG/1
TABLET ORAL
Qty: 60 TABLET | Refills: 0 | Status: SHIPPED | OUTPATIENT
Start: 2022-09-11 | End: 2022-10-18 | Stop reason: SDUPTHER

## 2022-09-19 ENCOUNTER — OFFICE VISIT (OUTPATIENT)
Dept: FAMILY MEDICINE CLINIC | Facility: CLINIC | Age: 47
End: 2022-09-19

## 2022-09-19 VITALS
BODY MASS INDEX: 19.67 KG/M2 | HEIGHT: 55 IN | TEMPERATURE: 97.8 F | HEART RATE: 81 BPM | DIASTOLIC BLOOD PRESSURE: 64 MMHG | OXYGEN SATURATION: 97 % | WEIGHT: 85 LBS | SYSTOLIC BLOOD PRESSURE: 110 MMHG

## 2022-09-19 DIAGNOSIS — E78.2 MIXED HYPERLIPIDEMIA: ICD-10-CM

## 2022-09-19 DIAGNOSIS — K58.9 IRRITABLE BOWEL SYNDROME, UNSPECIFIED TYPE: ICD-10-CM

## 2022-09-19 DIAGNOSIS — Z12.11 COLON CANCER SCREENING: ICD-10-CM

## 2022-09-19 DIAGNOSIS — R07.9 CHEST PAIN, UNSPECIFIED TYPE: ICD-10-CM

## 2022-09-19 DIAGNOSIS — F33.1 MODERATE EPISODE OF RECURRENT MAJOR DEPRESSIVE DISORDER (HCC): ICD-10-CM

## 2022-09-19 DIAGNOSIS — Z72.0 TOBACCO USE: ICD-10-CM

## 2022-09-19 DIAGNOSIS — G47.00 INSOMNIA, UNSPECIFIED TYPE: ICD-10-CM

## 2022-09-19 DIAGNOSIS — M54.50 CHRONIC LOW BACK PAIN, UNSPECIFIED BACK PAIN LATERALITY, UNSPECIFIED WHETHER SCIATICA PRESENT: ICD-10-CM

## 2022-09-19 DIAGNOSIS — G89.29 CHRONIC LOW BACK PAIN, UNSPECIFIED BACK PAIN LATERALITY, UNSPECIFIED WHETHER SCIATICA PRESENT: ICD-10-CM

## 2022-09-19 DIAGNOSIS — F41.9 ANXIETY: Primary | ICD-10-CM

## 2022-09-19 PROBLEM — K62.5 BLOOD PER RECTUM: Status: RESOLVED | Noted: 2018-05-01 | Resolved: 2022-09-19

## 2022-09-19 NOTE — ASSESSMENT & PLAN NOTE
Continue tramadol 50 mg, 2 daily for pain  She has uses for several years without problems  She is well aware of problems associated with chronic narcotic pain medication use

## 2022-09-19 NOTE — PROGRESS NOTES
Name: Deanne Du      : 1975      MRN: [id number]  Encounter Provider: Gerardo Garcia MD  Encounter Date: 2022   [place] [address] [address] [address]   Return visit in 4 months she is get fasting blood work done that was previously prescribed  1  Anxiety  Assessment & Plan:  Continue Ativan 2 mg as needed      2  Moderate episode of recurrent major depressive disorder (HCC)  Assessment & Plan:  Continue Zoloft 100 mg daily and Abilify 15 mg daily      3  Irritable bowel syndrome, unspecified type    4  Insomnia, unspecified type  Assessment & Plan:  Continue trazodone 100 mg daily      5  Mixed hyperlipidemia  Assessment & Plan:  Continue Crestor 10 mg daily      6  Tobacco use    7  Chest pain, unspecified type  -     Stress test only, exercise; Future; Expected date: 2022    8  Colon cancer screening  -     [facility]    9  Chronic low back pain, unspecified back pain laterality, unspecified whether sciatica present  Assessment & Plan:  Continue tramadol 50 mg, 2 daily for pain  She has uses for several years without problems  She is well aware of problems associated with chronic narcotic pain medication use  Subjective      Patient comes in for checkup  She is very upset  Her home was oxygen due to failure to pay back taxes she complains of intermittent chest pain with some shortness of breath  She has abdominal pain which she has sugar is related to her stress no as well as worsening depression  She complains of pain of the joints of her right hand  She was started on Crestor after last visit and blood work that showed elevated cholesterol  She is taking this without problems  Review of Systems   Constitutional: Negative  HENT: Negative  Eyes: Negative  Respiratory: Positive for shortness of breath  Cardiovascular: Positive for chest pain  Endocrine: Negative      Genitourinary: Positive for frequency  Musculoskeletal: Positive for arthralgias  Skin: Negative  Neurological: Negative  Hematological: Negative  Psychiatric/Behavioral: Positive for dysphoric mood  Current Outpatient Medications on File Prior to Visit   Medication Sig    ARIPiprazole (ABILIFY) 15 mg tablet Take 1 tablet (15 mg total) by mouth daily    cyclobenzaprine (FLEXERIL) 10 mg tablet Take 1 tablet (10 mg total) by mouth 3 (three) times a day as needed for muscle spasms    dicyclomine (BENTYL) 20 mg tablet Take 1 tablet (20 mg total) by mouth every 6 (six) hours as needed (Abdominal pain)    gabapentin (NEURONTIN) 300 mg capsule Take 1 capsule (300 mg total) by mouth 3 (three) times a day    LORazepam (ATIVAN) 2 mg tablet Take 1 tablet (2 mg total) by mouth every 6 (six) hours as needed for anxiety    ondansetron (ZOFRAN) 4 mg tablet Take 1 tablet (4 mg total) by mouth every 8 (eight) hours as needed for nausea or vomiting    oxybutynin (DITROPAN-XL) 5 mg 24 hr tablet Take 1 tablet (5 mg total) by mouth daily    rosuvastatin (CRESTOR) 10 MG tablet Take 1 tablet (10 mg total) by mouth daily    sertraline (ZOLOFT) 100 mg tablet take 2 tablets by mouth daily    traMADol (ULTRAM) 50 mg tablet 2 daily for pain    traZODone (DESYREL) 100 mg tablet take 2 tablet by mouth at bedtime       Objective     /64   Pulse 81   Temp 97 8 °F (36 6 °C)   Ht 4' 7" (1 397 m)   Wt 38 6 kg (85 lb)   SpO2 97%   BMI 19 76 kg/m²     Physical Exam  Constitutional:       Appearance: She is well-developed  HENT:      Head: Normocephalic and atraumatic  Right Ear: Tympanic membrane and external ear normal       Left Ear: Tympanic membrane and external ear normal    Eyes:      Pupils: Pupils are equal, round, and reactive to light  Neck:      Thyroid: No thyromegaly  Cardiovascular:      Rate and Rhythm: Normal rate and regular rhythm  Heart sounds: Normal heart sounds     Pulmonary:      Effort: Pulmonary effort is normal       Breath sounds: Normal breath sounds  Abdominal:      Palpations: Abdomen is soft  Musculoskeletal:         General: Normal range of motion  Cervical back: Neck supple  Lymphadenopathy:      Cervical: No cervical adenopathy  Skin:     General: Skin is warm and dry  Neurological:      Mental Status: She is alert     Psychiatric:      Comments: Tearful and depressed       Jazmyn Beck MD

## 2022-09-30 DIAGNOSIS — F41.9 ANXIETY: ICD-10-CM

## 2022-09-30 RX ORDER — LORAZEPAM 2 MG/1
2 TABLET ORAL EVERY 6 HOURS PRN
Qty: 120 TABLET | Refills: 0 | Status: CANCELLED | OUTPATIENT
Start: 2022-09-30

## 2022-10-03 DIAGNOSIS — F41.9 ANXIETY: ICD-10-CM

## 2022-10-03 RX ORDER — LORAZEPAM 2 MG/1
2 TABLET ORAL EVERY 6 HOURS PRN
Qty: 120 TABLET | Refills: 0 | Status: SHIPPED | OUTPATIENT
Start: 2022-10-03 | End: 2022-10-04

## 2022-10-04 RX ORDER — LORAZEPAM 2 MG/1
TABLET ORAL
Qty: 120 TABLET | Refills: 0 | Status: SHIPPED | OUTPATIENT
Start: 2022-10-04 | End: 2022-10-06 | Stop reason: SDUPTHER

## 2022-10-06 DIAGNOSIS — F41.9 ANXIETY: ICD-10-CM

## 2022-10-07 RX ORDER — LORAZEPAM 2 MG/1
2 TABLET ORAL EVERY 6 HOURS PRN
Qty: 120 TABLET | Refills: 0 | Status: SHIPPED | OUTPATIENT
Start: 2022-10-07

## 2022-10-12 DIAGNOSIS — R11.0 NAUSEA: ICD-10-CM

## 2022-10-12 PROBLEM — Z00.00 HEALTHCARE MAINTENANCE: Status: RESOLVED | Noted: 2020-02-25 | Resolved: 2022-10-12

## 2022-10-12 RX ORDER — ONDANSETRON 4 MG/1
4 TABLET, FILM COATED ORAL EVERY 8 HOURS PRN
Qty: 20 TABLET | Refills: 0 | Status: SHIPPED | OUTPATIENT
Start: 2022-10-12

## 2022-10-18 DIAGNOSIS — M54.50 CHRONIC LOW BACK PAIN: ICD-10-CM

## 2022-10-18 DIAGNOSIS — G89.29 CHRONIC LOW BACK PAIN: ICD-10-CM

## 2022-10-18 RX ORDER — TRAMADOL HYDROCHLORIDE 50 MG/1
TABLET ORAL
Qty: 60 TABLET | Refills: 0 | Status: SHIPPED | OUTPATIENT
Start: 2022-10-18

## 2022-10-19 DIAGNOSIS — F41.9 ANXIETY: ICD-10-CM

## 2022-10-19 RX ORDER — SERTRALINE HYDROCHLORIDE 100 MG/1
TABLET, FILM COATED ORAL
Qty: 60 TABLET | Refills: 5 | Status: SHIPPED | OUTPATIENT
Start: 2022-10-19

## 2022-10-21 ENCOUNTER — TELEPHONE (OUTPATIENT)
Dept: FAMILY MEDICINE CLINIC | Facility: CLINIC | Age: 47
End: 2022-10-21

## 2022-10-21 NOTE — TELEPHONE ENCOUNTER
T/c from pt -- states she has a UTI and is asking if Dr Karo Ortega could send in an [address] [place] [address] for her  Did inform pt she would most likely need and appointment, offered appt with a NP at [phone] today and pt is unable to come to office  Did inform pt Dr Karo Ortega is out of office today  Is Dr Karo Ortega able to send in ABX for pt?     Please advise

## 2022-10-23 DIAGNOSIS — N39.0 URINARY TRACT INFECTION WITHOUT HEMATURIA, SITE UNSPECIFIED: Primary | ICD-10-CM

## 2022-10-23 RX ORDER — NITROFURANTOIN 25; 75 MG/1; MG/1
100 CAPSULE ORAL 2 TIMES DAILY
Qty: 10 CAPSULE | Refills: 0 | Status: SHIPPED | OUTPATIENT
Start: 2022-10-23 | End: 2022-10-28

## 2022-11-02 DIAGNOSIS — F41.9 ANXIETY: ICD-10-CM

## 2022-11-03 RX ORDER — LORAZEPAM 2 MG/1
2 TABLET ORAL EVERY 6 HOURS PRN
Qty: 120 TABLET | Refills: 0 | Status: SHIPPED | OUTPATIENT
Start: 2022-11-03

## 2022-11-12 DIAGNOSIS — R32 URINARY INCONTINENCE, UNSPECIFIED TYPE: ICD-10-CM

## 2022-11-12 DIAGNOSIS — E78.2 MIXED HYPERLIPIDEMIA: ICD-10-CM

## 2022-11-12 DIAGNOSIS — G47.00 INSOMNIA, UNSPECIFIED TYPE: ICD-10-CM

## 2022-11-12 DIAGNOSIS — S16.1XXA STRAIN OF NECK MUSCLE, INITIAL ENCOUNTER: ICD-10-CM

## 2022-11-12 DIAGNOSIS — R23.2 HOT FLASHES: ICD-10-CM

## 2022-11-12 DIAGNOSIS — R11.0 NAUSEA: ICD-10-CM

## 2022-11-12 DIAGNOSIS — F33.1 MODERATE EPISODE OF RECURRENT MAJOR DEPRESSIVE DISORDER (HCC): ICD-10-CM

## 2022-11-12 RX ORDER — TRAZODONE HYDROCHLORIDE 100 MG/1
TABLET ORAL
Qty: 60 TABLET | Refills: 1 | Status: SHIPPED | OUTPATIENT
Start: 2022-11-12

## 2022-11-14 DIAGNOSIS — K58.9 IRRITABLE BOWEL SYNDROME, UNSPECIFIED TYPE: ICD-10-CM

## 2022-11-14 RX ORDER — CYCLOBENZAPRINE HCL 10 MG
10 TABLET ORAL 3 TIMES DAILY PRN
Qty: 30 TABLET | Refills: 0 | Status: SHIPPED | OUTPATIENT
Start: 2022-11-14

## 2022-11-14 RX ORDER — ONDANSETRON 4 MG/1
4 TABLET, FILM COATED ORAL EVERY 8 HOURS PRN
Qty: 20 TABLET | Refills: 0 | Status: SHIPPED | OUTPATIENT
Start: 2022-11-14 | End: 2022-11-21 | Stop reason: SDUPTHER

## 2022-11-14 RX ORDER — ARIPIPRAZOLE 15 MG/1
15 TABLET ORAL DAILY
Qty: 30 TABLET | Refills: 0 | Status: SHIPPED | OUTPATIENT
Start: 2022-11-14

## 2022-11-14 RX ORDER — DICYCLOMINE HCL 20 MG
TABLET ORAL
Qty: 30 TABLET | Refills: 5 | Status: SHIPPED | OUTPATIENT
Start: 2022-11-14

## 2022-11-14 RX ORDER — OXYBUTYNIN CHLORIDE 5 MG/1
5 TABLET, EXTENDED RELEASE ORAL DAILY
Qty: 30 TABLET | Refills: 0 | Status: SHIPPED | OUTPATIENT
Start: 2022-11-14

## 2022-11-14 RX ORDER — GABAPENTIN 300 MG/1
300 CAPSULE ORAL 3 TIMES DAILY
Qty: 90 CAPSULE | Refills: 0 | Status: SHIPPED | OUTPATIENT
Start: 2022-11-14

## 2022-11-14 RX ORDER — ROSUVASTATIN CALCIUM 10 MG/1
10 TABLET, COATED ORAL DAILY
Qty: 30 TABLET | Refills: 0 | Status: SHIPPED | OUTPATIENT
Start: 2022-11-14

## 2022-11-14 NOTE — TELEPHONE ENCOUNTER
Requested medication(s) are due for refill today: Yes  Patient has already received a courtesy refill: No  Other reason request has been forwarded to provider: Flagged for labs

## 2022-11-21 DIAGNOSIS — M54.50 CHRONIC LOW BACK PAIN: ICD-10-CM

## 2022-11-21 DIAGNOSIS — G89.29 CHRONIC LOW BACK PAIN: ICD-10-CM

## 2022-11-21 DIAGNOSIS — R11.0 NAUSEA: ICD-10-CM

## 2022-11-22 RX ORDER — ONDANSETRON 4 MG/1
4 TABLET, FILM COATED ORAL EVERY 8 HOURS PRN
Qty: 20 TABLET | Refills: 0 | Status: SHIPPED | OUTPATIENT
Start: 2022-11-22

## 2022-11-22 RX ORDER — TRAMADOL HYDROCHLORIDE 50 MG/1
TABLET ORAL
Qty: 60 TABLET | Refills: 0 | Status: SHIPPED | OUTPATIENT
Start: 2022-11-22

## 2022-12-12 DIAGNOSIS — R11.0 NAUSEA: ICD-10-CM

## 2022-12-12 DIAGNOSIS — F41.9 ANXIETY: ICD-10-CM

## 2022-12-12 RX ORDER — LORAZEPAM 2 MG/1
2 TABLET ORAL EVERY 6 HOURS PRN
Qty: 120 TABLET | Refills: 0 | Status: SHIPPED | OUTPATIENT
Start: 2022-12-12

## 2022-12-12 RX ORDER — ONDANSETRON 4 MG/1
4 TABLET, FILM COATED ORAL EVERY 8 HOURS PRN
Qty: 20 TABLET | Refills: 0 | Status: SHIPPED | OUTPATIENT
Start: 2022-12-12

## 2023-01-03 DIAGNOSIS — R11.0 NAUSEA: ICD-10-CM

## 2023-01-03 DIAGNOSIS — M54.50 CHRONIC LOW BACK PAIN: ICD-10-CM

## 2023-01-03 DIAGNOSIS — G89.29 CHRONIC LOW BACK PAIN: ICD-10-CM

## 2023-01-03 RX ORDER — ONDANSETRON 4 MG/1
4 TABLET, FILM COATED ORAL EVERY 8 HOURS PRN
Qty: 20 TABLET | Refills: 0 | Status: SHIPPED | OUTPATIENT
Start: 2023-01-03

## 2023-01-03 RX ORDER — TRAMADOL HYDROCHLORIDE 50 MG/1
TABLET ORAL
Qty: 60 TABLET | Refills: 0 | Status: SHIPPED | OUTPATIENT
Start: 2023-01-03

## 2023-01-03 NOTE — TELEPHONE ENCOUNTER
1 [id number] 12/12/2022 12/12/2022 LORazepam (Tablet) 120 0 30 2 MG NA [employer] [facility]  [place] [employer] 0 / 0 [place]    1 [id number] 11/22/2022 11/22/2022 traMADol HCL (Tablet) 60 0 30 50 MG 10 0 [employer] [facility]  [place] [employer] 0 / 0 [place]    1 [id number] 11/03/2022 11/03/2022 LORazepam (Tablet) 120 0 30 2 MG NA [facility]  [place] [employer] 0 / 0 [place]    1 [id number] 10/18/2022 10/18/2022 traMADol HCL (Tablet) 60 0 30 50 MG 10 0 [facility] [employer] [facility]  [place] [employer] 0 / 0 [place]    1 [id number] 10/06/2022 10/06/2022 Acetaminophen-codeine Phosphate (Tablet) 10 0 5 300 MG-30 MG 9 0 CRISTINA BLUM R[facility]  [place] [employer] 0 / 0 [place]    1 [id number] 10/04/2022 10/04/2022 LORazepam (Tablet) 120 0 30 2 MG NA Make [facility]  [place] [employer] 0 / 0 [place]

## 2023-01-13 DIAGNOSIS — F41.9 ANXIETY: ICD-10-CM

## 2023-01-13 DIAGNOSIS — G47.00 INSOMNIA, UNSPECIFIED TYPE: ICD-10-CM

## 2023-01-13 RX ORDER — TRAZODONE HYDROCHLORIDE 100 MG/1
200 TABLET ORAL
Qty: 60 TABLET | Refills: 1 | Status: SHIPPED | OUTPATIENT
Start: 2023-01-13

## 2023-01-15 RX ORDER — LORAZEPAM 2 MG/1
2 TABLET ORAL EVERY 6 HOURS PRN
Qty: 120 TABLET | Refills: 0 | Status: SHIPPED | OUTPATIENT
Start: 2023-01-15

## 2023-01-16 ENCOUNTER — TELEPHONE (OUTPATIENT)
Dept: FAMILY MEDICINE CLINIC | Facility: CLINIC | Age: 48
End: 2023-01-16

## 2023-01-27 DIAGNOSIS — R11.0 NAUSEA: ICD-10-CM

## 2023-01-27 RX ORDER — ONDANSETRON 4 MG/1
4 TABLET, FILM COATED ORAL EVERY 8 HOURS PRN
Qty: 20 TABLET | Refills: 0 | Status: SHIPPED | OUTPATIENT
Start: 2023-01-27

## 2023-02-02 DIAGNOSIS — G89.29 CHRONIC LOW BACK PAIN, UNSPECIFIED BACK PAIN LATERALITY, UNSPECIFIED WHETHER SCIATICA PRESENT: Primary | ICD-10-CM

## 2023-02-02 DIAGNOSIS — M54.50 CHRONIC LOW BACK PAIN, UNSPECIFIED BACK PAIN LATERALITY, UNSPECIFIED WHETHER SCIATICA PRESENT: Primary | ICD-10-CM

## 2023-02-02 DIAGNOSIS — G89.29 CHRONIC LOW BACK PAIN: ICD-10-CM

## 2023-02-02 DIAGNOSIS — M54.50 CHRONIC LOW BACK PAIN: ICD-10-CM

## 2023-02-02 RX ORDER — TRAMADOL HYDROCHLORIDE 50 MG/1
TABLET ORAL
Qty: 60 TABLET | Refills: 0 | Status: SHIPPED | OUTPATIENT
Start: 2023-02-02

## 2023-02-02 RX ORDER — NALOXONE HYDROCHLORIDE 4 MG/.1ML
SPRAY NASAL
Qty: 1 EACH | Refills: 1 | Status: SHIPPED | OUTPATIENT
Start: 2023-02-02

## 2023-02-02 NOTE — TELEPHONE ENCOUNTER
Medication:  [facility]    [id number] 01/16/2023  01/15/2023 LORazepam (Tablet)  120 0 30 2 MG NA WILLIAM [employer] [facility], [facility]  [employer] 0 / 0 [place]    1  [id number] 01/03/2023 01/03/2023 traMADol HCL (Tablet)  60 0 30 50 MG  10 0 [employer] [facility]  [employer] 0 / 0 [place]    2  [id number] 12/12/2022 12/12/2022 LORazepam (Tablet)  120 0 30 2 MG NA [employer]  [facility]         Active agreement on file -Yes    Please review in the absence of Dr Pura Araiza

## 2023-02-06 ENCOUNTER — OFFICE VISIT (OUTPATIENT)
Dept: FAMILY MEDICINE CLINIC | Facility: CLINIC | Age: 48
End: 2023-02-06

## 2023-02-06 VITALS
BODY MASS INDEX: 18.05 KG/M2 | DIASTOLIC BLOOD PRESSURE: 82 MMHG | HEART RATE: 109 BPM | WEIGHT: 78 LBS | TEMPERATURE: 98.1 F | SYSTOLIC BLOOD PRESSURE: 116 MMHG | HEIGHT: 55 IN | OXYGEN SATURATION: 97 %

## 2023-02-06 DIAGNOSIS — E78.2 MIXED HYPERLIPIDEMIA: ICD-10-CM

## 2023-02-06 DIAGNOSIS — R11.0 NAUSEA: ICD-10-CM

## 2023-02-06 DIAGNOSIS — M54.50 CHRONIC LOW BACK PAIN, UNSPECIFIED BACK PAIN LATERALITY, UNSPECIFIED WHETHER SCIATICA PRESENT: Primary | ICD-10-CM

## 2023-02-06 DIAGNOSIS — F33.1 MODERATE EPISODE OF RECURRENT MAJOR DEPRESSIVE DISORDER (HCC): ICD-10-CM

## 2023-02-06 DIAGNOSIS — K58.9 IRRITABLE BOWEL SYNDROME, UNSPECIFIED TYPE: ICD-10-CM

## 2023-02-06 DIAGNOSIS — G43.909 MIGRAINE WITHOUT STATUS MIGRAINOSUS, NOT INTRACTABLE, UNSPECIFIED MIGRAINE TYPE: ICD-10-CM

## 2023-02-06 DIAGNOSIS — G47.00 INSOMNIA, UNSPECIFIED TYPE: ICD-10-CM

## 2023-02-06 DIAGNOSIS — G89.29 CHRONIC LOW BACK PAIN, UNSPECIFIED BACK PAIN LATERALITY, UNSPECIFIED WHETHER SCIATICA PRESENT: Primary | ICD-10-CM

## 2023-02-06 DIAGNOSIS — F41.9 ANXIETY: ICD-10-CM

## 2023-02-06 DIAGNOSIS — R63.4 WEIGHT LOSS: ICD-10-CM

## 2023-02-06 DIAGNOSIS — Z72.0 TOBACCO USE: ICD-10-CM

## 2023-02-06 RX ORDER — ARIPIPRAZOLE 30 MG/1
30 TABLET ORAL DAILY
Qty: 30 TABLET | Refills: 3 | Status: SHIPPED | OUTPATIENT
Start: 2023-02-06

## 2023-02-06 RX ORDER — PREDNISONE 20 MG/1
20 TABLET ORAL AS NEEDED
COMMUNITY
Start: 2022-11-14

## 2023-02-06 NOTE — PROGRESS NOTES
Name: Erna Hill      : 1975      MRN: [id number]  Encounter Provider: Pallavi Ellis MD  Encounter Date: 2023   Encounter department: [facility] [address] [address]   Return visit in 6 weeks  We discussed psychiatric evaluation  We discussed her going on disability because of her chronic psychological problems  1  Chronic low back pain, unspecified back pain laterality, unspecified whether sciatica present  Assessment & Plan:  Continue tramadol 2  daily  2  Insomnia, unspecified type  Assessment & Plan:  Continue trazodone 200 mg nightly      3  Irritable bowel syndrome, unspecified type    4  Tobacco use    5  Weight loss  -     TSH, 3rd generation with Free T4 reflex; Future  -     CBC and differential; Future    6  Anxiety  Assessment & Plan:  Continue Ativan 2 mg which she is taking 3-4 times per day  7  Nausea    8  Moderate episode of recurrent major depressive disorder (HCC)  Assessment & Plan:  Increase Abilify to 30 mg daily  Continue Zoloft 200 mg daily  Orders:  -     ARIPiprazole (ABILIFY) 30 mg tablet; Take 1 tablet (30 mg total) by mouth daily    9  Migraine without status migrainosus, not intractable, unspecified migraine type    10  Mixed hyperlipidemia  Assessment & Plan:  Continue Crestor 10 mg daily    Orders:  -     Comprehensive metabolic panel; Future  -     Lipid panel; Future    BMI Counseling: Body mass index is 18 13 kg/m²  The BMI is below normal  Patient advised to gain weight  Rationale for BMI follow-up plan is due to patient being underweight  Subjective      Patient comes in with worsening depression  She is living with her boyfriend in the basement of his parents house  They have fights with each other on a regular basis and she finds this very upsetting  She has nausea all the time and has lost weight  Review of Systems   Constitutional: Negative  Respiratory: Negative  Cardiovascular: Negative  Gastrointestinal: Positive for nausea  Musculoskeletal: Positive for back pain  Psychiatric/Behavioral: Positive for dysphoric mood  The patient is nervous/anxious  Current Outpatient Medications on File Prior to Visit   Medication Sig   • cyclobenzaprine (FLEXERIL) 10 mg tablet Take 1 tablet (10 mg total) by mouth 3 (three) times a day as needed for muscle spasms   • dicyclomine (BENTYL) 20 mg tablet take 1 tablet by mouth every 6 hours if needed (ABDOMINAL PAIN)   • gabapentin (NEURONTIN) 300 mg capsule Take 1 capsule (300 mg total) by mouth 3 (three) times a day   • LORazepam (ATIVAN) 2 mg tablet Take 1 tablet (2 mg total) by mouth every 6 (six) hours as needed for anxiety   • ondansetron (ZOFRAN) 4 mg tablet Take 1 tablet (4 mg total) by mouth every 8 (eight) hours as needed for nausea or vomiting   • oxybutynin (DITROPAN-XL) 5 mg 24 hr tablet Take 1 tablet (5 mg total) by mouth daily   • rosuvastatin (CRESTOR) 10 MG tablet Take 1 tablet (10 mg total) by mouth daily   • sertraline (ZOLOFT) 100 mg tablet take 2 tablets by mouth daily   • traMADol (ULTRAM) 50 mg tablet 2 daily for pain   • traZODone (DESYREL) 100 mg tablet Take 2 tablets (200 mg total) by mouth daily at bedtime   • [DISCONTINUED] ARIPiprazole (ABILIFY) 15 mg tablet Take 1 tablet (15 mg total) by mouth daily   • naloxone (NARCAN) 4 mg/0 1 mL nasal spray Administer 1 spray into a nostril  If no response after 2-3 minutes, give another dose in the other nostril using a new spray  (Patient not taking: Reported on 2/6/2023)   • predniSONE 20 mg tablet Take 20 mg by mouth if needed       Objective     /82 (BP Location: Left arm, Patient Position: Sitting, Cuff Size: Standard)   Pulse (!) 109   Temp 98 1 °F (36 7 °C)   Ht 4' 7" (1 397 m)   Wt 35 4 kg (78 lb)   SpO2 97%   BMI 18 13 kg/m²     Physical Exam  Constitutional:       Appearance: Normal appearance  She is well-developed     HENT:      Head: Normocephalic and atraumatic  Right Ear: Tympanic membrane normal    Eyes:      Pupils: Pupils are equal, round, and reactive to light  Neck:      Thyroid: No thyromegaly  Cardiovascular:      Rate and Rhythm: Normal rate and regular rhythm  Heart sounds: Normal heart sounds  Pulmonary:      Effort: Pulmonary effort is normal       Breath sounds: Normal breath sounds  Abdominal:      Palpations: Abdomen is soft  There is no mass  Tenderness: There is no abdominal tenderness  Musculoskeletal:         General: Normal range of motion  Cervical back: Neck supple  Lymphadenopathy:      Cervical: No cervical adenopathy  Skin:     General: Skin is warm and dry  Neurological:      Mental Status: She is alert  Psychiatric:      Comments: Depressed and tearful         Enrique Carrington MD

## 2023-02-10 ENCOUNTER — VBI (OUTPATIENT)
Dept: ADMINISTRATIVE | Facility: OTHER | Age: 48
End: 2023-02-10

## 2023-02-14 ENCOUNTER — VBI (OUTPATIENT)
Dept: ADMINISTRATIVE | Facility: OTHER | Age: 48
End: 2023-02-14

## 2023-02-14 DIAGNOSIS — R11.0 NAUSEA: ICD-10-CM

## 2023-02-14 DIAGNOSIS — F41.9 ANXIETY: ICD-10-CM

## 2023-02-14 RX ORDER — ONDANSETRON 4 MG/1
4 TABLET, FILM COATED ORAL EVERY 8 HOURS PRN
Qty: 20 TABLET | Refills: 0 | Status: SHIPPED | OUTPATIENT
Start: 2023-02-14

## 2023-02-14 RX ORDER — LORAZEPAM 2 MG/1
2 TABLET ORAL EVERY 6 HOURS PRN
Qty: 120 TABLET | Refills: 0 | Status: SHIPPED | OUTPATIENT
Start: 2023-02-14

## 2023-02-28 DIAGNOSIS — G89.29 CHRONIC LOW BACK PAIN: ICD-10-CM

## 2023-02-28 DIAGNOSIS — M54.50 CHRONIC LOW BACK PAIN: ICD-10-CM

## 2023-02-28 DIAGNOSIS — R11.0 NAUSEA: ICD-10-CM

## 2023-02-28 RX ORDER — ONDANSETRON 4 MG/1
4 TABLET, FILM COATED ORAL EVERY 8 HOURS PRN
Qty: 20 TABLET | Refills: 0 | Status: SHIPPED | OUTPATIENT
Start: 2023-02-28

## 2023-02-28 RX ORDER — TRAMADOL HYDROCHLORIDE 50 MG/1
TABLET ORAL
Qty: 60 TABLET | Refills: 0 | Status: SHIPPED | OUTPATIENT
Start: 2023-02-28

## 2023-03-17 DIAGNOSIS — F33.1 MODERATE EPISODE OF RECURRENT MAJOR DEPRESSIVE DISORDER (HCC): ICD-10-CM

## 2023-03-17 DIAGNOSIS — G47.00 INSOMNIA, UNSPECIFIED TYPE: ICD-10-CM

## 2023-03-17 DIAGNOSIS — K58.9 IRRITABLE BOWEL SYNDROME, UNSPECIFIED TYPE: ICD-10-CM

## 2023-03-17 RX ORDER — DICYCLOMINE HCL 20 MG
TABLET ORAL
Qty: 30 TABLET | Refills: 5 | Status: SHIPPED | OUTPATIENT
Start: 2023-03-17

## 2023-03-19 RX ORDER — ARIPIPRAZOLE 30 MG/1
30 TABLET ORAL DAILY
Qty: 30 TABLET | Refills: 3 | Status: SHIPPED | OUTPATIENT
Start: 2023-03-19

## 2023-03-20 RX ORDER — TRAZODONE HYDROCHLORIDE 100 MG/1
200 TABLET ORAL
Qty: 60 TABLET | Refills: 0 | Status: SHIPPED | OUTPATIENT
Start: 2023-03-20

## 2023-03-30 DIAGNOSIS — F41.9 ANXIETY: ICD-10-CM

## 2023-03-30 DIAGNOSIS — R11.0 NAUSEA: ICD-10-CM

## 2023-03-31 RX ORDER — ONDANSETRON 4 MG/1
4 TABLET, FILM COATED ORAL EVERY 8 HOURS PRN
Qty: 20 TABLET | Refills: 0 | Status: SHIPPED | OUTPATIENT
Start: 2023-03-31

## 2023-03-31 NOTE — TELEPHONE ENCOUNTER
Medication:  PDMP   1  [id number] 03/07/2023 02/28/2023 traMADol HCL (Tablet)  60 0 30 50 MG  5 0 [employer] [facility]  [employer] 0 / 0 [place]    1  [id number] 02/15/2023  02/14/2023 LORazepam (Tablet)  120 0 30 2 MG NA [employer] [facility]  [employer] 0 / 0 [place]    1  [id number] 02/03/2023 02/02/2023 traMADol HCL (Tablet)  60 0 30 50 MG  5 0 [address] [place] [address],            Active agreement on file -Yes

## 2023-04-02 RX ORDER — LORAZEPAM 2 MG/1
2 TABLET ORAL EVERY 6 HOURS PRN
Qty: 120 TABLET | Refills: 0 | Status: SHIPPED | OUTPATIENT
Start: 2023-04-02

## 2023-04-04 DIAGNOSIS — F41.9 ANXIETY: ICD-10-CM

## 2023-04-04 RX ORDER — SERTRALINE HYDROCHLORIDE 100 MG/1
200 TABLET, FILM COATED ORAL DAILY
Qty: 60 TABLET | Refills: 5 | Status: SHIPPED | OUTPATIENT
Start: 2023-04-04

## 2023-04-26 DIAGNOSIS — R32 URINARY INCONTINENCE, UNSPECIFIED TYPE: ICD-10-CM

## 2023-04-26 DIAGNOSIS — G47.00 INSOMNIA, UNSPECIFIED TYPE: ICD-10-CM

## 2023-04-27 RX ORDER — TRAZODONE HYDROCHLORIDE 100 MG/1
200 TABLET ORAL
Qty: 60 TABLET | Refills: 0 | Status: SHIPPED | OUTPATIENT
Start: 2023-04-27

## 2023-04-27 RX ORDER — OXYBUTYNIN CHLORIDE 5 MG/1
5 TABLET, EXTENDED RELEASE ORAL DAILY
Qty: 30 TABLET | Refills: 0 | Status: SHIPPED | OUTPATIENT
Start: 2023-04-27

## 2023-05-03 DIAGNOSIS — F41.9 ANXIETY: ICD-10-CM

## 2023-05-03 RX ORDER — LORAZEPAM 2 MG/1
2 TABLET ORAL EVERY 6 HOURS PRN
Qty: 120 TABLET | Refills: 0 | Status: SHIPPED | OUTPATIENT
Start: 2023-05-03

## 2023-05-03 NOTE — TELEPHONE ENCOUNTER
Medication:  PDMP     1  [id number] 04/10/2023  04/10/2023 traMADol HCL (Tablet)  60 0 30 50 MG  10 0 WILLIAM BURNHAM  [facility]  Commercial Insurance 0 / 0 [place]    1  [id number] 04/03/2023 04/02/2023 LORazepam (Tablet)  90 0 30 2 MG NA WILLIAM BURNHAM  [facility]  [place]icaid 0 / 0 [place]    1  [id number] 03/07/2023 02/28/2023 traMADol HCL (Tablet)  60 0 30 50 MG  10 0 WILLIAM BURNHAM  [facility]  [employer] 0 / 0 [place]      Active agreement on file -Yes

## 2023-05-09 DIAGNOSIS — G89.29 CHRONIC LOW BACK PAIN: ICD-10-CM

## 2023-05-09 DIAGNOSIS — M54.50 CHRONIC LOW BACK PAIN: ICD-10-CM

## 2023-05-09 RX ORDER — TRAMADOL HYDROCHLORIDE 50 MG/1
TABLET ORAL
Qty: 60 TABLET | Refills: 0 | Status: SHIPPED | OUTPATIENT
Start: 2023-05-09

## 2023-05-09 NOTE — TELEPHONE ENCOUNTER
Medication:  PDMP   1  [id number] 05/04/2023 05/03/2023 LORazepam (Tablet)  90 0 30 2 MG NA WILLIAM CHACHA  [facility]  [place] 0 / 0 [place]    1  [id number] 04/10/2023  04/10/2023 traMADol HCL (Tablet)  60 0 30 50 MG  10 0 WILLIAM [facility]  Commercial Insurance 0 / 0 [place]    1  [id number] 04/03/2023 04/02/2023 LORazepam (Tablet)  90 0 30 2 MG NA WILLIAM [facility]  [place] 0 / 0 [place]          Active agreement on file -Yes Received call from Michelle Croft at [facility] with [employer] Complaint. Subjective: Caller states \"I fell 3 weeks ago and landed on left shoulder and is causing pain. The nurse with my insurance and wanted me check too for my circulation in my feet and hands\"     Current Symptoms: shoulder pain; fall    Onset: 3 weeks ago;     Associated Symptoms: NA    Pain Severity: 7/10; sharp with movement; intermittent    Temperature:  Denies fever    What has been tried: tylenol     LMP: NA Pregnant: NA    Recommended disposition: See in Office Today or Tomorrow    Care advice provided, patient verbalizes understanding; denies any other questions or concerns; instructed to call back for any new or worsening symptoms. Patient/Caller agrees with recommended disposition; writer provided warm transfer to [place] at [facility] for appointment scheduling    Attention Provider: Thank you for allowing me to participate in the care of your patient. The patient was connected to triage in response to information provided to the [facility]. Please do not respond through this encounter as the response is not directed to a shared pool.         Reason for Disposition   Injury interferes with work or school    Protocols used: Shoulder Injury-ADULT-[place]

## 2023-05-24 DIAGNOSIS — R32 URINARY INCONTINENCE, UNSPECIFIED TYPE: ICD-10-CM

## 2023-05-24 DIAGNOSIS — G47.00 INSOMNIA, UNSPECIFIED TYPE: ICD-10-CM

## 2023-05-24 RX ORDER — OXYBUTYNIN CHLORIDE 5 MG/1
5 TABLET, EXTENDED RELEASE ORAL DAILY
Qty: 30 TABLET | Refills: 0 | Status: SHIPPED | OUTPATIENT
Start: 2023-05-24

## 2023-05-24 RX ORDER — TRAZODONE HYDROCHLORIDE 100 MG/1
200 TABLET ORAL
Qty: 60 TABLET | Refills: 0 | Status: SHIPPED | OUTPATIENT
Start: 2023-05-24

## 2023-06-01 DIAGNOSIS — F41.9 ANXIETY: ICD-10-CM

## 2023-06-01 RX ORDER — LORAZEPAM 2 MG/1
2 TABLET ORAL EVERY 6 HOURS PRN
Qty: 120 TABLET | Refills: 0 | Status: SHIPPED | OUTPATIENT
Start: 2023-06-01

## 2023-06-05 DIAGNOSIS — F33.1 MODERATE EPISODE OF RECURRENT MAJOR DEPRESSIVE DISORDER (HCC): ICD-10-CM

## 2023-06-06 RX ORDER — ARIPIPRAZOLE 30 MG/1
30 TABLET ORAL DAILY
Qty: 30 TABLET | Refills: 3 | Status: SHIPPED | OUTPATIENT
Start: 2023-06-06

## 2023-06-11 DIAGNOSIS — F41.9 ANXIETY: ICD-10-CM

## 2023-06-11 DIAGNOSIS — G89.29 CHRONIC LOW BACK PAIN: ICD-10-CM

## 2023-06-11 DIAGNOSIS — M54.50 CHRONIC LOW BACK PAIN: ICD-10-CM

## 2023-06-12 RX ORDER — TRAMADOL HYDROCHLORIDE 50 MG/1
TABLET ORAL
Qty: 60 TABLET | Refills: 0 | Status: SHIPPED | OUTPATIENT
Start: 2023-06-12

## 2023-06-12 RX ORDER — TRAMADOL HYDROCHLORIDE 50 MG/1
TABLET ORAL
Qty: 60 TABLET | Refills: 0 | OUTPATIENT
Start: 2023-06-12

## 2023-06-12 RX ORDER — LORAZEPAM 2 MG/1
2 TABLET ORAL EVERY 6 HOURS PRN
Qty: 120 TABLET | Refills: 0 | Status: SHIPPED | OUTPATIENT
Start: 2023-06-12

## 2023-06-12 NOTE — TELEPHONE ENCOUNTER
Medication:  PDMP     [id number] 05/30/2023 05/03/2023 LORazepam (Tablet) 30 0 10 2 MG NA WILLIAM BURNHAM [facility] [place] 1 / 0 [place]     1 [id number] 05/09/2023 05/09/2023 traMADol HCL (Tablet) 60 0 30 50 MG 10 0 WILLIAM CHACHA [facility] Commercial Insurance 0 / 0 [place]    1 [id number] 05/04/2023 05/03/2023 LORazepam (Tablet) 90 0 30 2 MG NA WILLIAM CHACHA [facility] [place] 0 / 0 [place]          Active agreement on file -Yes

## 2023-06-12 NOTE — TELEPHONE ENCOUNTER
Notified pt  And scheduled appt for 6/19 - pt requesting enough meds be sent in until the appt - she has only 2 left  Please advise

## 2023-06-19 ENCOUNTER — OFFICE VISIT (OUTPATIENT)
Dept: FAMILY MEDICINE CLINIC | Facility: CLINIC | Age: 48
End: 2023-06-19
Payer: COMMERCIAL

## 2023-06-19 VITALS
BODY MASS INDEX: 17.87 KG/M2 | HEART RATE: 59 BPM | HEIGHT: 55 IN | TEMPERATURE: 98.5 F | SYSTOLIC BLOOD PRESSURE: 122 MMHG | WEIGHT: 77.2 LBS | OXYGEN SATURATION: 98 % | DIASTOLIC BLOOD PRESSURE: 70 MMHG

## 2023-06-19 DIAGNOSIS — F41.9 ANXIETY: ICD-10-CM

## 2023-06-19 DIAGNOSIS — Z12.31 ENCOUNTER FOR SCREENING MAMMOGRAM FOR MALIGNANT NEOPLASM OF BREAST: ICD-10-CM

## 2023-06-19 DIAGNOSIS — M54.50 CHRONIC LOW BACK PAIN, UNSPECIFIED BACK PAIN LATERALITY, UNSPECIFIED WHETHER SCIATICA PRESENT: ICD-10-CM

## 2023-06-19 DIAGNOSIS — G89.29 CHRONIC LOW BACK PAIN, UNSPECIFIED BACK PAIN LATERALITY, UNSPECIFIED WHETHER SCIATICA PRESENT: ICD-10-CM

## 2023-06-19 DIAGNOSIS — Z72.0 TOBACCO USE: ICD-10-CM

## 2023-06-19 DIAGNOSIS — Z00.00 HEALTH MAINTENANCE EXAMINATION: Primary | ICD-10-CM

## 2023-06-19 DIAGNOSIS — F33.1 MODERATE EPISODE OF RECURRENT MAJOR DEPRESSIVE DISORDER (HCC): ICD-10-CM

## 2023-06-19 DIAGNOSIS — E78.2 MIXED HYPERLIPIDEMIA: ICD-10-CM

## 2023-06-19 PROBLEM — M62.830 SPASM OF THORACIC BACK MUSCLE: Status: RESOLVED | Noted: 2020-02-25 | Resolved: 2023-06-19

## 2023-06-19 PROCEDURE — 99396 PREV VISIT EST AGE 40-64: CPT | Performed by: FAMILY MEDICINE

## 2023-06-19 RX ORDER — BENZONATATE 200 MG/1
CAPSULE ORAL
COMMUNITY
Start: 2023-05-19

## 2023-06-19 NOTE — PROGRESS NOTES
Name: Noah Martinez      : 1975      MRN: [id number]  Encounter Provider: Tra Ruiz MD  Encounter Date: 2023   Encounter department: [facility] [address] [address]   Return visit in 4 months  She is to do blood work done as previously prescribed and we will call with results  1  Health maintenance examination    2  Mixed hyperlipidemia  Assessment & Plan:  Continue Crestor 10 mg daily      3  Moderate episode of recurrent major depressive disorder ([address] )    4  Tobacco use    5  Anxiety  Assessment & Plan:  Continue Ativan 2 mg every 6 hours  6  Encounter for screening mammogram for malignant neoplasm of breast  -     Mammo screening bilateral w 3d & cad; Future; Expected date: 2023      Tobacco Cessation Counseling: Tobacco cessation counseling was provided  The patient is sincerely urged to quit consumption of tobacco  She is not ready to quit tobacco          Subjective      Patient comes in for checkup  She takes tramadol twice a day for chronic low back pain  She describes her pain as 7/10  She has opioid agreement on file and we are getting urine drug testing today  Her anxiety and depression are doing much better since increasing Abilify from last visit  Review of Systems   Constitutional: Negative  Cardiovascular: Negative  Musculoskeletal: Positive for back pain  Psychiatric/Behavioral: Positive for dysphoric mood and sleep disturbance  The patient is nervous/anxious          Current Outpatient Medications on File Prior to Visit   Medication Sig   • ARIPiprazole (ABILIFY) 30 mg tablet TAKE 1 TABLET (30 MG TOTAL) BY MOUTH DAILY   • benzonatate (TESSALON) 200 MG capsule    • cyclobenzaprine (FLEXERIL) 10 mg tablet Take 1 tablet (10 mg total) by mouth 3 (three) times a day as needed for muscle spasms   • dicyclomine (BENTYL) 20 mg tablet TAKE (1) TABLET BY MOUTH EVERY 6 HOURS AS NEEDED (ABDOMINAL PAIN)   • "gabapentin (NEURONTIN) 300 mg capsule Take 1 capsule (300 mg total) by mouth 3 (three) times a day   • LORazepam (ATIVAN) 2 mg tablet Take 1 tablet (2 mg total) by mouth every 6 (six) hours as needed for anxiety   • naloxone (NARCAN) 4 mg/0 1 mL nasal spray Administer 1 spray into a nostril  If no response after 2-3 minutes, give another dose in the other nostril using a new spray  • ondansetron (ZOFRAN) 4 mg tablet Take 1 tablet (4 mg total) by mouth every 8 (eight) hours as needed for nausea or vomiting   • oxybutynin (DITROPAN-XL) 5 mg 24 hr tablet TAKE 1 TABLET (5 MG TOTAL) BY MOUTH DAILY   • predniSONE 20 mg tablet Take 20 mg by mouth if needed   • rosuvastatin (CRESTOR) 10 MG tablet Take 1 tablet (10 mg total) by mouth daily   • sertraline (ZOLOFT) 100 mg tablet Take 2 tablets (200 mg total) by mouth daily   • traMADol (ULTRAM) 50 mg tablet 2 daily for pain   • traZODone (DESYREL) 100 mg tablet Take 2 tablets (200 mg total) by mouth daily at bedtime       Objective     /70 (BP Location: Left arm, Patient Position: Sitting, Cuff Size: Standard)   Pulse 59   Temp 98 5 °F (36 9 °C) (Tympanic)   Ht 4' 7\" (1 397 m)   Wt 35 kg (77 lb 3 2 oz)   SpO2 98%   BMI 17 94 kg/m²     Physical Exam  Constitutional:       Appearance: Normal appearance  She is well-developed  HENT:      Head: Normocephalic and atraumatic  Eyes:      Pupils: Pupils are equal, round, and reactive to light  Neck:      Thyroid: No thyromegaly  Cardiovascular:      Rate and Rhythm: Normal rate and regular rhythm  Heart sounds: Normal heart sounds  Pulmonary:      Effort: Pulmonary effort is normal       Breath sounds: Normal breath sounds  Musculoskeletal:      Cervical back: Neck supple  Comments: Midline low back tenderness  No paraspinal muscle tenderness  Straight leg raising is negative laterally  Lymphadenopathy:      Cervical: No cervical adenopathy  Skin:     General: Skin is warm and dry   " Neurological:      Mental Status: She is alert     Psychiatric:         Mood and Affect: Mood normal          Behavior: Behavior normal        Анна Cadet MD

## 2023-06-20 DIAGNOSIS — R32 URINARY INCONTINENCE, UNSPECIFIED TYPE: ICD-10-CM

## 2023-06-20 RX ORDER — OXYBUTYNIN CHLORIDE 5 MG/1
5 TABLET, EXTENDED RELEASE ORAL DAILY
Qty: 30 TABLET | Refills: 0 | Status: SHIPPED | OUTPATIENT
Start: 2023-06-20

## 2023-06-22 LAB
6MAM UR QL CFM: NEGATIVE NG/ML
7AMINOCLONAZEPAM UR QL CFM: NEGATIVE NG/ML
A-OH ALPRAZ UR QL CFM: NEGATIVE NG/ML
ACCEPTABLE CREAT UR QL: NORMAL MG/DL
ACCEPTIBLE SP GR UR QL: NORMAL
AMPHET UR QL CFM: NEGATIVE NG/ML
BUPRENORPHINE UR QL CFM: NEGATIVE NG/ML
BUTALBITAL UR QL CFM: NEGATIVE NG/ML
BZE UR QL CFM: NEGATIVE NG/ML
CODEINE UR QL CFM: NEGATIVE NG/ML
EDDP UR QL CFM: NEGATIVE NG/ML
ETHYL GLUCURONIDE UR QL CFM: NEGATIVE NG/ML
ETHYL SULFATE UR QL SCN: NEGATIVE NG/ML
EUTYLONE UR QL: NEGATIVE NG/ML
FENTANYL UR QL CFM: NEGATIVE NG/ML
GLIADIN IGG SER IA-ACNC: NEGATIVE NG/ML
HYDROCODONE UR QL CFM: NEGATIVE NG/ML
HYDROMORPHONE UR QL CFM: NEGATIVE NG/ML
LORAZEPAM UR QL CFM: NORMAL NG/ML
ME-PHENIDATE UR QL CFM: NEGATIVE NG/ML
MEPERIDINE UR QL CFM: NEGATIVE NG/ML
METHADONE UR QL CFM: NEGATIVE NG/ML
METHAMPHET UR QL CFM: NEGATIVE NG/ML
MORPHINE UR QL CFM: ABNORMAL NG/ML
NALTREXONE UR QL CFM: NEGATIVE NG/ML
NITRITE UR QL: NORMAL UG/ML
NORBUPRENORPHINE UR QL CFM: NEGATIVE NG/ML
NORDIAZEPAM UR QL CFM: NEGATIVE NG/ML
NORFENTANYL UR QL CFM: NEGATIVE NG/ML
NORHYDROCODONE UR QL CFM: NEGATIVE NG/ML
NORMEPERIDINE UR QL CFM: NEGATIVE NG/ML
NOROXYCODONE UR QL CFM: NEGATIVE NG/ML
OXAZEPAM UR QL CFM: NEGATIVE NG/ML
OXYCODONE UR QL CFM: NEGATIVE NG/ML
OXYMORPHONE UR QL CFM: NEGATIVE NG/ML
PARA-FLUOROFENTANYL QUANTIFICATION: NORMAL NG/ML
PHENOBARB UR QL CFM: NEGATIVE NG/ML
RESULT ALL_PRESCRIBED MEDS AND SPECIAL INSTRUCTIONS: NORMAL
SECOBARBITAL UR QL CFM: NEGATIVE NG/ML
SL AMB 4-ANPP QUANTIFICATION: NORMAL NG/ML
SL AMB 5F-ADB-M7 METABOLITE QUANTIFICATION: NEGATIVE NG/ML
SL AMB 7-OH-MITRAGYNINE (KRATOM ALKALOID) QUANTIFICATION: NEGATIVE NG/ML
SL AMB AB-FUBINACA-M3 METABOLITE QUANTIFICATION: NEGATIVE NG/ML
SL AMB ACETYL FENTANYL QUANTIFICATION: NORMAL NG/ML
SL AMB ACETYL NORFENTANYL QUANTIFICATION: NORMAL NG/ML
SL AMB ACRYL FENTANYL QUANTIFICATION: NORMAL NG/ML
SL AMB CARFENTANIL QUANTIFICATION: NORMAL NG/ML
SL AMB CTHC (MARIJUANA METABOLITE) QUANTIFICATION: ABNORMAL NG/ML
SL AMB DEXTRORPHAN (DEXTROMETHORPHAN METABOLITE) QUANT: NEGATIVE NG/ML
SL AMB GABAPENTIN QUANTIFICATION: ABNORMAL
SL AMB JWH018 METABOLITE QUANTIFICATION: NEGATIVE NG/ML
SL AMB JWH073 METABOLITE QUANTIFICATION: NEGATIVE NG/ML
SL AMB MDMB-FUBINACA-M1 METABOLITE QUANTIFICATION: NEGATIVE NG/ML
SL AMB METHYLONE QUANTIFICATION: NEGATIVE NG/ML
SL AMB N-DESMETHYL-TRAMADOL QUANTIFICATION: NORMAL NG/ML
SL AMB PHENTERMINE QUANTIFICATION: NEGATIVE NG/ML
SL AMB PREGABALIN QUANTIFICATION: NEGATIVE
SL AMB RCS4 METABOLITE QUANTIFICATION: NEGATIVE NG/ML
SL AMB RITALINIC ACID QUANTIFICATION: NEGATIVE NG/ML
SMOOTH MUSCLE AB TITR SER IF: NEGATIVE NG/ML
SPECIMEN DRAWN SERPL: NEGATIVE NG/ML
SPECIMEN PH ACCEPTABLE UR: NORMAL
TAPENTADOL UR QL CFM: NEGATIVE NG/ML
TEMAZEPAM UR QL CFM: NEGATIVE NG/ML
TRAMADOL UR QL CFM: NORMAL NG/ML
URATE/CREAT 24H UR: NORMAL NG/ML

## 2023-06-29 ENCOUNTER — VBI (OUTPATIENT)
Dept: ADMINISTRATIVE | Facility: OTHER | Age: 48
End: 2023-06-29

## 2023-07-07 DIAGNOSIS — K58.9 IRRITABLE BOWEL SYNDROME, UNSPECIFIED TYPE: ICD-10-CM

## 2023-07-07 RX ORDER — DICYCLOMINE HCL 20 MG
TABLET ORAL
Qty: 30 TABLET | Refills: 5 | Status: SHIPPED | OUTPATIENT
Start: 2023-07-07

## 2023-07-11 DIAGNOSIS — G47.00 INSOMNIA, UNSPECIFIED TYPE: ICD-10-CM

## 2023-07-11 RX ORDER — TRAZODONE HYDROCHLORIDE 100 MG/1
200 TABLET ORAL
Qty: 60 TABLET | Refills: 0 | Status: SHIPPED | OUTPATIENT
Start: 2023-07-11

## 2023-07-12 DIAGNOSIS — F41.9 ANXIETY: ICD-10-CM

## 2023-07-12 NOTE — TELEPHONE ENCOUNTER
Medication:  PDMP   [id number] 06/12/2023 06/12/2023 traMADol HCL (Tablet) 60.0 30 50 MG 10.0 WILLIAM [facility] Commercial [place] 0 / 0 [place]     1 [id number] 06/12/2023 06/01/2023 LORazepam (Tablet) 90.0 30 2 MG NA [facility] Medicaid 0 / 0 [place]    1 [id number] 05/30/2023 05/03/2023 LORazepam (Tablet) 30.0 10 2 MG NA [facility] Medicaid 1 / 0 [place]    1 [id number] 05/09/2023 05/09/2023 traMADol HCL (Tablet) 60.0 30 50 MG 10.0 [facility] Commercial Insurance 0 / 0 [place]        Active agreement on file -Yes

## 2023-07-13 RX ORDER — LORAZEPAM 2 MG/1
2 TABLET ORAL EVERY 6 HOURS PRN
Qty: 120 TABLET | Refills: 0 | Status: SHIPPED | OUTPATIENT
Start: 2023-07-13

## 2023-07-17 ENCOUNTER — TELEPHONE (OUTPATIENT)
Dept: FAMILY MEDICINE CLINIC | Facility: CLINIC | Age: 48
End: 2023-07-17

## 2023-07-26 DIAGNOSIS — R32 URINARY INCONTINENCE, UNSPECIFIED TYPE: ICD-10-CM

## 2023-07-26 RX ORDER — OXYBUTYNIN CHLORIDE 5 MG/1
5 TABLET, EXTENDED RELEASE ORAL DAILY
Qty: 30 TABLET | Refills: 0 | Status: SHIPPED | OUTPATIENT
Start: 2023-07-26

## 2023-07-29 DIAGNOSIS — E78.2 MIXED HYPERLIPIDEMIA: ICD-10-CM

## 2023-07-29 RX ORDER — ROSUVASTATIN CALCIUM 10 MG/1
10 TABLET, COATED ORAL DAILY
Qty: 30 TABLET | Refills: 0 | Status: SHIPPED | OUTPATIENT
Start: 2023-07-29

## 2023-08-02 DIAGNOSIS — K58.9 IRRITABLE BOWEL SYNDROME, UNSPECIFIED TYPE: ICD-10-CM

## 2023-08-02 RX ORDER — DICYCLOMINE HCL 20 MG
TABLET ORAL
Qty: 30 TABLET | Refills: 5 | Status: SHIPPED | OUTPATIENT
Start: 2023-08-02

## 2023-08-08 DIAGNOSIS — F41.9 ANXIETY: ICD-10-CM

## 2023-08-08 RX ORDER — SERTRALINE HYDROCHLORIDE 100 MG/1
200 TABLET, FILM COATED ORAL DAILY
Qty: 60 TABLET | Refills: 5 | Status: SHIPPED | OUTPATIENT
Start: 2023-08-08

## 2023-08-10 ENCOUNTER — TELEPHONE (OUTPATIENT)
Dept: FAMILY MEDICINE CLINIC | Facility: CLINIC | Age: 48
End: 2023-08-10

## 2023-08-10 DIAGNOSIS — F41.9 ANXIETY: ICD-10-CM

## 2023-08-10 DIAGNOSIS — G47.00 INSOMNIA, UNSPECIFIED TYPE: ICD-10-CM

## 2023-08-10 DIAGNOSIS — S16.1XXA STRAIN OF NECK MUSCLE, INITIAL ENCOUNTER: ICD-10-CM

## 2023-08-10 NOTE — TELEPHONE ENCOUNTER
Received a medical record request from [place] of Disability determination. They are requesting records from 1/5/22 to present. Request sent to [facility] and confirmation received.

## 2023-08-11 RX ORDER — TRAZODONE HYDROCHLORIDE 100 MG/1
200 TABLET ORAL
Qty: 60 TABLET | Refills: 0 | Status: SHIPPED | OUTPATIENT
Start: 2023-08-11

## 2023-08-11 NOTE — TELEPHONE ENCOUNTER
Medication:  PDMP     1 [id number] 06/20/2023 06/20/2023 LORazepam 1 MG TABLET (non-liquid) 45.0 30 1 MG NA NOHEMI ELEANOR-SANJIV [facility] [employer]  0 / 0 [place]    1 [id number] 05/10/2023 05/10/2023 LORazepam 1 MG TABLET (non-liquid) 45.0 30 1 MG NA NOHEMI COSTELLO [facility] [employer]  0 / 0 [place]    1 [id number] 03/02/2023 03/02/2023 LORazepam 1 MG TABLET (non-liquid) 45.0 30 1 MG NA NOHEMI COSTELLO [facility]  [employer]  0 / 1 [place]    1 [id number] 01/17/2023 12/27/2022 LORazepam (Tablet) 45.0 30 1 MG NA NOHEMI [facility] [employer] Commercial Insurance 1 / 2 [place]        Active agreement on file -Yes

## 2023-08-13 RX ORDER — CYCLOBENZAPRINE HCL 10 MG
10 TABLET ORAL 3 TIMES DAILY PRN
Qty: 30 TABLET | Refills: 0 | Status: SHIPPED | OUTPATIENT
Start: 2023-08-13

## 2023-08-13 RX ORDER — LORAZEPAM 2 MG/1
2 TABLET ORAL EVERY 6 HOURS PRN
Qty: 120 TABLET | Refills: 0 | Status: SHIPPED | OUTPATIENT
Start: 2023-08-13

## 2023-08-18 PROBLEM — Z00.00 HEALTH MAINTENANCE EXAMINATION: Status: RESOLVED | Noted: 2020-02-25 | Resolved: 2023-08-18

## 2023-09-13 DIAGNOSIS — G47.00 INSOMNIA, UNSPECIFIED TYPE: ICD-10-CM

## 2023-09-14 RX ORDER — TRAZODONE HYDROCHLORIDE 100 MG/1
200 TABLET ORAL
Qty: 60 TABLET | Refills: 0 | Status: SHIPPED | OUTPATIENT
Start: 2023-09-14

## 2023-09-18 DIAGNOSIS — F41.9 ANXIETY: ICD-10-CM

## 2023-09-19 RX ORDER — SERTRALINE HYDROCHLORIDE 100 MG/1
200 TABLET, FILM COATED ORAL DAILY
Qty: 60 TABLET | Refills: 0 | Status: SHIPPED | OUTPATIENT
Start: 2023-09-19

## 2023-09-22 DIAGNOSIS — F33.1 MODERATE EPISODE OF RECURRENT MAJOR DEPRESSIVE DISORDER (HCC): ICD-10-CM

## 2023-09-23 DIAGNOSIS — F41.9 ANXIETY: ICD-10-CM

## 2023-09-23 RX ORDER — ARIPIPRAZOLE 30 MG/1
30 TABLET ORAL DAILY
Qty: 30 TABLET | Refills: 3 | Status: SHIPPED | OUTPATIENT
Start: 2023-09-23

## 2023-09-25 RX ORDER — LORAZEPAM 2 MG/1
2 TABLET ORAL EVERY 6 HOURS PRN
Qty: 120 TABLET | Refills: 0 | Status: SHIPPED | OUTPATIENT
Start: 2023-09-25

## 2023-09-25 NOTE — TELEPHONE ENCOUNTER
Medication:  PDMP   1 [id number] 08/25/2023 08/13/2023 LORazepam (Tablet) 90.0 30 2 MG NA WILLIAM BURNHAM [facility] Medicaid 0 / 0 [place]    1 [id number] 08/07/2023 07/13/2023 LORazepam (Tablet) 30.0 10 2 MG NA WILLIAM [facility] Medicaid 1 / 0 [place]    1 [id number] 07/13/2023 07/13/2023 LORazepam (Tablet) 90.0 30 2 MG NA WILLIAM [facility] [place] 0 / 0 [place]          Active agreement on file - Yes

## 2023-10-05 ENCOUNTER — OFFICE VISIT (OUTPATIENT)
Dept: FAMILY MEDICINE CLINIC | Facility: CLINIC | Age: 48
End: 2023-10-05
Payer: COMMERCIAL

## 2023-10-05 VITALS
BODY MASS INDEX: 18.52 KG/M2 | DIASTOLIC BLOOD PRESSURE: 62 MMHG | HEIGHT: 55 IN | SYSTOLIC BLOOD PRESSURE: 114 MMHG | TEMPERATURE: 98.4 F | WEIGHT: 80 LBS

## 2023-10-05 DIAGNOSIS — Z72.0 TOBACCO USE: ICD-10-CM

## 2023-10-05 DIAGNOSIS — F41.9 ANXIETY: ICD-10-CM

## 2023-10-05 DIAGNOSIS — G47.00 INSOMNIA, UNSPECIFIED TYPE: ICD-10-CM

## 2023-10-05 DIAGNOSIS — F52.0 LACK OF LIBIDO: ICD-10-CM

## 2023-10-05 DIAGNOSIS — E78.2 MIXED HYPERLIPIDEMIA: Primary | ICD-10-CM

## 2023-10-05 DIAGNOSIS — R32 URINARY INCONTINENCE, UNSPECIFIED TYPE: ICD-10-CM

## 2023-10-05 DIAGNOSIS — G43.909 MIGRAINE WITHOUT STATUS MIGRAINOSUS, NOT INTRACTABLE, UNSPECIFIED MIGRAINE TYPE: ICD-10-CM

## 2023-10-05 DIAGNOSIS — Z12.11 COLON CANCER SCREENING: ICD-10-CM

## 2023-10-05 DIAGNOSIS — F33.1 MODERATE EPISODE OF RECURRENT MAJOR DEPRESSIVE DISORDER (HCC): ICD-10-CM

## 2023-10-05 PROBLEM — R10.30 LOWER ABDOMINAL PAIN: Status: RESOLVED | Noted: 2019-05-16 | Resolved: 2023-10-05

## 2023-10-05 PROBLEM — R11.0 NAUSEA: Status: RESOLVED | Noted: 2021-10-26 | Resolved: 2023-10-05

## 2023-10-05 PROCEDURE — 99214 OFFICE O/P EST MOD 30 MIN: CPT | Performed by: FAMILY MEDICINE

## 2023-10-05 NOTE — PROGRESS NOTES
Name: Nathalie Valladares      : 1975      MRN: [id number]  Encounter Provider: Roman Garcia MD  Encounter Date: 10/5/2023   Encounter department: [address] [place] [address] [address] [facility]   Return visit in 4 months  1. Mixed hyperlipidemia  Assessment & Plan:  Continue Crestor 10 mg daily      2. Urinary incontinence, unspecified type    3. Anxiety  Assessment & Plan:  Continue Zoloft 200 mg daily and Ativan 2 mg 4 times daily as needed. 4. Moderate episode of recurrent major depressive disorder (HCC)  Assessment & Plan:  Continue Abilify 30 mg daily      5. Insomnia, unspecified type    6. Tobacco use    7. Colon cancer screening  -     Ambulatory Referral to Gastroenterology; Future    8. Migraine without status migrainosus, not intractable, unspecified migraine type    9. Lack of libido  Assessment & Plan:  She is advised to discuss this with her gynecologist           Subjective      Patient comes in for checkup. We stopped prescribing tramadol to her after positive urine drug test for morphine last visit. She said she a severe migraine headache and was given her this by her sister-in-law. I explained to her that we can longer prescribe this due to violation of her opioid agreement. She complains of lack of libido. Review of Systems   Constitutional: Negative. Respiratory: Negative. Cardiovascular: Negative. Musculoskeletal: Positive for arthralgias and back pain.        Current Outpatient Medications on File Prior to Visit   Medication Sig   • ARIPiprazole (ABILIFY) 30 mg tablet TAKE 1 TABLET (30 MG TOTAL) BY MOUTH DAILY   • cyclobenzaprine (FLEXERIL) 10 mg tablet Take 1 tablet (10 mg total) by mouth 3 (three) times a day as needed for muscle spasms   • dicyclomine (BENTYL) 20 mg tablet TAKE (1) TABLET BY MOUTH EVERY 6 HOURS AS NEEDED (ABDOMINAL PAIN)   • gabapentin (NEURONTIN) 300 mg capsule Take 1 capsule (300 mg total) by mouth 3 (three) times a day   • LORazepam (ATIVAN) 2 mg tablet Take 1 tablet (2 mg total) by mouth every 6 (six) hours as needed for anxiety   • ondansetron (ZOFRAN) 4 mg tablet Take 1 tablet (4 mg total) by mouth every 8 (eight) hours as needed for nausea or vomiting   • oxybutynin (DITROPAN-XL) 5 mg 24 hr tablet TAKE 1 TABLET (5 MG TOTAL) BY MOUTH DAILY   • predniSONE 20 mg tablet Take 20 mg by mouth if needed   • rosuvastatin (CRESTOR) 10 MG tablet TAKE 1 TABLET (10 MG TOTAL) BY MOUTH DAILY   • sertraline (ZOLOFT) 100 mg tablet Take 2 tablets (200 mg total) by mouth daily   • traZODone (DESYREL) 100 mg tablet Take 2 tablets (200 mg total) by mouth daily at bedtime   • [DISCONTINUED] benzonatate (TESSALON) 200 MG capsule    • [DISCONTINUED] naloxone (NARCAN) 4 mg/0.1 mL nasal spray Administer 1 spray into a nostril. If no response after 2-3 minutes, give another dose in the other nostril using a new spray. Objective     /62 (BP Location: Left arm, Patient Position: Sitting, Cuff Size: Standard)   Temp 98.4 °F (36.9 °C)   Ht 4' 7" (1.397 m)   Wt 36.3 kg (80 lb)   BMI 18.59 kg/m²     Physical Exam  Constitutional:       Appearance: Normal appearance. She is well-developed. HENT:      Head: Normocephalic and atraumatic. Left Ear: External ear normal.   Eyes:      Pupils: Pupils are equal, round, and reactive to light. Neck:      Thyroid: No thyromegaly. Cardiovascular:      Rate and Rhythm: Normal rate and regular rhythm. Heart sounds: Normal heart sounds. Pulmonary:      Effort: Pulmonary effort is normal.      Breath sounds: Normal breath sounds. Musculoskeletal:         General: Normal range of motion. Cervical back: Neck supple. Lymphadenopathy:      Cervical: No cervical adenopathy. Skin:     General: Skin is warm and dry. Neurological:      Mental Status: She is alert.    Psychiatric:         Mood and Affect: Mood normal.         Behavior: Behavior normal.       Yonathan Desir MD

## 2023-10-18 DIAGNOSIS — R23.2 HOT FLASHES: ICD-10-CM

## 2023-10-22 RX ORDER — GABAPENTIN 300 MG/1
300 CAPSULE ORAL 3 TIMES DAILY
Qty: 90 CAPSULE | Refills: 5 | Status: SHIPPED | OUTPATIENT
Start: 2023-10-22

## 2023-10-24 DIAGNOSIS — K58.9 IRRITABLE BOWEL SYNDROME, UNSPECIFIED TYPE: ICD-10-CM

## 2023-10-24 DIAGNOSIS — F41.9 ANXIETY: ICD-10-CM

## 2023-10-24 DIAGNOSIS — R32 URINARY INCONTINENCE, UNSPECIFIED TYPE: ICD-10-CM

## 2023-10-24 RX ORDER — OXYBUTYNIN CHLORIDE 5 MG/1
5 TABLET, EXTENDED RELEASE ORAL DAILY
Qty: 30 TABLET | Refills: 5 | Status: SHIPPED | OUTPATIENT
Start: 2023-10-24

## 2023-10-24 RX ORDER — LORAZEPAM 2 MG/1
2 TABLET ORAL EVERY 6 HOURS PRN
Qty: 120 TABLET | Refills: 0 | Status: SHIPPED | OUTPATIENT
Start: 2023-10-24

## 2023-10-24 RX ORDER — DICYCLOMINE HCL 20 MG
20 TABLET ORAL 4 TIMES DAILY PRN
Qty: 30 TABLET | Refills: 5 | Status: SHIPPED | OUTPATIENT
Start: 2023-10-24

## 2023-10-24 NOTE — TELEPHONE ENCOUNTER
Medication:  PDMP   [id number] 10/05/2023 09/25/2023 LORazepam (Tablet) 90.0 30 2 MG NA WILLIAM CHACHA [facility] [place] 0 / 0 [place]     1 [id number] 09/19/2023 08/13/2023 LORazepam (Tablet) 30.0 10 2 MG NA WILLIAM [facility] Medicaid 1 / 0 [place]    1 [id number] 08/25/2023 08/13/2023 LORazepam (Tablet) 90.0 30 2 MG NA [facility] [place] 0 / 0 [place]    1 [id number] 08/07/2023 07/13/2023 LORazepam (Tablet) 30.0 10 2 MG NA WILLIAM [facility] II [facility] Medicaid 1 / 0 [place]        Active agreement on file -Yes

## 2023-10-27 ENCOUNTER — TELEPHONE (OUTPATIENT)
Age: 48
End: 2023-10-27

## 2023-11-03 ENCOUNTER — TELEPHONE (OUTPATIENT)
Dept: FAMILY MEDICINE CLINIC | Facility: CLINIC | Age: 48
End: 2023-11-03

## 2023-11-03 NOTE — TELEPHONE ENCOUNTER
T/c from pt - pt is at the eye  - was diagnosed with retro bulbar optic neuritis - the eye dr states she needs a prescription of oral steroid. Advised Dr Lacey Soliz is not in on Fridays but he does check his messages. Please advise.

## 2023-11-06 NOTE — TELEPHONE ENCOUNTER
T/c from pt - pt states this needed to be done ASAP when she called on 11/3 - her vision is greatly effected by this issue - Pt is calling eye dr and requesting they fax the OV note to us, however we don't get faxes right away - pt states if this can please be sent in to her pharmacy ASAP. No appts available until 11/9 - pt reiterated, she needed to start it ASAP. Please advise.

## 2023-11-06 NOTE — TELEPHONE ENCOUNTER
Pt called back stating that the eye dr is faxing over the office notes. (They take about 24 hours to get to us). Pt would like to get stated on something before then. Pt is asking if Dr Noemi Ozuna can squeeze her in somewhere before Thursday.      Please advise

## 2023-11-09 DIAGNOSIS — G47.00 INSOMNIA, UNSPECIFIED TYPE: ICD-10-CM

## 2023-11-09 RX ORDER — TRAZODONE HYDROCHLORIDE 100 MG/1
200 TABLET ORAL
Qty: 60 TABLET | Refills: 0 | Status: SHIPPED | OUTPATIENT
Start: 2023-11-09

## 2023-11-15 DIAGNOSIS — K58.9 IRRITABLE BOWEL SYNDROME, UNSPECIFIED TYPE: ICD-10-CM

## 2023-11-15 RX ORDER — DICYCLOMINE HCL 20 MG
20 TABLET ORAL 4 TIMES DAILY PRN
Qty: 30 TABLET | Refills: 5 | Status: SHIPPED | OUTPATIENT
Start: 2023-11-15

## 2023-12-04 DIAGNOSIS — S16.1XXA STRAIN OF NECK MUSCLE, INITIAL ENCOUNTER: ICD-10-CM

## 2023-12-04 DIAGNOSIS — F33.1 MODERATE EPISODE OF RECURRENT MAJOR DEPRESSIVE DISORDER (HCC): ICD-10-CM

## 2023-12-04 DIAGNOSIS — F41.9 ANXIETY: ICD-10-CM

## 2023-12-04 RX ORDER — LORAZEPAM 2 MG/1
2 TABLET ORAL EVERY 6 HOURS PRN
Qty: 120 TABLET | Refills: 0 | Status: SHIPPED | OUTPATIENT
Start: 2023-12-04

## 2023-12-04 RX ORDER — SERTRALINE HYDROCHLORIDE 100 MG/1
200 TABLET, FILM COATED ORAL DAILY
Qty: 60 TABLET | Refills: 5 | Status: SHIPPED | OUTPATIENT
Start: 2023-12-04

## 2023-12-04 RX ORDER — ARIPIPRAZOLE 30 MG/1
30 TABLET ORAL DAILY
Qty: 30 TABLET | Refills: 5 | Status: SHIPPED | OUTPATIENT
Start: 2023-12-04

## 2023-12-04 RX ORDER — CYCLOBENZAPRINE HCL 10 MG
10 TABLET ORAL 3 TIMES DAILY PRN
Qty: 30 TABLET | Refills: 5 | Status: SHIPPED | OUTPATIENT
Start: 2023-12-04

## 2023-12-04 NOTE — TELEPHONE ENCOUNTER
Medication:  PDMP   1 [id number] 10/26/2023 10/24/2023 LORazepam (Tablet) 120.0 30 2 MG NA WILLIAM CHACHA [facility] Commercial [place] 0 / 0 [place]    1 [id number] 10/05/2023 09/25/2023 LORazepam (Tablet) 90.0 30 2 MG NA WILLIAM [facility] Medicaid 0 / 0 [place]    1 [id number] 09/19/2023 08/13/2023 LORazepam (Tablet) 30.0 10 2 MG NA WILLIAM [facility] [place] 1 / 0 [place]    1 [id number] 08/25/2023 08/13/2023 LORazepam (Tablet) 90.0 30 2 MG NA WILLIAM [facility] [place] 0 / 0 [place]        Active agreement on file -Yes

## 2023-12-05 NOTE — TELEPHONE ENCOUNTER
Medication:  [facility]    [id number] 02/15/2023  02/14/2023 LORazepam (Tablet)  120 0 30 2 MG NA WILL[facility] [employer] [facility]  [employer] 0 / 0 [place]    1  [id number] 02/03/2023 02/02/2023 traMADol HCL (Tablet)  60 0 30 50 MG  10 0 RYLAN [address]  [employer] 0 / 0 [place]    1  [id number] 01/16/2023  01/15/2023 LORazepam (Tablet)  120 0 30 2 MG NA WILLIAM [employer]  [place], [facility]  [employer] 0 / 0 [place]    1  [id number] 01/03/2023 01/03/2023 traMADol HCL (Tablet)  60 0 30 50 MG  10 0 [employer] [place] [facility]         Active agreement on file -Yes alert

## 2023-12-17 DIAGNOSIS — G47.00 INSOMNIA, UNSPECIFIED TYPE: ICD-10-CM

## 2023-12-17 DIAGNOSIS — R23.2 HOT FLASHES: ICD-10-CM

## 2023-12-18 RX ORDER — GABAPENTIN 300 MG/1
300 CAPSULE ORAL 3 TIMES DAILY
Qty: 90 CAPSULE | Refills: 0 | Status: SHIPPED | OUTPATIENT
Start: 2023-12-18

## 2023-12-18 RX ORDER — TRAZODONE HYDROCHLORIDE 100 MG/1
200 TABLET ORAL
Qty: 60 TABLET | Refills: 0 | Status: SHIPPED | OUTPATIENT
Start: 2023-12-18

## 2024-01-06 DIAGNOSIS — S16.1XXA STRAIN OF NECK MUSCLE, INITIAL ENCOUNTER: ICD-10-CM

## 2024-01-08 RX ORDER — CYCLOBENZAPRINE HCL 10 MG
10 TABLET ORAL 3 TIMES DAILY PRN
Qty: 90 TABLET | Refills: 5 | Status: SHIPPED | OUTPATIENT
Start: 2024-01-08

## 2024-01-08 NOTE — TELEPHONE ENCOUNTER
Requested medication(s) are due for refill today: Yes  Patient has already received a courtesy refill: No  Other reason request has been forwarded to provider: Rx refill cannot be delegated

## 2024-01-12 DIAGNOSIS — F41.9 ANXIETY: ICD-10-CM

## 2024-01-12 RX ORDER — LORAZEPAM 2 MG/1
2 TABLET ORAL EVERY 6 HOURS PRN
Qty: 120 TABLET | Refills: 0 | Status: SHIPPED | OUTPATIENT
Start: 2024-01-12

## 2024-01-12 NOTE — TELEPHONE ENCOUNTER
Medication:  PDMP     1 [id number] 01/03/2024 12/04/2023 LORazepam (Tablet) 30.0 10 2 MG NA WILLIAM BURNHAM [facility] II [facility] [place] 1 / 0 [place]    1 [id number] 12/04/2023 12/04/2023 LORazepam (Tablet) 90.0 30 2 MG NA WILLIAM BURNHAM [facility] [place] 0 / 0 [place]    1 [id number] 10/26/2023 10/24/2023 LORazepam (Tablet) 120.0 30 2 MG NA WILLIAM CHACHA [facility] Commercial Insurance 0 / 0 [place]        Active agreement on file -Yes

## 2024-01-16 DIAGNOSIS — R23.2 HOT FLASHES: ICD-10-CM

## 2024-01-16 DIAGNOSIS — G47.00 INSOMNIA, UNSPECIFIED TYPE: ICD-10-CM

## 2024-01-16 RX ORDER — TRAZODONE HYDROCHLORIDE 100 MG/1
200 TABLET ORAL
Qty: 60 TABLET | Refills: 0 | Status: SHIPPED | OUTPATIENT
Start: 2024-01-16

## 2024-01-16 RX ORDER — GABAPENTIN 300 MG/1
300 CAPSULE ORAL 3 TIMES DAILY
Qty: 90 CAPSULE | Refills: 0 | Status: SHIPPED | OUTPATIENT
Start: 2024-01-16

## 2024-01-30 DIAGNOSIS — R11.0 NAUSEA: ICD-10-CM

## 2024-01-30 RX ORDER — ONDANSETRON 4 MG/1
4 TABLET, FILM COATED ORAL EVERY 8 HOURS PRN
Qty: 20 TABLET | Refills: 0 | Status: SHIPPED | OUTPATIENT
Start: 2024-01-30

## 2024-02-05 ENCOUNTER — OFFICE VISIT (OUTPATIENT)
Dept: FAMILY MEDICINE CLINIC | Facility: CLINIC | Age: 49
End: 2024-02-05
Payer: COMMERCIAL

## 2024-02-05 VITALS
HEIGHT: 55 IN | SYSTOLIC BLOOD PRESSURE: 90 MMHG | WEIGHT: 90 LBS | DIASTOLIC BLOOD PRESSURE: 58 MMHG | BODY MASS INDEX: 20.83 KG/M2 | OXYGEN SATURATION: 97 % | HEART RATE: 75 BPM | TEMPERATURE: 98.4 F

## 2024-02-05 DIAGNOSIS — R32 URINARY INCONTINENCE, UNSPECIFIED TYPE: ICD-10-CM

## 2024-02-05 DIAGNOSIS — F33.1 MODERATE EPISODE OF RECURRENT MAJOR DEPRESSIVE DISORDER (HCC): ICD-10-CM

## 2024-02-05 DIAGNOSIS — Z87.891 FORMER SMOKER: ICD-10-CM

## 2024-02-05 DIAGNOSIS — F41.9 ANXIETY: ICD-10-CM

## 2024-02-05 DIAGNOSIS — E78.2 MIXED HYPERLIPIDEMIA: Primary | ICD-10-CM

## 2024-02-05 DIAGNOSIS — Z12.11 COLON CANCER SCREENING: ICD-10-CM

## 2024-02-05 PROBLEM — Z72.0 TOBACCO USE: Status: RESOLVED | Noted: 2018-04-10 | Resolved: 2024-02-05

## 2024-02-05 PROBLEM — R63.4 WEIGHT LOSS: Status: RESOLVED | Noted: 2018-04-10 | Resolved: 2024-02-05

## 2024-02-05 PROCEDURE — 99214 OFFICE O/P EST MOD 30 MIN: CPT | Performed by: FAMILY MEDICINE

## 2024-02-05 RX ORDER — SERTRALINE HYDROCHLORIDE 100 MG/1
100 TABLET, FILM COATED ORAL DAILY
Qty: 30 TABLET | Refills: 5 | Status: SHIPPED | OUTPATIENT
Start: 2024-02-05

## 2024-02-14 DIAGNOSIS — F41.9 ANXIETY: ICD-10-CM

## 2024-02-14 RX ORDER — LORAZEPAM 2 MG/1
2 TABLET ORAL EVERY 6 HOURS PRN
Qty: 120 TABLET | Refills: 0 | Status: SHIPPED | OUTPATIENT
Start: 2024-02-14

## 2024-02-14 NOTE — TELEPHONE ENCOUNTER
Medication:  PDMP   1 [id number] 01/15/2024 01/12/2024 LORazepam (Tablet) 120.0 30 2 MG NA NOHEMI WEBSTER-SANJIV [facility] Commercial [place] 0 / 0 [place]    1 [id number] 01/03/2024 12/04/2023 LORazepam (Tablet) 30.0 10 2 MG NA WILLIAM BURNHAM [facility] [place] 1 / 0 [place]    1 [id number] 12/04/2023 12/04/2023 LORazepam (Tablet) 90.0 30 2 MG NA WILLIAM BURNHAM [facility] [place] 0 / 0 [place]          Active agreement on file -Yes

## 2024-02-20 DIAGNOSIS — R32 URINARY INCONTINENCE, UNSPECIFIED TYPE: ICD-10-CM

## 2024-02-20 RX ORDER — OXYBUTYNIN CHLORIDE 5 MG/1
5 TABLET, EXTENDED RELEASE ORAL DAILY
Qty: 30 TABLET | Refills: 5 | Status: SHIPPED | OUTPATIENT
Start: 2024-02-20

## 2024-02-26 DIAGNOSIS — G47.00 INSOMNIA, UNSPECIFIED TYPE: ICD-10-CM

## 2024-02-26 RX ORDER — TRAZODONE HYDROCHLORIDE 100 MG/1
200 TABLET ORAL
Qty: 60 TABLET | Refills: 0 | Status: SHIPPED | OUTPATIENT
Start: 2024-02-26

## 2024-03-25 DIAGNOSIS — G47.00 INSOMNIA, UNSPECIFIED TYPE: ICD-10-CM

## 2024-03-25 RX ORDER — TRAZODONE HYDROCHLORIDE 100 MG/1
200 TABLET ORAL
Qty: 60 TABLET | Refills: 0 | Status: SHIPPED | OUTPATIENT
Start: 2024-03-25

## 2024-04-04 DIAGNOSIS — F41.9 ANXIETY: ICD-10-CM

## 2024-04-04 RX ORDER — LORAZEPAM 2 MG/1
2 TABLET ORAL EVERY 6 HOURS PRN
Qty: 120 TABLET | Refills: 0 | Status: SHIPPED | OUTPATIENT
Start: 2024-04-04

## 2024-04-04 NOTE — TELEPHONE ENCOUNTER
Medication:  PDMP     1 [id number] 02/26/2024 02/14/2024 LORazepam 2 MG TABLET (non-liquid) 120.0 30 2 MG NA WILLIAM BURNHAM [facility] Commercial [place] 0 / 0 [place]    1 [id number] 01/15/2024 01/12/2024 LORazepam (Tablet) 120.0 30 2 MG NA NOHEMI COSTELLO [facility] Commercial Insurance 0 / 0 [place]    1 [id number] 01/03/2024 12/04/2023 LORazepam (Tablet) 30.0 10 2 MG NA WILLIAM BURNHAM [facility] II [facility] Medicaid 1 / 0 [place]          Active agreement on file -Yes

## 2024-04-10 DIAGNOSIS — K58.9 IRRITABLE BOWEL SYNDROME, UNSPECIFIED TYPE: ICD-10-CM

## 2024-04-10 DIAGNOSIS — R11.0 NAUSEA: ICD-10-CM

## 2024-04-10 RX ORDER — DICYCLOMINE HCL 20 MG
20 TABLET ORAL 4 TIMES DAILY PRN
Qty: 30 TABLET | Refills: 0 | Status: SHIPPED | OUTPATIENT
Start: 2024-04-10

## 2024-04-10 RX ORDER — ONDANSETRON 4 MG/1
4 TABLET, FILM COATED ORAL EVERY 8 HOURS PRN
Qty: 20 TABLET | Refills: 0 | Status: SHIPPED | OUTPATIENT
Start: 2024-04-10

## 2024-04-25 DIAGNOSIS — G47.00 INSOMNIA, UNSPECIFIED TYPE: ICD-10-CM

## 2024-04-27 RX ORDER — TRAZODONE HYDROCHLORIDE 100 MG/1
200 TABLET ORAL
Qty: 60 TABLET | Refills: 0 | Status: SHIPPED | OUTPATIENT
Start: 2024-04-27

## 2024-05-06 DIAGNOSIS — S16.1XXA STRAIN OF NECK MUSCLE, INITIAL ENCOUNTER: ICD-10-CM

## 2024-05-07 RX ORDER — CYCLOBENZAPRINE HCL 10 MG
10 TABLET ORAL 3 TIMES DAILY PRN
Qty: 90 TABLET | Refills: 0 | Status: SHIPPED | OUTPATIENT
Start: 2024-05-07

## 2024-05-09 ENCOUNTER — OFFICE VISIT (OUTPATIENT)
Dept: FAMILY MEDICINE CLINIC | Facility: CLINIC | Age: 49
End: 2024-05-09
Payer: COMMERCIAL

## 2024-05-09 VITALS
HEIGHT: 55 IN | TEMPERATURE: 98.3 F | SYSTOLIC BLOOD PRESSURE: 106 MMHG | WEIGHT: 107.6 LBS | DIASTOLIC BLOOD PRESSURE: 64 MMHG | OXYGEN SATURATION: 99 % | HEART RATE: 80 BPM | BODY MASS INDEX: 24.9 KG/M2

## 2024-05-09 DIAGNOSIS — F33.1 MODERATE EPISODE OF RECURRENT MAJOR DEPRESSIVE DISORDER (HCC): ICD-10-CM

## 2024-05-09 DIAGNOSIS — G47.00 INSOMNIA, UNSPECIFIED TYPE: ICD-10-CM

## 2024-05-09 DIAGNOSIS — S20.221D CONTUSION OF RIGHT SIDE OF BACK, SUBSEQUENT ENCOUNTER: ICD-10-CM

## 2024-05-09 DIAGNOSIS — E78.2 MIXED HYPERLIPIDEMIA: Primary | ICD-10-CM

## 2024-05-09 DIAGNOSIS — F41.9 ANXIETY: ICD-10-CM

## 2024-05-09 PROBLEM — F52.0 LACK OF LIBIDO: Status: RESOLVED | Noted: 2023-10-05 | Resolved: 2024-05-09

## 2024-05-09 PROBLEM — S20.221A CONTUSION OF RIGHT SIDE OF BACK: Status: ACTIVE | Noted: 2024-05-09

## 2024-05-09 PROCEDURE — 99214 OFFICE O/P EST MOD 30 MIN: CPT | Performed by: FAMILY MEDICINE

## 2024-05-09 RX ORDER — DICLOFENAC POTASSIUM 50 MG/1
50 TABLET, FILM COATED ORAL 3 TIMES DAILY
Qty: 90 TABLET | Refills: 0 | Status: SHIPPED | OUTPATIENT
Start: 2024-05-09 | End: 2024-05-14

## 2024-05-09 NOTE — PROGRESS NOTES
Name: Love Rosario      : 1975      MRN: [id number]  Encounter Provider: Luke Gibbs MD  Encounter Date: 2024   Encounter department: [facility] [address]    Assessment & Plan     1. Mixed hyperlipidemia  Assessment & Plan:  Continue Crestor 10 mg daily      2. Moderate episode of recurrent major depressive disorder (HCC)  Assessment & Plan:  Continue Zoloft 100 mg daily      3. Anxiety  Assessment & Plan:  Continue Ativan 2 mg as needed      4. Insomnia, unspecified type  Assessment & Plan:  Continue trazodone 2 mg nightly      5. Contusion of right side of back, subsequent encounter  -     diclofenac potassium (CATAFLAM) 50 mg tablet; Take 1 tablet (50 mg total) by mouth 3 (three) times a day        Depression Screening and Follow-up Plan: Patient was screened for depression during today's encounter. They screened negative with a PHQ-9 score of 0.        Subjective      Patient comes in 5 days after tripping and falling down stairs and injuring her right mid back.  She was seen in immediate care where x-rays were negative for any rib fractures.    Fall      Review of Systems   Constitutional: Negative.    Respiratory: Negative.     Cardiovascular: Negative.    Musculoskeletal:  Positive for back pain.       Current Outpatient Medications on File Prior to Visit   Medication Sig    ARIPiprazole (ABILIFY) 30 mg tablet Take 1 tablet (30 mg total) by mouth daily    cyclobenzaprine (FLEXERIL) 10 mg tablet Take 1 tablet (10 mg total) by mouth 3 (three) times a day as needed for muscle spasms    dicyclomine (BENTYL) 20 mg tablet Take 1 tablet (20 mg total) by mouth 4 (four) times a day as needed (Abdominal pain)    gabapentin (NEURONTIN) 300 mg capsule TAKE 1 CAPSULE (300 MG TOTAL) BY MOUTH 3 (THREE) TIMES A DAY    LORazepam (ATIVAN) 2 mg tablet Take 1 tablet (2 mg total) by mouth every 6 (six) hours as needed for anxiety    ondansetron (ZOFRAN) 4 mg tablet Take 1  "tablet (4 mg total) by mouth every 8 (eight) hours as needed for nausea or vomiting    oxybutynin (DITROPAN-XL) 5 mg 24 hr tablet TAKE 1 TABLET (5 MG TOTAL) BY MOUTH DAILY    rosuvastatin (CRESTOR) 10 MG tablet TAKE 1 TABLET (10 MG TOTAL) BY MOUTH DAILY    sertraline (ZOLOFT) 100 mg tablet Take 1 tablet (100 mg total) by mouth daily    traZODone (DESYREL) 100 mg tablet Take 2 tablets (200 mg total) by mouth daily at bedtime    [DISCONTINUED] predniSONE 20 mg tablet Take 20 mg by mouth if needed       Objective     /64 (BP Location: Left arm, Patient Position: Sitting, Cuff Size: Standard)   Pulse 80   Temp 98.3 °F (36.8 °C) (Tympanic)   Ht 4' 7\" (1.397 m)   Wt 48.8 kg (107 lb 9.6 oz)   SpO2 99%   BMI 25.01 kg/m²     Physical Exam  Constitutional:       Appearance: Normal appearance. She is well-developed.   HENT:      Head: Normocephalic and atraumatic.      Right Ear: External ear normal.      Left Ear: External ear normal.   Eyes:      Pupils: Pupils are equal, round, and reactive to light.   Neck:      Thyroid: No thyromegaly.   Cardiovascular:      Rate and Rhythm: Normal rate and regular rhythm.      Heart sounds: Normal heart sounds.   Pulmonary:      Effort: Pulmonary effort is normal.      Breath sounds: Normal breath sounds.   Abdominal:      Palpations: Abdomen is soft.   Musculoskeletal:      Cervical back: Neck supple.      Comments: Tender right mid back.  No midline tenderness.   Lymphadenopathy:      Cervical: No cervical adenopathy.   Skin:     General: Skin is warm and dry.   Neurological:      Mental Status: She is alert.   Psychiatric:         Mood and Affect: Mood normal.         Behavior: Behavior normal.       Luke Gibbs MD    "

## 2024-05-13 ENCOUNTER — TELEPHONE (OUTPATIENT)
Age: 49
End: 2024-05-13

## 2024-05-13 DIAGNOSIS — S20.221D CONTUSION OF RIGHT SIDE OF BACK, SUBSEQUENT ENCOUNTER: ICD-10-CM

## 2024-05-13 NOTE — TELEPHONE ENCOUNTER
PA for diclofenac potassium (CATAFLAM) 50 mg tablet     Submitted via    [x]CMM-KEY [id number]  []Surescripts-Case ID #   []Faxed to plan   []Other website   []Phone call Case ID #     Office notes sent, clinical questions answered. Awaiting determination    Turnaround time for your insurance to make a decision on your Prior Authorization can take 7-21 business days.

## 2024-05-13 NOTE — TELEPHONE ENCOUNTER
PA for diclofenac potassium (CATAFLAM) 50 mg tablet Denied    Reason:            Message sent to provider pool Yes    Denial letter scanned into Media Yes    Appeal started No    (Provider will need to decide if appeal is warranted and send clinical documentation to PA team for initiation.)

## 2024-05-14 DIAGNOSIS — F41.9 ANXIETY: ICD-10-CM

## 2024-05-14 RX ORDER — LORAZEPAM 2 MG/1
2 TABLET ORAL EVERY 6 HOURS PRN
Qty: 120 TABLET | Refills: 0 | Status: SHIPPED | OUTPATIENT
Start: 2024-05-14

## 2024-05-16 DIAGNOSIS — F33.1 MODERATE EPISODE OF RECURRENT MAJOR DEPRESSIVE DISORDER (HCC): ICD-10-CM

## 2024-05-16 DIAGNOSIS — R32 URINARY INCONTINENCE, UNSPECIFIED TYPE: ICD-10-CM

## 2024-05-17 RX ORDER — OXYBUTYNIN CHLORIDE 5 MG/1
5 TABLET, EXTENDED RELEASE ORAL DAILY
Qty: 90 TABLET | Refills: 1 | Status: SHIPPED | OUTPATIENT
Start: 2024-05-17

## 2024-05-19 RX ORDER — ARIPIPRAZOLE 30 MG/1
30 TABLET ORAL DAILY
Qty: 30 TABLET | Refills: 5 | Status: SHIPPED | OUTPATIENT
Start: 2024-05-19

## 2024-06-04 DIAGNOSIS — F41.9 ANXIETY: ICD-10-CM

## 2024-06-04 DIAGNOSIS — G47.00 INSOMNIA, UNSPECIFIED TYPE: ICD-10-CM

## 2024-06-04 RX ORDER — SERTRALINE HYDROCHLORIDE 100 MG/1
100 TABLET, FILM COATED ORAL DAILY
Qty: 30 TABLET | Refills: 0 | Status: SHIPPED | OUTPATIENT
Start: 2024-06-04

## 2024-06-04 RX ORDER — TRAZODONE HYDROCHLORIDE 100 MG/1
200 TABLET ORAL
Qty: 60 TABLET | Refills: 0 | Status: SHIPPED | OUTPATIENT
Start: 2024-06-04

## 2024-06-24 DIAGNOSIS — F41.9 ANXIETY: ICD-10-CM

## 2024-06-25 RX ORDER — LORAZEPAM 2 MG/1
2 TABLET ORAL EVERY 6 HOURS PRN
Qty: 120 TABLET | Refills: 0 | Status: SHIPPED | OUTPATIENT
Start: 2024-06-25

## 2024-07-03 DIAGNOSIS — G47.00 INSOMNIA, UNSPECIFIED TYPE: ICD-10-CM

## 2024-07-03 DIAGNOSIS — F41.9 ANXIETY: ICD-10-CM

## 2024-07-03 RX ORDER — SERTRALINE HYDROCHLORIDE 100 MG/1
100 TABLET, FILM COATED ORAL DAILY
Qty: 30 TABLET | Refills: 5 | Status: SHIPPED | OUTPATIENT
Start: 2024-07-03

## 2024-07-03 RX ORDER — TRAZODONE HYDROCHLORIDE 100 MG/1
200 TABLET ORAL
Qty: 60 TABLET | Refills: 5 | Status: SHIPPED | OUTPATIENT
Start: 2024-07-03

## 2024-07-12 ENCOUNTER — VBI (OUTPATIENT)
Dept: ADMINISTRATIVE | Facility: OTHER | Age: 49
End: 2024-07-12

## 2024-07-12 NOTE — TELEPHONE ENCOUNTER
07/12/24 10:26 AM     Chart reviewed for Pap Smear (HPV) aka Cervical Cancer Screening ; nothing is submitted to the patient's insurance at this time.     Bijal Fonseca   [facility]

## 2024-07-20 DIAGNOSIS — F41.9 ANXIETY: ICD-10-CM

## 2024-07-22 RX ORDER — LORAZEPAM 2 MG/1
2 TABLET ORAL EVERY 6 HOURS PRN
Qty: 120 TABLET | Refills: 0 | Status: SHIPPED | OUTPATIENT
Start: 2024-07-22

## 2024-08-04 DIAGNOSIS — F41.9 ANXIETY: ICD-10-CM

## 2024-08-04 DIAGNOSIS — G47.00 INSOMNIA, UNSPECIFIED TYPE: ICD-10-CM

## 2024-08-04 RX ORDER — TRAZODONE HYDROCHLORIDE 100 MG/1
200 TABLET ORAL
Qty: 60 TABLET | Refills: 0 | Status: SHIPPED | OUTPATIENT
Start: 2024-08-04

## 2024-08-05 RX ORDER — LORAZEPAM 2 MG/1
2 TABLET ORAL EVERY 6 HOURS PRN
Qty: 120 TABLET | Refills: 0 | Status: SHIPPED | OUTPATIENT
Start: 2024-08-05

## 2024-08-29 ENCOUNTER — VBI (OUTPATIENT)
Dept: ADMINISTRATIVE | Facility: OTHER | Age: 49
End: 2024-08-29

## 2024-08-29 NOTE — TELEPHONE ENCOUNTER
08/29/24 7:40 AM     Chart reviewed for CRC: Colonoscopy ; nothing is submitted to the patient's insurance at this time.     Bijal Fonseca   [facility]

## 2024-08-30 DIAGNOSIS — F41.9 ANXIETY: ICD-10-CM

## 2024-08-30 DIAGNOSIS — G47.00 INSOMNIA, UNSPECIFIED TYPE: ICD-10-CM

## 2024-08-30 RX ORDER — TRAZODONE HYDROCHLORIDE 100 MG/1
200 TABLET ORAL
Qty: 60 TABLET | Refills: 5 | Status: SHIPPED | OUTPATIENT
Start: 2024-08-30

## 2024-09-02 RX ORDER — LORAZEPAM 2 MG/1
2 TABLET ORAL EVERY 6 HOURS PRN
Qty: 120 TABLET | Refills: 0 | Status: SHIPPED | OUTPATIENT
Start: 2024-09-02

## 2024-09-16 DIAGNOSIS — R32 URINARY INCONTINENCE, UNSPECIFIED TYPE: ICD-10-CM

## 2024-09-17 RX ORDER — OXYBUTYNIN CHLORIDE 5 MG/1
5 TABLET, EXTENDED RELEASE ORAL DAILY
Qty: 90 TABLET | Refills: 1 | Status: SHIPPED | OUTPATIENT
Start: 2024-09-17

## 2024-10-04 DIAGNOSIS — G47.00 INSOMNIA, UNSPECIFIED TYPE: ICD-10-CM

## 2024-10-04 DIAGNOSIS — F33.1 MODERATE EPISODE OF RECURRENT MAJOR DEPRESSIVE DISORDER (HCC): ICD-10-CM

## 2024-10-04 RX ORDER — ARIPIPRAZOLE 30 MG/1
30 TABLET ORAL DAILY
Qty: 30 TABLET | Refills: 5 | Status: SHIPPED | OUTPATIENT
Start: 2024-10-04

## 2024-10-04 RX ORDER — TRAZODONE HYDROCHLORIDE 100 MG/1
200 TABLET ORAL
Qty: 60 TABLET | Refills: 5 | Status: SHIPPED | OUTPATIENT
Start: 2024-10-04

## 2024-10-16 DIAGNOSIS — F41.9 ANXIETY: ICD-10-CM

## 2024-10-16 RX ORDER — LORAZEPAM 2 MG/1
2 TABLET ORAL EVERY 6 HOURS PRN
Qty: 120 TABLET | Refills: 0 | Status: SHIPPED | OUTPATIENT
Start: 2024-10-16

## 2024-11-03 DIAGNOSIS — R11.0 NAUSEA: ICD-10-CM

## 2024-11-03 DIAGNOSIS — G47.00 INSOMNIA, UNSPECIFIED TYPE: ICD-10-CM

## 2024-11-03 DIAGNOSIS — S16.1XXA STRAIN OF NECK MUSCLE, INITIAL ENCOUNTER: ICD-10-CM

## 2024-11-03 DIAGNOSIS — E78.2 MIXED HYPERLIPIDEMIA: ICD-10-CM

## 2024-11-03 DIAGNOSIS — F33.1 MODERATE EPISODE OF RECURRENT MAJOR DEPRESSIVE DISORDER (HCC): ICD-10-CM

## 2024-11-05 RX ORDER — ONDANSETRON 4 MG/1
4 TABLET, FILM COATED ORAL EVERY 8 HOURS PRN
Qty: 20 TABLET | Refills: 0 | Status: SHIPPED | OUTPATIENT
Start: 2024-11-05

## 2024-11-05 RX ORDER — CYCLOBENZAPRINE HCL 10 MG
10 TABLET ORAL 3 TIMES DAILY PRN
Qty: 90 TABLET | Refills: 5 | Status: SHIPPED | OUTPATIENT
Start: 2024-11-05

## 2024-11-05 RX ORDER — ROSUVASTATIN CALCIUM 10 MG/1
10 TABLET, COATED ORAL DAILY
Qty: 30 TABLET | Refills: 0 | Status: SHIPPED | OUTPATIENT
Start: 2024-11-05

## 2024-11-05 RX ORDER — TRAZODONE HYDROCHLORIDE 100 MG/1
200 TABLET ORAL
Qty: 60 TABLET | Refills: 0 | OUTPATIENT
Start: 2024-11-05

## 2024-11-05 RX ORDER — ARIPIPRAZOLE 30 MG/1
30 TABLET ORAL DAILY
Qty: 30 TABLET | Refills: 5 | Status: SHIPPED | OUTPATIENT
Start: 2024-11-05

## 2024-11-05 NOTE — TELEPHONE ENCOUNTER
Patient called to clarify why her script was denied. Patient made aware she has remaining refills on her script. No further recommendations at this time.

## 2024-11-18 DIAGNOSIS — F41.9 ANXIETY: ICD-10-CM

## 2024-11-19 ENCOUNTER — TELEPHONE (OUTPATIENT)
Age: 49
End: 2024-11-19

## 2024-11-19 DIAGNOSIS — F41.9 ANXIETY: ICD-10-CM

## 2024-11-19 RX ORDER — SERTRALINE HYDROCHLORIDE 100 MG/1
100 TABLET, FILM COATED ORAL DAILY
Qty: 30 TABLET | Refills: 0 | Status: SHIPPED | OUTPATIENT
Start: 2024-11-19

## 2024-11-19 RX ORDER — LORAZEPAM 2 MG/1
2 TABLET ORAL EVERY 6 HOURS PRN
Qty: 120 TABLET | Refills: 0 | Status: SHIPPED | OUTPATIENT
Start: 2024-11-19

## 2024-11-19 NOTE — TELEPHONE ENCOUNTER
Patient called the refill line. She is wondering why was   sertraline (ZOLOFT) 100 mg tablet sent in and she requested   LORazepam (ATIVAN) 2 mg tablet

## 2024-11-19 NOTE — TELEPHONE ENCOUNTER
Patient called to request a refill for their LORazepam (ATIVAN) 2 mg tablet. Advised a refill was requested on 11/19/2024 and is pending approval. Patient verbalized understanding and is in agreement.

## 2024-11-27 ENCOUNTER — OFFICE VISIT (OUTPATIENT)
Dept: FAMILY MEDICINE CLINIC | Facility: CLINIC | Age: 49
End: 2024-11-27
Payer: COMMERCIAL

## 2024-11-27 VITALS
SYSTOLIC BLOOD PRESSURE: 112 MMHG | OXYGEN SATURATION: 97 % | HEIGHT: 55 IN | DIASTOLIC BLOOD PRESSURE: 70 MMHG | HEART RATE: 90 BPM | WEIGHT: 120 LBS | BODY MASS INDEX: 27.77 KG/M2 | TEMPERATURE: 99.3 F

## 2024-11-27 DIAGNOSIS — Z00.00 HEALTH MAINTENANCE EXAMINATION: ICD-10-CM

## 2024-11-27 DIAGNOSIS — E78.2 MIXED HYPERLIPIDEMIA: ICD-10-CM

## 2024-11-27 DIAGNOSIS — Z12.11 SCREENING FOR COLON CANCER: Primary | ICD-10-CM

## 2024-11-27 DIAGNOSIS — F41.9 ANXIETY: ICD-10-CM

## 2024-11-27 DIAGNOSIS — Z12.31 ENCOUNTER FOR SCREENING MAMMOGRAM FOR MALIGNANT NEOPLASM OF BREAST: ICD-10-CM

## 2024-11-27 DIAGNOSIS — F33.1 MODERATE EPISODE OF RECURRENT MAJOR DEPRESSIVE DISORDER (HCC): ICD-10-CM

## 2024-11-27 PROCEDURE — 99396 PREV VISIT EST AGE 40-64: CPT | Performed by: FAMILY MEDICINE

## 2024-11-27 RX ORDER — ESTRADIOL 0.1 MG/G
CREAM VAGINAL
COMMUNITY
Start: 2024-11-26

## 2024-11-27 NOTE — PROGRESS NOTES
Assessment/Plan:    Return visit in 6 months.  She is advised to get blood work done that was previously prescribed.     Problem List Items Addressed This Visit       Mixed hyperlipidemia    Anxiety    Health maintenance examination    Moderate episode of recurrent major depressive disorder (HCC)     Other Visit Diagnoses         Screening for colon cancer    -  Primary    Relevant Orders    Cologuard      Encounter for screening mammogram for malignant neoplasm of breast        Relevant Orders    Mammo screening bilateral w 3d and cad              Subjective:      Patient ID: Love Rosario is a 49 y.o. female.    Patient comes in for checkup.  She is feeling well without complaints.  Anxiety and depression are under good control.        The following portions of the patient's history were reviewed and updated as appropriate:   Past Medical History:  She has no past medical history on file.,  _______________________________________________________________________  Medical Problems:  does not have any pertinent problems on file.,  _______________________________________________________________________  Past Surgical History:   has a past surgical history that includes Eye surgery.,  _______________________________________________________________________  Family History:  family history includes Cancer in her father; Coronary artery disease in her mother; Heart attack in her mother; Mental illness in her father; Substance Abuse in her mother.,  _______________________________________________________________________  Social History:   reports that she quit smoking about 10 months ago. Her smoking use included cigarettes. She started smoking about 34 years ago. She has a 17 pack-year smoking history. She has never used smokeless tobacco. She reports that she does not drink alcohol and does not use drugs.,  _______________________________________________________________________  Allergies:  is allergic to sulfa  antibiotics..  _______________________________________________________________________  Current Outpatient Medications   Medication Sig Dispense Refill    ARIPiprazole (ABILIFY) 30 mg tablet Take 1 tablet (30 mg total) by mouth daily 30 tablet 5    cyclobenzaprine (FLEXERIL) 10 mg tablet Take 1 tablet (10 mg total) by mouth 3 (three) times a day as needed for muscle spasms 90 tablet 5    diclofenac sodium (VOLTAREN) 50 mg EC tablet Take 1 tablet (50 mg total) by mouth 2 (two) times a day 30 tablet 1    dicyclomine (BENTYL) 20 mg tablet Take 1 tablet (20 mg total) by mouth 4 (four) times a day as needed (Abdominal pain) 30 tablet 0    estradiol (ESTRACE) 0.1 mg/g vaginal cream UPON STARTING MEDICATION, INSERT 1G VAGINALLY, DAILY FOR THREE WEEKS. THEN INSERT 1G VAGINALLY TWO - THREE TIMES PER WEEK AS A MAINTENANCE D      gabapentin (NEURONTIN) 300 mg capsule TAKE 1 CAPSULE (300 MG TOTAL) BY MOUTH 3 (THREE) TIMES A DAY 90 capsule 0    LORazepam (ATIVAN) 2 mg tablet Take 1 tablet (2 mg total) by mouth every 6 (six) hours as needed for anxiety 120 tablet 0    ondansetron (ZOFRAN) 4 mg tablet Take 1 tablet (4 mg total) by mouth every 8 (eight) hours as needed for nausea or vomiting 20 tablet 0    oxybutynin (DITROPAN-XL) 5 mg 24 hr tablet TAKE 1 TABLET (5 MG TOTAL) BY MOUTH DAILY 90 tablet 1    rosuvastatin (CRESTOR) 10 MG tablet Take 1 tablet (10 mg total) by mouth daily 30 tablet 0    sertraline (ZOLOFT) 100 mg tablet TAKE 1 TABLET (100 MG TOTAL) BY MOUTH DAILY 30 tablet 0    traZODone (DESYREL) 100 mg tablet Take 2 tablets (200 mg total) by mouth daily at bedtime 60 tablet 5     No current facility-administered medications for this visit.     _______________________________________________________________________  Review of Systems   Constitutional: Negative.    Respiratory: Negative.     Cardiovascular: Negative.          Objective:  Vitals:    11/27/24 0919   BP: 112/70   Pulse: 90   Temp: 99.3 °F (37.4 °C)   SpO2:  "97%   Weight: 54.4 kg (120 lb)   Height: 4' 7\" (1.397 m)     Body mass index is 27.89 kg/m².     Physical Exam  Constitutional:       Appearance: Normal appearance. She is well-developed.   HENT:      Head: Normocephalic and atraumatic.      Right Ear: External ear normal.      Left Ear: External ear normal.   Eyes:      Pupils: Pupils are equal, round, and reactive to light.   Neck:      Thyroid: No thyromegaly.   Cardiovascular:      Rate and Rhythm: Normal rate and regular rhythm.      Heart sounds: Normal heart sounds.   Pulmonary:      Effort: Pulmonary effort is normal.      Breath sounds: Normal breath sounds.   Abdominal:      Palpations: Abdomen is soft. There is no mass.      Tenderness: There is no abdominal tenderness.   Musculoskeletal:         General: Normal range of motion.      Cervical back: Neck supple.   Lymphadenopathy:      Cervical: No cervical adenopathy.   Skin:     General: Skin is warm and dry.   Neurological:      Mental Status: She is alert.   Psychiatric:         Mood and Affect: Mood normal.         Behavior: Behavior normal.         "

## 2024-12-04 DIAGNOSIS — G47.00 INSOMNIA, UNSPECIFIED TYPE: ICD-10-CM

## 2024-12-05 RX ORDER — TRAZODONE HYDROCHLORIDE 100 MG/1
200 TABLET ORAL
Qty: 60 TABLET | Refills: 5 | Status: SHIPPED | OUTPATIENT
Start: 2024-12-05

## 2024-12-14 DIAGNOSIS — F41.9 ANXIETY: ICD-10-CM

## 2024-12-16 RX ORDER — LORAZEPAM 2 MG/1
2 TABLET ORAL EVERY 6 HOURS PRN
Qty: 120 TABLET | Refills: 0 | Status: SHIPPED | OUTPATIENT
Start: 2024-12-16 | End: 2024-12-26 | Stop reason: SDUPTHER

## 2024-12-26 DIAGNOSIS — F41.9 ANXIETY: ICD-10-CM

## 2024-12-26 RX ORDER — LORAZEPAM 2 MG/1
2 TABLET ORAL EVERY 6 HOURS PRN
Qty: 120 TABLET | Refills: 0 | Status: SHIPPED | OUTPATIENT
Start: 2024-12-26

## 2024-12-26 NOTE — TELEPHONE ENCOUNTER
Patient called to request a refill for their lorazepam advised a refill was requested on 12/26/2024 and is pending approval. Patient verbalized understanding and is in agreement.

## 2024-12-27 PROBLEM — Z00.00 HEALTH MAINTENANCE EXAMINATION: Status: RESOLVED | Noted: 2020-02-25 | Resolved: 2024-12-27

## 2024-12-30 DIAGNOSIS — G47.00 INSOMNIA, UNSPECIFIED TYPE: ICD-10-CM

## 2024-12-30 DIAGNOSIS — F33.1 MODERATE EPISODE OF RECURRENT MAJOR DEPRESSIVE DISORDER (HCC): ICD-10-CM

## 2024-12-30 DIAGNOSIS — R11.0 NAUSEA: ICD-10-CM

## 2024-12-30 DIAGNOSIS — F41.9 ANXIETY: ICD-10-CM

## 2024-12-31 ENCOUNTER — TELEPHONE (OUTPATIENT)
Age: 49
End: 2024-12-31

## 2024-12-31 ENCOUNTER — TELEPHONE (OUTPATIENT)
Dept: FAMILY MEDICINE CLINIC | Facility: CLINIC | Age: 49
End: 2024-12-31

## 2024-12-31 LAB
ALBUMIN SERPL-MCNC: 4.5 G/DL (ref 3.5–5.7)
ALP SERPL-CCNC: 63 U/L (ref 35–120)
ALT SERPL-CCNC: 9 U/L
ANION GAP SERPL CALCULATED.3IONS-SCNC: 12 MMOL/L (ref 3–11)
AST SERPL-CCNC: 12 U/L
BASOPHILS # BLD AUTO: 0 THOU/CMM (ref 0–0.1)
BASOPHILS NFR BLD AUTO: 1 %
BILIRUB SERPL-MCNC: 0.5 MG/DL (ref 0.2–1)
BUN SERPL-MCNC: 22 MG/DL (ref 7–25)
CALCIUM SERPL-MCNC: 9.7 MG/DL (ref 8.5–10.5)
CHLORIDE SERPL-SCNC: 102 MMOL/L (ref 100–109)
CHOLEST SERPL-MCNC: 333 MG/DL
CHOLEST/HDLC SERPL: 4.9 {RATIO}
CO2 SERPL-SCNC: 26 MMOL/L (ref 21–31)
CREAT SERPL-MCNC: 1.23 MG/DL (ref 0.4–1.1)
CYTOLOGY CMNT CVX/VAG CYTO-IMP: ABNORMAL
DIFFERENTIAL METHOD BLD: ABNORMAL
EOSINOPHIL # BLD AUTO: 0.1 THOU/CMM (ref 0–0.5)
EOSINOPHIL NFR BLD AUTO: 2 %
ERYTHROCYTE [DISTWIDTH] IN BLOOD BY AUTOMATED COUNT: 12.5 % (ref 12–16)
GFR/BSA.PRED SERPLBLD CYS-BASED-ARV: 53 ML/MIN/{1.73_M2}
GLUCOSE SERPL-MCNC: 89 MG/DL (ref 65–99)
HCT VFR BLD AUTO: 43 % (ref 35–43)
HDLC SERPL-MCNC: 68 MG/DL (ref 23–92)
HGB BLD-MCNC: 14.9 G/DL (ref 11.5–14.5)
LDLC SERPL CALC-MCNC: 247 MG/DL
LYMPHOCYTES # BLD AUTO: 1.9 THOU/CMM (ref 1–3)
LYMPHOCYTES NFR BLD AUTO: 36 %
MCH RBC QN AUTO: 32.4 PG (ref 26–34)
MCHC RBC AUTO-ENTMCNC: 34.5 G/DL (ref 32–37)
MCV RBC AUTO: 94 FL (ref 80–100)
MONOCYTES # BLD AUTO: 0.4 THOU/CMM (ref 0.3–1)
MONOCYTES NFR BLD AUTO: 7 %
NEUTROPHILS # BLD AUTO: 2.9 THOU/CMM (ref 1.8–7.8)
NEUTROPHILS NFR BLD AUTO: 54 %
NONHDLC SERPL-MCNC: 265 MG/DL
PLATELET # BLD AUTO: 258 THOU/CMM (ref 140–350)
PMV BLD REES-ECKER: 8.4 FL (ref 7.5–11.3)
POTASSIUM SERPL-SCNC: 4.5 MMOL/L (ref 3.5–5.2)
PROT SERPL-MCNC: 7.2 G/DL (ref 6.3–8.3)
RBC # BLD AUTO: 4.59 MILL/CMM (ref 3.7–4.7)
SODIUM SERPL-SCNC: 140 MMOL/L (ref 135–145)
TRIGL SERPL-MCNC: 89 MG/DL
WBC # BLD AUTO: 5.4 THOU/CMM (ref 4–10)

## 2024-12-31 RX ORDER — ARIPIPRAZOLE 30 MG/1
30 TABLET ORAL DAILY
Qty: 30 TABLET | Refills: 5 | Status: SHIPPED | OUTPATIENT
Start: 2024-12-31

## 2024-12-31 RX ORDER — TRAZODONE HYDROCHLORIDE 100 MG/1
200 TABLET ORAL
Qty: 60 TABLET | Refills: 5 | Status: SHIPPED | OUTPATIENT
Start: 2024-12-31

## 2024-12-31 RX ORDER — SERTRALINE HYDROCHLORIDE 100 MG/1
100 TABLET, FILM COATED ORAL DAILY
Qty: 30 TABLET | Refills: 5 | Status: SHIPPED | OUTPATIENT
Start: 2024-12-31

## 2024-12-31 RX ORDER — ONDANSETRON 4 MG/1
4 TABLET, FILM COATED ORAL EVERY 8 HOURS PRN
Qty: 20 TABLET | Refills: 0 | Status: SHIPPED | OUTPATIENT
Start: 2024-12-31

## 2024-12-31 NOTE — TELEPHONE ENCOUNTER
Jemima calling back from [facility] stating that the lab orders were not sent, it was only the script information.    I faxed the lab orders to her.  No further questions/concerns.

## 2024-12-31 NOTE — TELEPHONE ENCOUNTER
Jemima call in from [facility] lab stating that lab orders where to be faxed in to the office, thy're still not set in. Call transferred to the office.

## 2025-01-04 DIAGNOSIS — N28.9 RENAL INSUFFICIENCY: Primary | ICD-10-CM

## 2025-01-06 ENCOUNTER — RESULTS FOLLOW-UP (OUTPATIENT)
Dept: FAMILY MEDICINE CLINIC | Facility: CLINIC | Age: 50
End: 2025-01-06

## 2025-01-06 DIAGNOSIS — E05.90 HYPERTHYROIDISM: Primary | ICD-10-CM

## 2025-01-06 DIAGNOSIS — N28.9 RENAL INSUFFICIENCY: ICD-10-CM

## 2025-01-15 ENCOUNTER — TELEPHONE (OUTPATIENT)
Age: 50
End: 2025-01-15

## 2025-01-15 NOTE — TELEPHONE ENCOUNTER
Patient called,     Requesting Lab scripts to be mailed to patients home. Patient states she hasn't receive in the mail. Would like scripts resent please.     Pt would like Dr. Gibbs to add script for pregnancy test. Patient states she has symptoms of being pregnant, symptoms: nausea, increased appetite, and weight gain.     Please Mail lab scripts from 1/4/2025, 1/6/2025    Please Advise and notify, thank you.    Love Rosario   [address] [place] [address]

## 2025-01-18 DIAGNOSIS — R11.0 NAUSEA: Primary | ICD-10-CM

## 2025-01-20 NOTE — TELEPHONE ENCOUNTER
Pt called in returning missed call. Wanted to know what it was about?    Relayed the following messages to pt:  Luke Gibbs MD to Denisse Pressley     1/18/25 12:00 PM  Pregnancy test ordered.  Other labs previously ordered    Whitney COATES    1/20/25 10:00 AM  Note     Labs printed off. Mailing out today. Pt notified.         Pt understood.

## 2025-02-06 DIAGNOSIS — F41.9 ANXIETY: ICD-10-CM

## 2025-02-06 RX ORDER — LORAZEPAM 2 MG/1
2 TABLET ORAL EVERY 6 HOURS PRN
Qty: 120 TABLET | Refills: 3 | Status: SHIPPED | OUTPATIENT
Start: 2025-02-06

## 2025-02-10 ENCOUNTER — RESULTS FOLLOW-UP (OUTPATIENT)
Dept: FAMILY MEDICINE CLINIC | Facility: CLINIC | Age: 50
End: 2025-02-10

## 2025-02-10 ENCOUNTER — APPOINTMENT (OUTPATIENT)
Dept: LAB | Facility: HOSPITAL | Age: 50
End: 2025-02-10

## 2025-02-10 DIAGNOSIS — N28.9 RENAL INSUFFICIENCY: ICD-10-CM

## 2025-02-10 DIAGNOSIS — R11.0 NAUSEA: ICD-10-CM

## 2025-02-10 DIAGNOSIS — N91.2 AMENORRHEA: Primary | ICD-10-CM

## 2025-02-10 DIAGNOSIS — E05.90 HYPERTHYROIDISM: ICD-10-CM

## 2025-02-10 LAB
ANION GAP SERPL CALCULATED.3IONS-SCNC: 9 MMOL/L (ref 4–13)
BACTERIA UR QL AUTO: NORMAL /HPF
BILIRUB UR QL STRIP: NEGATIVE
BUN SERPL-MCNC: 18 MG/DL (ref 5–25)
CALCIUM SERPL-MCNC: 9.7 MG/DL (ref 8.4–10.2)
CHLORIDE SERPL-SCNC: 104 MMOL/L (ref 96–108)
CLARITY UR: CLEAR
CO2 SERPL-SCNC: 28 MMOL/L (ref 21–32)
COLOR UR: COLORLESS
CREAT SERPL-MCNC: 1.17 MG/DL (ref 0.6–1.3)
GFR SERPL CREATININE-BSD FRML MDRD: 54 ML/MIN/1.73SQ M
GLUCOSE P FAST SERPL-MCNC: 101 MG/DL (ref 65–99)
GLUCOSE UR STRIP-MCNC: NEGATIVE MG/DL
HCG SERPL QL: ABNORMAL
HGB UR QL STRIP.AUTO: NEGATIVE
KETONES UR STRIP-MCNC: NEGATIVE MG/DL
LEUKOCYTE ESTERASE UR QL STRIP: NEGATIVE
NITRITE UR QL STRIP: NEGATIVE
NON-SQ EPI CELLS URNS QL MICRO: NORMAL /HPF
PH UR STRIP.AUTO: 6.5 [PH]
POTASSIUM SERPL-SCNC: 4 MMOL/L (ref 3.5–5.3)
PROT UR STRIP-MCNC: NEGATIVE MG/DL
RBC #/AREA URNS AUTO: NORMAL /HPF
SODIUM SERPL-SCNC: 141 MMOL/L (ref 135–147)
SP GR UR STRIP.AUTO: 1.01 (ref 1–1.03)
T4 FREE SERPL-MCNC: 0.66 NG/DL (ref 0.61–1.12)
TSH SERPL DL<=0.05 MIU/L-ACNC: 2.5 UIU/ML (ref 0.45–4.5)
UROBILINOGEN UR STRIP-ACNC: <2 MG/DL
WBC #/AREA URNS AUTO: NORMAL /HPF

## 2025-02-10 PROCEDURE — 81001 URINALYSIS AUTO W/SCOPE: CPT

## 2025-02-10 PROCEDURE — 80048 BASIC METABOLIC PNL TOTAL CA: CPT

## 2025-02-10 PROCEDURE — 36415 COLL VENOUS BLD VENIPUNCTURE: CPT

## 2025-02-10 PROCEDURE — 84703 CHORIONIC GONADOTROPIN ASSAY: CPT

## 2025-02-10 PROCEDURE — 84439 ASSAY OF FREE THYROXINE: CPT

## 2025-02-10 PROCEDURE — 84443 ASSAY THYROID STIM HORMONE: CPT

## 2025-02-24 NOTE — TELEPHONE ENCOUNTER
Pt called asking about her lab results from 2/10. Relayed message of Dr Gibbs to have the Pregnancy test repeated. She states she will go have that done today. No further questions.

## 2025-02-25 DIAGNOSIS — F41.9 ANXIETY: ICD-10-CM

## 2025-02-25 DIAGNOSIS — F33.1 MODERATE EPISODE OF RECURRENT MAJOR DEPRESSIVE DISORDER (HCC): ICD-10-CM

## 2025-02-25 RX ORDER — ARIPIPRAZOLE 30 MG/1
30 TABLET ORAL DAILY
Qty: 30 TABLET | Refills: 5 | Status: SHIPPED | OUTPATIENT
Start: 2025-02-25

## 2025-02-25 RX ORDER — LORAZEPAM 2 MG/1
2 TABLET ORAL EVERY 6 HOURS PRN
Qty: 120 TABLET | Refills: 0 | Status: SHIPPED | OUTPATIENT
Start: 2025-02-25

## 2025-02-28 ENCOUNTER — TELEPHONE (OUTPATIENT)
Age: 50
End: 2025-02-28

## 2025-02-28 NOTE — TELEPHONE ENCOUNTER
Patient called to schedule NP appt for MS-  She is now scheduled 4/9/25 @ 11am with Dr Vargas in [place].    Aware of information in MyChart  Also suggested bringing any documentation she has with her to visit

## 2025-03-31 ENCOUNTER — TELEPHONE (OUTPATIENT)
Age: 50
End: 2025-03-31

## 2025-03-31 DIAGNOSIS — F33.1 MODERATE EPISODE OF RECURRENT MAJOR DEPRESSIVE DISORDER (HCC): ICD-10-CM

## 2025-03-31 DIAGNOSIS — F41.9 ANXIETY: ICD-10-CM

## 2025-03-31 DIAGNOSIS — G47.00 INSOMNIA, UNSPECIFIED TYPE: ICD-10-CM

## 2025-03-31 DIAGNOSIS — N30.00 ACUTE CYSTITIS WITHOUT HEMATURIA: Primary | ICD-10-CM

## 2025-03-31 RX ORDER — NITROFURANTOIN 25; 75 MG/1; MG/1
100 CAPSULE ORAL 2 TIMES DAILY
Qty: 10 CAPSULE | Refills: 0 | Status: SHIPPED | OUTPATIENT
Start: 2025-03-31 | End: 2025-04-05

## 2025-03-31 NOTE — TELEPHONE ENCOUNTER
Patient called stating she has a UTI.     Burning sensation, frequency    Patient says because she is having a flair up of her MS she is not able to come into the office of go to the lab for a urine sample.     Asking if Dr. Gibbs would send over a script     Please notify patient     [facility]

## 2025-04-01 RX ORDER — ARIPIPRAZOLE 30 MG/1
30 TABLET ORAL DAILY
Qty: 30 TABLET | Refills: 0 | Status: SHIPPED | OUTPATIENT
Start: 2025-04-01

## 2025-04-01 RX ORDER — TRAZODONE HYDROCHLORIDE 100 MG/1
200 TABLET ORAL
Qty: 60 TABLET | Refills: 0 | OUTPATIENT
Start: 2025-04-01

## 2025-04-01 RX ORDER — LORAZEPAM 2 MG/1
2 TABLET ORAL EVERY 6 HOURS PRN
Qty: 120 TABLET | Refills: 0 | Status: SHIPPED | OUTPATIENT
Start: 2025-04-01

## 2025-04-07 DIAGNOSIS — R11.0 NAUSEA: ICD-10-CM

## 2025-04-08 RX ORDER — ONDANSETRON 4 MG/1
4 TABLET, FILM COATED ORAL EVERY 8 HOURS PRN
Qty: 20 TABLET | Refills: 0 | Status: SHIPPED | OUTPATIENT
Start: 2025-04-08

## 2025-04-09 ENCOUNTER — TELEPHONE (OUTPATIENT)
Age: 50
End: 2025-04-09

## 2025-04-09 ENCOUNTER — OFFICE VISIT (OUTPATIENT)
Age: 50
End: 2025-04-09
Payer: COMMERCIAL

## 2025-04-09 VITALS
DIASTOLIC BLOOD PRESSURE: 70 MMHG | OXYGEN SATURATION: 95 % | HEIGHT: 55 IN | WEIGHT: 121 LBS | BODY MASS INDEX: 28 KG/M2 | HEART RATE: 102 BPM | SYSTOLIC BLOOD PRESSURE: 118 MMHG

## 2025-04-09 DIAGNOSIS — G35 MULTIPLE SCLEROSIS (HCC): Primary | ICD-10-CM

## 2025-04-09 DIAGNOSIS — R53.83 FATIGUE: ICD-10-CM

## 2025-04-09 DIAGNOSIS — G20.A1 PARKINSON DISEASE (HCC): ICD-10-CM

## 2025-04-09 PROCEDURE — 99205 OFFICE O/P NEW HI 60 MIN: CPT | Performed by: PSYCHIATRY & NEUROLOGY

## 2025-04-09 RX ORDER — MODAFINIL 100 MG/1
100 TABLET ORAL DAILY
Qty: 30 TABLET | Refills: 0 | Status: SHIPPED | OUTPATIENT
Start: 2025-04-09

## 2025-04-09 RX ORDER — CARBIDOPA AND LEVODOPA 25; 100 MG/1; MG/1
TABLET ORAL
Qty: 60 TABLET | Refills: 3 | Status: SHIPPED | OUTPATIENT
Start: 2025-04-09

## 2025-04-09 NOTE — PROGRESS NOTES
Neurology New Patient Ambulatory Visit Note    Name: Love Rosario       : 1975       MRN: [id number]   Encounter Provider: Hammad Gutierrez MD   Encounter Date: 2025  Encounter department: [facility] NEUROLOGY [facility]    History of Present Illness     Love Rosario is a 49 y.o. female presenting with Multiple Sclerosis      MS HISTORY:  Symptoms onset: more than 15 years back  Initial symptoms: bilateral ON  MS diagnosis: 15 years back--  Disease course at onset: Relapsing remitting Multiple Sclerosis   Current disease course: Secondary progressive MS --based on the clinical exam today  Subsequent symptoms: visual symptoms including 2 other episodes of ON, new onset of tremor in her hands and leg weakness.   Family history of MS: Yes, sister has MS  Spinal tap: positive as per the patient   Prior DMT's for MS: Rebif for a couple of months    JCV test:  not done   NMO Ab: not done   MOG Ab: not done     Last MRI of the brain:  no prior neuroimaging   Last MRI of the C spine: no prior neuroimaging   Last MRI of the T spine:no prior neuroimaging     Diagnosed with multiple sclerosis approximately fifteen years ago, initially presenting with bilateral optic neuritis. Experienced sudden loss of vision in both eyes, which improved with Solu-Medrol treatment, though some residual visual deficits remain. Has had optic neuritis three times, with the second episode occurring about ten years ago, also treated with steroids.    Currently experiencing significant weakness in the legs, making walking difficult, with more pronounced weakness in the knees and above, leading to leg dragging with prolonged walking. Reports recent onset of tremors in the hands, slurred speech, and worsening memory issues, all beginning about a month and a half ago. Experiences vertigo with a sensation of the room spinning, balance issues, and fatigue. Has a history of urinary incontinence managed with oxybutynin and  "numbness managed with gabapentin.    Initially treated with Rebif injections but discontinued after a few months due to intolerance. Has not been on any other disease-modifying therapies since then. No recent MRIs or follow-up with a neurologist since stopping Rebif due to lack of health insurance, delaying further diagnostic workup and treatment.    Has not had any recent imaging studies or spinal taps since the initial diagnosis, which included a positive spinal tap. No recent MRIs due to lack of health insurance.    Her sister also has multiple sclerosis. No family history of Parkinson's disease or dementia.      Objective   /70 (BP Location: Left arm, Patient Position: Sitting, Cuff Size: Extra-Large)   Pulse 102   Ht 4' 7\" (1.397 m)   Wt 54.9 kg (121 lb)   LMP  (LMP Unknown)   SpO2 95%   BMI 28.12 kg/m²        Neurological examination:     Mental status exam: Alert-- mask like facies      Cranial nerve exam:    I Not tested   II Normal visual fields to finger counting.   II, Ill,  IV, VI Pupils were symmetric, briskly reactive. No afferent pupillary defect.  Eye movements are full without nystagmus. No ptosis.   V Facial sensation were intact   VII Facial movements were symmetrical    VIII Hearing was intact   IX, X Intact   XI Shoulder shrug and head turn was normal   XII Tongue protrusion is in the mid line.          Motor exam      Arm Right  Left Leg Right  Left   Deltoid 4+/5 4+/5 lliopsoas 5/5 4+/5   Biceps 5/5 5/5 Quads 5/5 4+/5   Triceps 5/5 5/5 Hamstrings 5/5 4+/5   Wrist Extension 5/5 5/5 Ankle Dorsi Flexion 5/5 4+/5   Wrist Flexion 5/5 5/5 Ankle Plantar Flexion 5/5 4+/5            Deep Tendon reflexes:       Brachioradialis Biceps Triceps Patellar Achilles   Right 3+ 3+ 3+ 3+ 3+   Left 3+ 3+ 3+ 3+ 3+            Sensory exam:  Decreased cold and sharp sensation in right hand,     Coordination:  Bilateral resting tremor in hands, Stooped posture, Cogwheel rigidity in left hand, Mask-like " "facies, Slight head tremor    Gait and Station:    Stooped posture         Pertinent diagnostic testing:  Labs:  Lab Results   Component Value Date    WBC 5.4 12/31/2024     Lab Results   Component Value Date    HGB 14.9 (H) 12/31/2024     Lab Results   Component Value Date     12/31/2024     Lab Results   Component Value Date    LYMPHSABS 1.9 12/31/2024     No results found for: \"LABLYMP\"  Lab Results   Component Value Date    EOSABS 0.1 12/31/2024     No components found for: \"NEUTROPHILS\"    No results found for: \"LABMONO\"         No results found for: \"LABGLUC\"  Lab Results   Component Value Date    CREATININE 1.17 02/10/2025       Lab Results   Component Value Date    AST 12 12/31/2024    AST 12 12/31/2024     Lab Results   Component Value Date    ALT 9 12/31/2024    ALT 9 12/31/2024     Lab Results   Component Value Date    ALKPHOS 63 12/31/2024    ALKPHOS 63 12/31/2024     Lab Results   Component Value Date    AST 12 12/31/2024    AST 12 12/31/2024        No results found for: \"FOLATE\"  No results found for: \"ADLQXWNT24\"  No results found for: \"COPPER\"  No results found for: \"METHYLMALON\"  No results found for: \"VITAMINB6\"  No components found for: \"DLUINMOU2BJ\"  No components found for: \"VITAMINE\"  No components found for: \"ANADIRECT\"     No components found for: \"HIVPCR\"     No components found for: \"GLUCOSECSF\"  No components found for: \"CSFINTERP\"  No results found for: \"RBCCSF\"  No results found for: \"WBCCSF\"  No results found for: \"IGGSYNRATE\"  No components found for: \"IGGINDEX\"  No results found for: \"PROTEINCSF\"  No components found for: \"CSFMONO\"  No components found for: \"FUNGUSCX\"  No components found for: \"CSFCS\"  No results found for: \"CRYPTOAG\"  No components found for: \"DRLCSF\"  No components found for: \"FLOWCYTEVAL\"     Lab Results   Component Value Date    TSH 1.60 04/15/2022    HEPCAB NEGATIVE 04/15/2022            Neuroimaging:                              No new neuroimaging done " in the system.               Assessment & Plan  Multiple sclerosis (HCC)  Ms. Rosario is a very pleasant 49 y.o. female presenting with MS. Diagnosed 15 years ago following episodes of bilateral optic neuritis. Current symptoms include leg weakness, vertigo, memory problems, speech disturbance, incontinence, fatigue, and resting tremor. Differential diagnosis includes Neuromyelitis Optica (NMO) and other MS-like conditions due to bilateral optic neuritis. Further diagnostic workup is needed to confirm MS versus other conditions. Discussed potential for free medication from pharmaceutical companies once diagnosis is confirmed. Explained that MRIs and lab work are costly and will be ordered once insurance is obtained to avoid financial burden. Bilateral leg weakness, more pronounced on the left side. Associated with MS but further evaluation needed to confirm diagnosis and rule out other conditions. Discussed potential use of steroids if diagnosis is confirmed, but emphasized the need for accurate diagnosis before treatment.      - Order MRIs and lab work once insurance is obtained to confirm diagnosis and rule out NMO and other conditions.  - Provide information on diet, exercise, and supplements.  - Coordinate with  for assistance with insurance and medication access.      Orders:    CBC and differential; Future    Comprehensive metabolic panel; Future    TSH, 3rd generation with Free T4 reflex; Future    Folate; Future    Vitamin B12; Future    Vitamin D 25 hydroxy; Future    Hepatitis C antibody; Future    Hepatitis B surface antibody; Future    Hepatitis B surface antigen; Future    STRATIFY JCV(TM) AB W/RFX TO INHIBITION ASSAY; Future    RPR-Syphilis Screening (Total Syphilis IGG/IGM); Future    Quantiferon TB Gold Plus Assay; Future    HIV 1/2 AG/AB w Reflex [facility] for 2 yr old and above; Future    MRI brain w wo contrast; Future    MRI cervical spine w wo contrast; Future    MRI thoracic spine w wo  contrast; Future    Lyme Total AB W Reflex to IGM/IGG; Future      MISCELLANEOUS LAB TEST; Future    MISCELLANEOUS LAB TEST; Future    Ambulatory referral to Physical Therapy; Future    Parkinson disease (HCC)  Recent onset of bilateral resting tremor, more pronounced on the left side. Possible early onset Parkinson's disease due to specific tremor characteristics. Further evaluation needed. Discussed starting Sinemet to manage tremor, with caution regarding potential hypotensive effects.  - Prescribe Sinemet 25/100 mg, starting with one tablet in the evening for one week, then increase to one in the morning and one in the evening if needed.  - Monitor blood pressure due to potential hypotensive effects of Sinemet.  - Follow up in 2-3 weeks to assess response to Sinemet.             Ms. Rosario will Return in about 3 months (around 7/9/2025), or 60 min follow up please put in a new patient place pelase..  Emphasized the importance of obtaining insurance to proceed with diagnostic workup and treatment.  - Schedule follow-up appointment in 3 months.  - Coordinate with  for insurance and medication access.  - Provide referral to physical therapy in [place].  - Offer option for televideo follow-up if needed.  Administrative Statements   The management plan was discussed in detail with the patient and all questions were answered.     Ms. Rosario was encouraged to contact our office with any questions or concerns and to contact the clinic or go to the nearest emergency room if symptoms change or worsen.     I have spent a total time of 62 minutes in caring for this patient on the day of the visit/encounter including Diagnostic results, Prognosis, Risks and benefits of tx options, Instructions for management, Patient and family education, Importance of tx compliance, Risk factor reductions, Impressions, Counseling / Coordination of care, Documenting in the medical record, Reviewing/placing orders in the  medical record (including tests, medications, and/or procedures), and Obtaining or reviewing history  .         Hammad Vargas MD.   Staff Neurologist,   Neuroimmunology and Neuroinfectious disease  04/09/25     This report has been created through the use of voice recognition/text compilation software.  Typographical and content errors may occur with this process.  While efforts are made to detect and correct such errors, in some cases errors will persist.  For this reason, wording in this document should be considered in the proper context and not strictly verbatim.  If, when reviewing the document, an error is discovered, please let the office know at [phone]

## 2025-04-09 NOTE — ASSESSMENT & PLAN NOTE
Ms. Rosario is a very pleasant 49 y.o. female presenting with MS. Diagnosed 15 years ago following episodes of bilateral optic neuritis. Current symptoms include leg weakness, vertigo, memory problems, speech disturbance, incontinence, fatigue, and resting tremor. Differential diagnosis includes Neuromyelitis Optica (NMO) and other MS-like conditions due to bilateral optic neuritis. Further diagnostic workup is needed to confirm MS versus other conditions. Discussed potential for free medication from pharmaceutical companies once diagnosis is confirmed. Explained that MRIs and lab work are costly and will be ordered once insurance is obtained to avoid financial burden. Bilateral leg weakness, more pronounced on the left side. Associated with MS but further evaluation needed to confirm diagnosis and rule out other conditions. Discussed potential use of steroids if diagnosis is confirmed, but emphasized the need for accurate diagnosis before treatment.      - Order MRIs and lab work once insurance is obtained to confirm diagnosis and rule out NMO and other conditions.  - Provide information on diet, exercise, and supplements.  - Coordinate with  for assistance with insurance and medication access.      Orders:    CBC and differential; Future    Comprehensive metabolic panel; Future    TSH, 3rd generation with Free T4 reflex; Future    Folate; Future    Vitamin B12; Future    Vitamin D 25 hydroxy; Future    Hepatitis C antibody; Future    Hepatitis B surface antibody; Future    Hepatitis B surface antigen; Future    STRATIFY JCV(TM) AB W/RFX TO INHIBITION ASSAY; Future    RPR-Syphilis Screening (Total Syphilis IGG/IGM); Future    Quantiferon TB Gold Plus Assay; Future    HIV 1/2 AG/AB w Reflex [facility] for 2 yr old and above; Future    MRI brain w wo contrast; Future    MRI cervical spine w wo contrast; Future    MRI thoracic spine w wo contrast; Future    Lyme Total AB W Reflex to IGM/IGG; Future       MISCELLANEOUS LAB TEST; Future    MISCELLANEOUS LAB TEST; Future    Ambulatory referral to Physical Therapy; Future

## 2025-04-09 NOTE — TELEPHONE ENCOUNTER
Patient has requested that Social Security Forms be completed by Dr. Vargas. Blank forms have been scanned into media and Fee has been applied to patient for $30.

## 2025-04-09 NOTE — TELEPHONE ENCOUNTER
Thank you Michelle,     I have sent in the prescription for Provigil.     Thank you,     Dr. Sam MD.   04/09/25   3:02 PM

## 2025-04-09 NOTE — PATIENT INSTRUCTIONS
"Mediterranean diet -- There is no single definition of a Mediterranean diet, but such diets are typically high in fruits, vegetables, whole grains, beans, nuts, and seeds; include olive oil as an important source of monounsaturated fat; and allow low to moderate wine consumption. There are typically low to moderate amounts of fish, poultry, and dairy products, with little red meat. The Mediterranean diet is associated with several health benefits; however, it remains uncertain which components of the Mediterranean diet offer the protective benefit or if the benefits result from an aggregation of effects.    The Mediterranean diet incorporates the basics of healthy eating -- plus a splash of flavorful olive oil and perhaps a glass of red wine -- among other components characterizing the traditional cooking style of countries bordering the [place].  Most healthy diets include fruits, vegetables, fish and whole grains, and limit unhealthy fats. While these parts of a healthy diet are tried-and-true, subtle variations or differences in proportions of certain foods may make a difference in your risk of heart disease.     Benefits of the Mediterranean diet  Research has shown that the traditional Mediterranean diet reduces the risk of heart disease. The diet has been associated with a lower level of oxidized low-density lipoprotein (LDL) cholesterol -- the \"bad\" cholesterol that's more likely to build up deposits in your arteries.  In fact, a meta-analysis of more than 1.5 million healthy adults demonstrated that following a Mediterranean diet was associated with a reduced risk of cardiovascular mortality as well as overall mortality.  The Mediterranean diet is also associated with a reduced incidence of cancer, and Parkinson's and Alzheimer's diseases. Women who eat a Mediterranean diet supplemented with extra-virgin olive oil and mixed nuts may have a reduced risk of breast cancer.  For these reasons, most if " not all major scientific organizations encourage healthy adults to adapt a style of eating like that of the Mediterranean diet for prevention of major chronic diseases.     Key components of the Mediterranean diet  The Mediterranean diet emphasizes:  Eating primarily plant-based foods, such as fruits and vegetables, whole grains, legumes and nuts   Replacing butter with healthy fats such as olive oil and canola oil   Using herbs and spices instead of salt to flavor foods   Limiting red meat to no more than a few times a month   Eating fish and poultry at least twice a week   Enjoying meals with family and friends   Drinking red wine in moderation (optional)   Getting plenty of exercise      If you're looking for a heart-healthy eating plan, the Mediterranean diet might be right for you. The Mediterranean diet incorporates the basics of healthy eating -- plus a splash of flavorful olive oil and perhaps even a glass of red wine -- among other components characterizing the traditional cooking style of countries bordering the [place].  Most healthy diets include fruits, vegetables, fish and whole grains, and limit unhealthy fats. While these parts of a healthy diet remain tried-and-true, subtle variations or differences in proportions of certain foods may make a difference in your risk of heart disease.      Research has shown that the traditional Mediterranean diet reduces the risk of heart disease. In fact, an analysis of more than 1.5 million healthy adults demonstrated that following a Mediterranean diet was associated with a reduced risk of death from heart disease and cancer, as well as a reduced incidence of Parkinson's and Alzheimer's diseases.      The Dietary Guidelines for Americans recommends the Mediterranean diet as an eating plan that can help promote health and prevent disease. And the Mediterranean diet is one your whole family can follow for good health.   The Mediterranean diet  "emphasizes:  Eating primarily plant-based foods, such as fruits and vegetables, whole grains,  legumes and nuts  Replacing butter with healthy fats, such as olive oil  Using herbs and spices instead of salt to flavor foods  Limiting red meat to no more than a few times a month  Eating fish and poultry at least twice a week  Drinking red wine in moderation (optional)     The diet also recognizes the importance of being physically active, and enjoying meals with family and friends.     The Mediterranean diet traditionally includes fruits, vegetables and grains. For example, residents of [facility] average six or more servings a day of antioxidant rich fruits and vegetables.     Grains in the Mediterranean region are typically whole grain and usually contain very few unhealthy trans fats, and bread is an important part of the diet. However, throughout the Mediterranean region, bread is eaten plain or dipped in olive oil -- not eaten with butter or margarine, which contains saturated or trans fats.      Nuts are another part of a healthy Mediterranean diet. Nuts are high in fat, but most of the fat is healthy. Because nuts are high in calories, they should not be eaten in large amounts -- generally no more than a handful a day. For the best nutrition, avoid candied or honey-roasted and heavily salted nuts.  The focus of the Mediterranean diet isn't on limiting total fat consumption, but rather on choosing healthier types of fat. The Mediterranean diet discourages saturated fats and hydrogenated oils (trans fats), both of which contribute to heart disease.  The Mediterranean diet features olive oil as the primary source of fat. Olive oil is mainly monounsaturated fat -- a type of fat that can help reduce low-density lipoprotein (LDL) cholesterol levels when used in place of saturated or trans fats. \"Extra-virgin\" and \"virgin\" olive oils (the least processed forms) also contain the highest levels of protective plant compounds " that provide antioxidant effects.  Canola oil and some nuts contain the beneficial linolenic acid (a type of omega-3 fatty acid) in addition to healthy unsaturated fat. Omega-3 fatty acids lower triglycerides, decrease blood clotting, and are associated with decreased incidence of sudden heart attacks, improve the health of your blood vessels, and help moderate blood pressure.      Fatty fish -- such as mackerel, lake trout, herring, sardines, albacore tuna and salmon -- are rich sources of omega-3 fatty acids. Fish is eaten on a regular basis in the Mediterranean diet.     The health effects of alcohol have been debated for many years, and some doctors are reluctant to encourage alcohol consumption because of the health consequences of excessive drinking. However, alcohol -- in moderation -- has been associated with a reduced risk of heart disease in some research studies.  The Mediterranean diet typically includes a moderate amount of wine, usually red wine. This means no more than 5 ounces (148 milliliters) of wine daily for women of all ages and men older than age 65 and no more than 10 ounces (296 milliliters) of wine daily for younger men. More than this may increase the risk of health problems, including increased risk of certain types of cancer.   If you're unable to limit your alcohol intake to the amounts defined above, if you have a personal or family history of alcohol abuse, or if you have heart or liver disease, refrain from drinking wine or any other alcohol.     The Mediterranean diet is a delicious and healthy way to eat. Many people who switch to this style of eating say they'll never eat any other way. Here are some specific steps to get you started:   Eat your veggies and fruits -- and switch to whole grains. [place] of plant foods should make up the majority of your meals. They should be minimally processed -- fresh and whole are best. Include veggies and fruits in every meal and eat them for  snacks as well. Switch to whole-grain bread and cereal, and begin to eat more whole-grain rice and pasta products. Keep baby carrots, apples and bananas on hand for quick, satisfying snacks. Fruit salads are a wonderful way to eat a variety of healthy fruit.  Go nuts. Nuts and seeds are good sources of fiber, protein and healthy fats. Keep almonds, cashews, pistachios and walnuts on hand for a quick snack. Choose natural peanut butter, rather than the kind with hydrogenated fat added. Try blended sesame seeds (tahini) as a dip or spread for bread.  Pass on the butter. Try olive or canola oil as a healthy replacement for butter or margarine. Lightly drizzle it over vegetables. After cooking pasta, add a touch of olive oil, some garlic and green onions for flavoring. Dip bread in flavored olive oil or lightly spread it on whole-grain bread for a tasty alternative to butter. Try tahini as a dip or spread for bread too.  Spice it up. Herbs and spices make food tasty and can  for salt and fat in recipes.  Go fish. Eat fish at least twice a week. Fresh or water-packed tuna, salmon, trout, mackerel and herring are healthy choices. [place], bake or broil fish for great taste and easy cleanup. Avoid breaded and fried fish.  Rein in the red meat. Limit red meat to no more than a few times a month. Substitute fish and poultry for red meat. When choosing red meat, make sure it's lean and keep portions small (about the size of a deck of cards). Also avoid sausage, ybarra and other high-fat, processed meats.  Choose low-fat dairy. Limit higher fat dairy products, such as whole or 2 percent milk, cheese and ice cream. Switch to skim milk, fat-free yogurt and low-fat cheese.     Many patients ask us about exercise and MS. Some common questions include: “should I exercise?”, “how often, for how long, how strenuous?”, and “what should I do?”. The answers are different for everybody however for the most part, exercise is good for  people with MS.    A 2010 study in [place] showed that fatigue, mood and quality of life improved after 12 weeks of progressive resistance training. This benefit was maintained for an additional 12 weeks after the end of their training. [1]    Another study at [facility] in 2004 randomized MS patients into 3 groups: one that did Yumi yoga, one that worked out on a stationary bike, or a control group. The yoga group showed a significant improvement in fatigue compared to controls. [2]    Everybody is different so the type of exercise you do, how often you do it and how intense it is should be tailored to you and based on your abilities. Often times we make referrals to physical therapy where an individualized program can be developed to focus on each person's needs such as walking, managing spasticity, or fatigue. It's important to stick with a program as often the results are not felt until a few weeks into the program.     Many MS patients have worsening of their symptoms during the summer or when it's hot outside. This intolerance to heat is called Uhthoff's Phenomena. For these patients it's recommended to exercise in the morning or in the evening when the sun is not at its peak, or inside in an air conditioned environment. Aquatic exercise such as water aerobics or swimming are beneficial in patients with MS. If exercising in the heat is the only option, there are special clothes designed to keep the body cool including cooling neck wraps and vests. If all of these measures still don't work and symptoms are worse during exercise, it's recommended to discuss this with your neurologist.   Some patients say they are too tired to work out. Fatigue is an extremely common symptom in MS with up to 80% of patients experiencing it. A number of studies have shown that regular exercise, usually with some aerobic component, helps with MS-related fatigue. [3] If you feel too tired to work out you  "could try a restorative or yin yoga class. Yin classes tend to rebuild your energy levels and calm the nervous system. These two types of classes involve mostly stretching and relaxing. If you are new to yoga, it might be best to take a few private classes or go to a local yoga studio. However there are also some great resources online including yogaglo.com and yogavibes.com which both offer a 15-day free trial period to watch yoga videos at home. On these sites you can choose a beginner level 1 class or the style and teacher of your choice. If yoga is not your thing, even going for a short walk every day can improve your energy levels and overall health.     Next time you visit with your neurologist, be sure to discuss exercise and how you can incorporate it into your life.     References:  1. REGINA Cota, et al. \"Fatigue, mood and quality of life improve in MS patients after progressive resistance training.\" Multiple sclerosis (2010).  2. RAVINDER Lopez, et al. \"Randomized controlled trial of yoga and exercise in multiple sclerosis.\" Neurology 62.11 (2004): 2597-3166.  3. http://[email or web address]/neurological_institute/[place]-multiple-sclerosis/patient-education/hic-fatigue-in-multiple-sclerosis.aspx]       Vit D 125 mcg (5000 international units) every day.   Vitamin B 12 1000 mcg every day.   Vitamin B 1 100 mg every day.   Biotin 10,000 mcg every day.   Alpha lipoic acid 600 mg every day  Fish oil/Omega 3-one capsule every day.   Coq-10 100 mg every day.         If you are not able to take all of these Vitamins and supplements daily, you can try to take the first 4 daily.          These symptoms are NOT suggestive for relapses (exaecerbations or flare-ups): headache, stomach pain, nausea, vomiting, sore throat, joint pain, muscle pain, rash, cough, back pain, flu like symptoms.      These symptoms can be MS relapses. Please note they are all neurological symptoms:    - Numbness and tingling affecting a " part of the body and last continuously for days or weeks (on and off symptoms are not suggestive for MS relapses).       - Muscle weakness in arms (more so in legs) that lasts continuously for days or weeks. Short-lasting symptoms and on and off symptoms are not suggestive for an MS relapse.     - Face numbness and weakness that last continuously for days or weeks. Short-lasting, intermittent symptoms or face or eye twitching are not MS relapses.       - Spinning sensation (vertigo), that lasts continuously for days or weeks. Lightheadedness, positional dizziness and short-lasting dizziness are not MS symptoms.     - Imbalance while walking or incoordination in hands and arms that lasts continuously for days or weeks.    - Major, new onset loss of vision (or major blurry vision) in ONE eye (not both eyes), that happen relatively suddenly and worsen over few days and is usually associated with eye pain (with eye movement) can be an MS relapse. Short-lasting change in vision or very long change in vision that can be corrected with glasses ARE NOT MS symptoms.     - Double vision while both eyes are open, which goes away with closing one of the eyes and last days or weeks; can be a symptoms of MS relapse. Momentary double vision, or double vision which still is present with closing either eye; IS NOT an MS symptom.

## 2025-04-09 NOTE — TELEPHONE ENCOUNTER
Pt called. She had an OV with Dr. Vargas today. She thought that another medication was supposed to be prescribed in addition to the carbidopa-levodopa. States that the other med was for leg weakness. Routed to provider to advise.  CB# [phone] - may leave a detailed message on voice mail.

## 2025-04-11 ENCOUNTER — TELEPHONE (OUTPATIENT)
Age: 50
End: 2025-04-11

## 2025-04-11 NOTE — TELEPHONE ENCOUNTER
Pt called to request her medical records be sent to [facility] office. Advised pt that she needs to fill out a medical record request form. Can request via [employer] or can come to one of  office to fill one out or fd can mail the form. Pt verbalized understanding. Requesting medical record request form be mailed to address on file.    Clerical,   Can you pls mail a medical record request form?

## 2025-04-16 ENCOUNTER — APPOINTMENT (OUTPATIENT)
Dept: LAB | Facility: HOSPITAL | Age: 50
End: 2025-04-16
Payer: COMMERCIAL

## 2025-04-16 DIAGNOSIS — N91.2 AMENORRHEA: ICD-10-CM

## 2025-04-16 DIAGNOSIS — G35 MULTIPLE SCLEROSIS (HCC): ICD-10-CM

## 2025-04-16 LAB
25(OH)D3 SERPL-MCNC: 27 NG/ML (ref 30–100)
ALBUMIN SERPL BCG-MCNC: 4.6 G/DL (ref 3.5–5)
ALP SERPL-CCNC: 59 U/L (ref 34–104)
ALT SERPL W P-5'-P-CCNC: 9 U/L (ref 7–52)
ANION GAP SERPL CALCULATED.3IONS-SCNC: 11 MMOL/L (ref 4–13)
AST SERPL W P-5'-P-CCNC: 11 U/L (ref 13–39)
BASOPHILS # BLD AUTO: 0.04 THOUSANDS/ÂΜL (ref 0–0.1)
BASOPHILS NFR BLD AUTO: 1 % (ref 0–1)
BILIRUB SERPL-MCNC: 0.39 MG/DL (ref 0.2–1)
BUN SERPL-MCNC: 14 MG/DL (ref 5–25)
CALCIUM SERPL-MCNC: 9.4 MG/DL (ref 8.4–10.2)
CHLORIDE SERPL-SCNC: 102 MMOL/L (ref 96–108)
CO2 SERPL-SCNC: 25 MMOL/L (ref 21–32)
CREAT SERPL-MCNC: 1.15 MG/DL (ref 0.6–1.3)
EOSINOPHIL # BLD AUTO: 0.04 THOUSAND/ÂΜL (ref 0–0.61)
EOSINOPHIL NFR BLD AUTO: 1 % (ref 0–6)
ERYTHROCYTE [DISTWIDTH] IN BLOOD BY AUTOMATED COUNT: 12.2 % (ref 11.6–15.1)
FOLATE SERPL-MCNC: 11.7 NG/ML
GFR SERPL CREATININE-BSD FRML MDRD: 55 ML/MIN/1.73SQ M
GLUCOSE P FAST SERPL-MCNC: 89 MG/DL (ref 65–99)
HCT VFR BLD AUTO: 46.4 % (ref 34.8–46.1)
HGB BLD-MCNC: 15.1 G/DL (ref 11.5–15.4)
IMM GRANULOCYTES # BLD AUTO: 0.02 THOUSAND/UL (ref 0–0.2)
IMM GRANULOCYTES NFR BLD AUTO: 0 % (ref 0–2)
LYMPHOCYTES # BLD AUTO: 1.86 THOUSANDS/ÂΜL (ref 0.6–4.47)
LYMPHOCYTES NFR BLD AUTO: 27 % (ref 14–44)
MCH RBC QN AUTO: 31.1 PG (ref 26.8–34.3)
MCHC RBC AUTO-ENTMCNC: 32.5 G/DL (ref 31.4–37.4)
MCV RBC AUTO: 96 FL (ref 82–98)
MONOCYTES # BLD AUTO: 0.29 THOUSAND/ÂΜL (ref 0.17–1.22)
MONOCYTES NFR BLD AUTO: 4 % (ref 4–12)
NEUTROPHILS # BLD AUTO: 4.6 THOUSANDS/ÂΜL (ref 1.85–7.62)
NEUTS SEG NFR BLD AUTO: 67 % (ref 43–75)
NRBC BLD AUTO-RTO: 0 /100 WBCS
PLATELET # BLD AUTO: 277 THOUSANDS/UL (ref 149–390)
PMV BLD AUTO: 9.9 FL (ref 8.9–12.7)
POTASSIUM SERPL-SCNC: 3.7 MMOL/L (ref 3.5–5.3)
PROT SERPL-MCNC: 7.5 G/DL (ref 6.4–8.4)
RBC # BLD AUTO: 4.85 MILLION/UL (ref 3.81–5.12)
SODIUM SERPL-SCNC: 138 MMOL/L (ref 135–147)
TSH SERPL DL<=0.05 MIU/L-ACNC: 2.08 UIU/ML (ref 0.45–4.5)
VIT B12 SERPL-MCNC: 507 PG/ML (ref 180–914)
WBC # BLD AUTO: 6.85 THOUSAND/UL (ref 4.31–10.16)

## 2025-04-16 PROCEDURE — 82607 VITAMIN B-12: CPT

## 2025-04-16 PROCEDURE — 86803 HEPATITIS C AB TEST: CPT

## 2025-04-16 PROCEDURE — 84443 ASSAY THYROID STIM HORMONE: CPT

## 2025-04-16 PROCEDURE — 86618 LYME DISEASE ANTIBODY: CPT

## 2025-04-16 PROCEDURE — 86706 HEP B SURFACE ANTIBODY: CPT

## 2025-04-16 PROCEDURE — 86480 TB TEST CELL IMMUN MEASURE: CPT

## 2025-04-16 PROCEDURE — 86363 MOG-IGG1 ANTB FLO CYTMTRY EA: CPT

## 2025-04-16 PROCEDURE — 87389 HIV-1 AG W/HIV-1&-2 AB AG IA: CPT

## 2025-04-16 PROCEDURE — 82306 VITAMIN D 25 HYDROXY: CPT

## 2025-04-16 PROCEDURE — 86053 AQAPRN-4 ANTB FLO CYTMTRY EA: CPT

## 2025-04-16 PROCEDURE — 85025 COMPLETE CBC W/AUTO DIFF WBC: CPT

## 2025-04-16 PROCEDURE — 36415 COLL VENOUS BLD VENIPUNCTURE: CPT

## 2025-04-16 PROCEDURE — 82746 ASSAY OF FOLIC ACID SERUM: CPT

## 2025-04-16 PROCEDURE — 80053 COMPREHEN METABOLIC PANEL: CPT

## 2025-04-16 PROCEDURE — 86780 TREPONEMA PALLIDUM: CPT

## 2025-04-16 PROCEDURE — 87340 HEPATITIS B SURFACE AG IA: CPT

## 2025-04-16 PROCEDURE — 86711 JOHN CUNNINGHAM ANTIBODY: CPT

## 2025-04-17 LAB
B BURGDOR IGG+IGM SER QL IA: NEGATIVE
GAMMA INTERFERON BACKGROUND BLD IA-ACNC: 0.02 IU/ML
HBV SURFACE AB SER-ACNC: <3 MIU/ML
HBV SURFACE AG SER QL: NORMAL
HCV AB SER QL: NORMAL
HIV 1+2 AB+HIV1 P24 AG SERPL QL IA: NORMAL
M TB IFN-G BLD-IMP: NEGATIVE
M TB IFN-G CD4+ BCKGRND COR BLD-ACNC: 0 IU/ML
M TB IFN-G CD4+ BCKGRND COR BLD-ACNC: 0 IU/ML
MITOGEN IGNF BCKGRD COR BLD-ACNC: 9.98 IU/ML
TREPONEMA PALLIDUM IGG+IGM AB [PRESENCE] IN SERUM OR PLASMA BY IMMUNOASSAY: NORMAL

## 2025-04-21 LAB
Lab: NORMAL
Lab: NORMAL
MISCELLANEOUS LAB TEST RESULT: NORMAL

## 2025-04-22 ENCOUNTER — RESULTS FOLLOW-UP (OUTPATIENT)
Age: 50
End: 2025-04-22

## 2025-04-24 ENCOUNTER — TELEPHONE (OUTPATIENT)
Dept: FAMILY MEDICINE CLINIC | Facility: CLINIC | Age: 50
End: 2025-04-24

## 2025-04-24 DIAGNOSIS — G47.00 INSOMNIA, UNSPECIFIED TYPE: ICD-10-CM

## 2025-04-24 DIAGNOSIS — F33.1 MODERATE EPISODE OF RECURRENT MAJOR DEPRESSIVE DISORDER (HCC): ICD-10-CM

## 2025-04-24 LAB
Lab: NORMAL
MISCELLANEOUS LAB TEST RESULT: NORMAL
MISCELLANEOUS LAB TEST RESULT: NORMAL

## 2025-04-24 NOTE — TELEPHONE ENCOUNTER
Received medical records (VIA [employer]) request sent by [facility] office - [place] [facility] - Office of Long [facility] Living (LTL). Faxed to [facility], scanned to chart.

## 2025-04-25 RX ORDER — ARIPIPRAZOLE 30 MG/1
30 TABLET ORAL DAILY
Qty: 90 TABLET | Refills: 5 | Status: SHIPPED | OUTPATIENT
Start: 2025-04-25

## 2025-04-25 RX ORDER — TRAZODONE HYDROCHLORIDE 100 MG/1
200 TABLET ORAL
Qty: 180 TABLET | Refills: 1 | Status: SHIPPED | OUTPATIENT
Start: 2025-04-25

## 2025-05-05 ENCOUNTER — TELEPHONE (OUTPATIENT)
Dept: FAMILY MEDICINE CLINIC | Facility: CLINIC | Age: 50
End: 2025-05-05

## 2025-05-05 NOTE — TELEPHONE ENCOUNTER
Outreach for mammo scheduling made , patient expressed understanding . She will be scheduling . I will also be mailing referral out to patient as another reminder to complete

## 2025-05-07 DIAGNOSIS — R53.83 FATIGUE: ICD-10-CM

## 2025-05-07 RX ORDER — MODAFINIL 100 MG/1
100 TABLET ORAL DAILY
Qty: 30 TABLET | Refills: 0 | Status: SHIPPED | OUTPATIENT
Start: 2025-05-07

## 2025-05-07 NOTE — TELEPHONE ENCOUNTER
Medication: Provigil   PDMP  04/11/2025 04/11/2025 04/09/2025 Modafinil (Tablet) 30.0 30 100 MG NA ALEXA RUTLEDGE [facility] Private Pay 0 / 0 [place]   1 [id number] 04/07/2025 04/02/2025 04/01/2025 LORazepam (Tablet) 120.0 30 2 MG NA [facility] Private Pay 0 / 0 [place]   1 [id number] 03/03/2025 03/01/2025 02/25/2025 LORazepam (Tablet) 120.0 30 2 MG NA [facility] Private Pay 0 / 0 [place]   1 [id number] 01/30/2025 01/26/2025 12/26/2024 LORazepam (Tablet) 90.0 30 2 MG NA [facility] Commercial Insurance 0 / 0 [place]   Active agreement on file -No

## 2025-05-07 NOTE — TELEPHONE ENCOUNTER
Reason for call:   [x] Refill   [] Prior Auth  [] Other:     Office: [facility]   [] PCP/Provider -   [x] Specialty/Provider - Neurology/ Hammad Vargas     Medication: modafinil (PROVIGIL)     Dose/Frequency: 100 mg/ daily     Quantity: 30 day supply     Pharmacy: [facility] in [place]     Local Pharmacy   Does the patient have enough for 3 days?   [] Yes   [x] No - Send as HP to POD    Mail Away Pharmacy   Does the patient have enough for 10 days?   [] Yes   [] No - Send as HP to POD

## 2025-05-08 RX ORDER — MODAFINIL 100 MG/1
100 TABLET ORAL DAILY
Qty: 30 TABLET | Refills: 0 | OUTPATIENT
Start: 2025-05-08

## 2025-05-08 NOTE — TELEPHONE ENCOUNTER
Patient called back stating that the pharmacy does have the script however they are telling her that she cannot have it until the 12th. Informed her that the provider did not put a start date on it so if the pharmacy is telling her that, then her insurance is the one telling her that she is to soon. Patient will contact her insurance.

## 2025-05-08 NOTE — TELEPHONE ENCOUNTER
Patient called requesting refill for provigil. Patient made aware medication was refilled on 5/7/25 for 30 with 0 refills to [facility] pharmacy. Patient instructed to contact the pharmacy and speak with someone directly to obtain refills of medication. Patient advised to call back for refill if their pharmacy is unable to assist them. Patient verbalized understanding.

## 2025-05-12 ENCOUNTER — TELEPHONE (OUTPATIENT)
Age: 50
End: 2025-05-12

## 2025-05-12 NOTE — LETTER
"    URGENT                URGENT                URGENT                URGENT    May 13, 2025     Patient: Love Rosario  YOB: 1975  Date:     5/12/2025    Patient ID #: [id number]  RE: Clinical Information for Modafinil [place] Denial     Patient is A 49 year old female that was daignosed with Multiple Sclerosis over 15 years ago. Patient was prescribed Modafinil by her Neurologist to help manage patient's MS related fatigue. Modafinil is a medication that promotes wakefulness and can be helpful in counteracting daytime sleepiness that is associated with MS-related fatigue. Modafinil was prescribed by patient's Neurologist. Patient's neurologist had a phone conversation with patient and assessed possible risks for misuse, abuse, or addiction based on pt's family and social history; educated patient on the possible side effects/risks with taking this type of medication, and educated patient not to take Modafinil with any drugs that are known as sedative hypnotics medications.      From telephone encounter that discussed Modafinil  5/13/25 between Neurologist and patient:  \"I called Ms. Rosario on their phone number listed in EPIC at 1:45 PM.      I discussed with Ms. Rosario about the possible risks for misuse, abuse, or addiction TELbased on pt's family and social history. No concerning issues were noted.      I provided education to Ms. Rosario on the possible side effects and risks with taking this type of drug (including abuse, misuse, and addiction).      I also educated the patient that she should not be taking Provigil with sedative hypnotic agent.     All questions were answered, patient expressed understanding.      Hammad Vargas MD.   Staff Neurologist\"            Completed by LILIAN Ordonez RN, on behalf of Dr. Vargas, [facility] Neurologist     "

## 2025-05-12 NOTE — TELEPHONE ENCOUNTER
pt called and states that she paid $80 for provigil and that provigil needs PA.    she did  med and has a 30 day supply but PA is needed.   Confirmed with her that her insurance is [employer]    please assist with PA  [phone] to leave detailed message

## 2025-05-12 NOTE — LETTER
"  URGENT                URGENT                URGENT                URGENT    May 13, 2025     Patient: Love Rosario  YOB: 1975  Date:     5/12/2025    Patient ID #: [id number]  RE: Clinical Information for Modafinil [place] Denial     Patient is A 49 year old female that was daignosed with Multiple Sclerosis over 15 years ago. Patient was prescribed Modafinil by her Neurologist to help manage patient's MS related fatigue. Modafinil is a medication that promotes wakefulness and can be helpful in counteracting daytime sleepiness that is associated with MS-related fatigue. Modafinil was prescribed by patient's Neurologist. Patient's neurologist had a phone conversation with patient and assessed possible risks for misuse, abuse, or addiction based on pt's family and social history; educated patient on the possible side effects/risks with taking this type of medication, and educated patient not to take Modafinil with any drugs that are known as sedative hypnotics medications.        From telephone encounter that discussed Modafinil  5/13/25 between Neurologist and patient:  \"I called Ms. Rosario on their phone number listed in EPIC at 1:45 PM.      I discussed with Ms. Rosario about the possible risks for misuse, abuse, or addiction TELbased on pt's family and social history. No concerning issues were noted.      I provided education to Ms. Rosario on the possible side effects and risks with taking this type of drug (including abuse, misuse, and addiction).      I also educated the patient that she should not be taking Provigil with sedative hypnotic agent.     All questions were answered, patient expressed understanding.      Hammad Vargas MD.   Staff Neurologist\"              Completed by LILIAN Ordonez RN, on behalf of Dr. Vargas, [facility] Neurologist     "

## 2025-05-13 NOTE — TELEPHONE ENCOUNTER
Thank you Lorraine,     Can we discuss these points with the patient and document in a TE, and re submit the PA then?    If there would be concern from her insurance, I would be happy to call and speak with the patient.     Thank you,     Dr. Sam MD.   05/13/25   12:46 PM

## 2025-05-13 NOTE — TELEPHONE ENCOUNTER
Dr. Vargas: yes you can speak with the patient and discuss the information below. We can then take that note and fax it to the insurance to request another review.

## 2025-05-13 NOTE — TELEPHONE ENCOUNTER
Recd denial letter for Modafinil; letter scanned in media.     Denied due to:  Provider assessed possible risks for misuse, abuse, or addiction based on pt's family and social history.     2. Provider educated pt on the possible side effects and risks with taking this type of drug (including abuse, misuse, and addiction).     3. Provider needs to state that patient will not be taking this medication with a drug known as a sedative hypnotic (drugs used for sleep).     Per LOV: discussion about  steroids, etc but did not see mention of Modafinil and the information listed above for med.    Please advise.

## 2025-05-13 NOTE — TELEPHONE ENCOUNTER
TELEPHONE ENCOUNTER:      I called Ms. Rosario on their phone number listed in EPIC at 1:45 PM.     I discussed with Ms. Rosario about the possible risks for misuse, abuse, or addiction based on pt's family and social history. No concerning issues were noted.      I provided education to Ms. Rosario on the possible side effects and risks with taking this type of drug (including abuse, misuse, and addiction).      I also educated the patient that she should not be taking Provigil with sedative hypnotic agent.       All questions were answered, patient expressed understanding.       Hammad Vargas MD.   Staff Neurologist  05/13/25   1:45 PM

## 2025-05-13 NOTE — TELEPHONE ENCOUNTER
Letter written with provider's message below and faxed to [facility] [place] Denials Dept. [phone] (f).     Waiting for determination

## 2025-05-14 NOTE — TELEPHONE ENCOUNTER
Gold from [employer] called stating they received the appeal grievance for this patient. The member gave consent. The case will be given to a . The coordinator may be in contact with us if more information is needed.

## 2025-05-14 NOTE — TELEPHONE ENCOUNTER
[employer] representative called to make us aware the Modafinil has been approved. The approval is for 12 refills/months. [employer] did not have a confirmation number. Patient was already made aware by [employer].    Outbound call made to pharmacy and they were made aware.

## 2025-05-15 ENCOUNTER — HOSPITAL ENCOUNTER (OUTPATIENT)
Dept: MRI IMAGING | Facility: HOSPITAL | Age: 50
End: 2025-05-15
Attending: PSYCHIATRY & NEUROLOGY
Payer: COMMERCIAL

## 2025-05-15 DIAGNOSIS — G35 MULTIPLE SCLEROSIS (HCC): ICD-10-CM

## 2025-05-15 PROCEDURE — A9585 GADOBUTROL INJECTION: HCPCS | Performed by: PSYCHIATRY & NEUROLOGY

## 2025-05-15 PROCEDURE — 72157 MRI CHEST SPINE W/O & W/DYE: CPT

## 2025-05-15 RX ORDER — GADOBUTROL 604.72 MG/ML
5 INJECTION INTRAVENOUS
Status: COMPLETED | OUTPATIENT
Start: 2025-05-15 | End: 2025-05-15

## 2025-05-15 RX ADMIN — GADOBUTROL 5 ML: 604.72 INJECTION INTRAVENOUS at 14:29

## 2025-05-17 DIAGNOSIS — F41.9 ANXIETY: ICD-10-CM

## 2025-05-19 RX ORDER — LORAZEPAM 2 MG/1
2 TABLET ORAL EVERY 6 HOURS PRN
Qty: 120 TABLET | Refills: 0 | Status: SHIPPED | OUTPATIENT
Start: 2025-05-19

## 2025-05-19 NOTE — TELEPHONE ENCOUNTER
Patient called in because she contacted the pharmacy and her Lorazepam still wasn't sent in. I advised her it as sent over to the covering doctor this morning and that I would send a follow up message and she verbalized understanding.

## 2025-05-27 ENCOUNTER — RA CDI HCC (OUTPATIENT)
Dept: OTHER | Facility: HOSPITAL | Age: 50
End: 2025-05-27

## 2025-05-27 NOTE — PROGRESS NOTES
HCC coding opportunities          Chart Reviewed number of suggestions sent to Provider: 1     Patients Insurance        Commercial Insurance: [employer]       [employer] audit     R32 (Unspecified urinary incontinence)     Found in active problem list - please review and assess and document per MEAT if applicable for 2025

## 2025-05-28 ENCOUNTER — OFFICE VISIT (OUTPATIENT)
Dept: FAMILY MEDICINE CLINIC | Facility: CLINIC | Age: 50
End: 2025-05-28
Payer: COMMERCIAL

## 2025-05-28 VITALS
WEIGHT: 115 LBS | OXYGEN SATURATION: 96 % | SYSTOLIC BLOOD PRESSURE: 104 MMHG | BODY MASS INDEX: 26.61 KG/M2 | DIASTOLIC BLOOD PRESSURE: 78 MMHG | HEART RATE: 86 BPM | RESPIRATION RATE: 18 BRPM | HEIGHT: 55 IN

## 2025-05-28 DIAGNOSIS — G20.A1 PARKINSON'S DISEASE, UNSPECIFIED WHETHER DYSKINESIA PRESENT, UNSPECIFIED WHETHER MANIFESTATIONS FLUCTUATE (HCC): ICD-10-CM

## 2025-05-28 DIAGNOSIS — N18.31 STAGE 3A CHRONIC KIDNEY DISEASE (HCC): ICD-10-CM

## 2025-05-28 DIAGNOSIS — G35 MULTIPLE SCLEROSIS (HCC): ICD-10-CM

## 2025-05-28 DIAGNOSIS — F33.1 MODERATE EPISODE OF RECURRENT MAJOR DEPRESSIVE DISORDER (HCC): ICD-10-CM

## 2025-05-28 DIAGNOSIS — E78.2 MIXED HYPERLIPIDEMIA: ICD-10-CM

## 2025-05-28 DIAGNOSIS — E56.9 VITAMIN DEFICIENCY: ICD-10-CM

## 2025-05-28 DIAGNOSIS — F41.9 ANXIETY: ICD-10-CM

## 2025-05-28 DIAGNOSIS — G43.909 MIGRAINE WITHOUT STATUS MIGRAINOSUS, NOT INTRACTABLE, UNSPECIFIED MIGRAINE TYPE: Primary | ICD-10-CM

## 2025-05-28 DIAGNOSIS — Z12.11 COLON CANCER SCREENING: ICD-10-CM

## 2025-05-28 DIAGNOSIS — K58.9 IRRITABLE BOWEL SYNDROME, UNSPECIFIED TYPE: ICD-10-CM

## 2025-05-28 PROBLEM — S20.221A CONTUSION OF RIGHT SIDE OF BACK: Status: RESOLVED | Noted: 2024-05-09 | Resolved: 2025-05-28

## 2025-05-28 PROCEDURE — 99214 OFFICE O/P EST MOD 30 MIN: CPT | Performed by: FAMILY MEDICINE

## 2025-05-28 RX ORDER — ARIPIPRAZOLE 30 MG/1
30 TABLET ORAL DAILY
Qty: 90 TABLET | Refills: 5 | Status: SHIPPED | OUTPATIENT
Start: 2025-05-28

## 2025-05-28 NOTE — PROGRESS NOTES
Depression Screening and Follow-up Plan: Patient was screened for depression during today's encounter. They screened negative with a PHQ-9 score of 2.      Assessment/Plan:    Return visit in 6 months     Problem List Items Addressed This Visit       Anxiety    Continue Ativan 2 mg 4 times daily         Irritable bowel syndrome    Migraine headache - Primary    Mixed hyperlipidemia    Continue Crestor 10 mg daily         Moderate episode of recurrent major depressive disorder (HCC)    Relevant Medications    ARIPiprazole (ABILIFY) 30 mg tablet    Multiple sclerosis (HCC)    Parkinson's disease (HCC)    Continue Sinemet  3 times daily         Relevant Medications    ARIPiprazole (ABILIFY) 30 mg tablet    Stage 3a chronic kidney disease (HCC)    Lab Results   Component Value Date    EGFR 55 04/16/2025    EGFR 54 02/10/2025    EGFR 53 (L) 12/31/2024    EGFR 53 (L) 12/31/2024    CREATININE 1.15 04/16/2025    CREATININE 1.17 02/10/2025    CREATININE 1.23 (H) 12/31/2024    CREATININE 1.23 (H) 12/31/2024   Avoid NSAIDs         Vitamin deficiency     Other Visit Diagnoses         Colon cancer screening        Relevant Orders    Ambulatory Referral to Gastroenterology              Subjective:      Patient ID: Love Rosario is a 49 y.o. female.    Patient comes in for checkup.  She is having a flare of her MS and is seeing neurology for this.  She also was recently diagnosed with Parkinson's disease.  Her depression is stable at this time.        The following portions of the patient's history were reviewed and updated as appropriate:   Past Medical History:  She has a past medical history of Anxiety (Years), Depression (Years), Headache(784.0) (Years), Memory loss (5 months), MS (multiple sclerosis) (HCC), and Visual impairment (Years).,  _______________________________________________________________________  Medical Problems:  does not have any pertinent problems on  "file.,  _______________________________________________________________________  Past Surgical History:   has a past surgical history that includes Eye surgery.,  _______________________________________________________________________  Family History:  family history includes Anxiety disorder in her sister; Arthritis in her father; Asthma in her mother; COPD in her mother; Cancer in her father and sister; Coronary artery disease in her mother; Dementia in her father; Hearing loss in her father; Heart attack in her mother; Mental illness in her father; Substance Abuse in her mother; Vision loss in her father.,  _______________________________________________________________________  Social History:   reports that she quit smoking about 16 months ago. Her smoking use included cigarettes. She started smoking about 35 years ago. She has a 17 pack-year smoking history. She has never used smokeless tobacco. She reports that she does not drink alcohol and does not use drugs.,  _______________________________________________________________________  Allergies:  is allergic to sulfa antibiotics..  _______________________________________________________________________  Current Medications[1]  _______________________________________________________________________  Review of Systems   Constitutional: Negative.    Respiratory: Negative.     Cardiovascular: Negative.    Musculoskeletal:  Positive for gait problem.   Neurological:  Positive for weakness and numbness.   Psychiatric/Behavioral: Negative.           Objective:  Vitals:    05/28/25 1133   BP: 104/78   BP Location: Left arm   Patient Position: Sitting   Cuff Size: Standard   Pulse: 86   Resp: 18   SpO2: 96%   Weight: 52.2 kg (115 lb)   Height: 4' 7\" (1.397 m)     Body mass index is 26.73 kg/m².     Physical Exam  Constitutional:       Appearance: Normal appearance. She is well-developed.   HENT:      Head: Normocephalic and atraumatic.      Right Ear: External ear " normal.      Left Ear: External ear normal.     Eyes:      Pupils: Pupils are equal, round, and reactive to light.     Neck:      Thyroid: No thyromegaly.     Cardiovascular:      Rate and Rhythm: Normal rate and regular rhythm.      Heart sounds: Normal heart sounds.   Pulmonary:      Effort: Pulmonary effort is normal.      Breath sounds: Normal breath sounds.   Abdominal:      Palpations: Abdomen is soft.     Musculoskeletal:         General: Normal range of motion.      Cervical back: Neck supple.   Lymphadenopathy:      Cervical: No cervical adenopathy.     Skin:     General: Skin is warm and dry.     Neurological:      Mental Status: She is alert.     Psychiatric:         Mood and Affect: Mood normal.         Behavior: Behavior normal.                [1]   Current Outpatient Medications   Medication Sig Dispense Refill    ARIPiprazole (ABILIFY) 30 mg tablet Take 1 tablet (30 mg total) by mouth daily 90 tablet 5    carbidopa-levodopa (Sinemet)  mg per tablet Start with one tab in the evening and then increase the dose to one in the morning and one in the evening in 7 days 60 tablet 3    cyclobenzaprine (FLEXERIL) 10 mg tablet Take 1 tablet (10 mg total) by mouth 3 (three) times a day as needed for muscle spasms 90 tablet 5    diclofenac sodium (VOLTAREN) 50 mg EC tablet Take 1 tablet (50 mg total) by mouth 2 (two) times a day 30 tablet 1    dicyclomine (BENTYL) 20 mg tablet Take 1 tablet (20 mg total) by mouth 4 (four) times a day as needed (Abdominal pain) 30 tablet 0    estradiol (ESTRACE) 0.1 mg/g vaginal cream       gabapentin (NEURONTIN) 300 mg capsule TAKE 1 CAPSULE (300 MG TOTAL) BY MOUTH 3 (THREE) TIMES A DAY 90 capsule 0    LORazepam (ATIVAN) 2 mg tablet Take 1 tablet (2 mg total) by mouth every 6 (six) hours as needed for anxiety 120 tablet 0    modafinil (PROVIGIL) 100 mg tablet Take 1 tablet (100 mg total) by mouth daily 30 tablet 0    ondansetron (ZOFRAN) 4 mg tablet Take 1 tablet (4 mg  total) by mouth every 8 (eight) hours as needed for nausea or vomiting 20 tablet 0    oxybutynin (DITROPAN-XL) 5 mg 24 hr tablet TAKE 1 TABLET (5 MG TOTAL) BY MOUTH DAILY 90 tablet 1    rosuvastatin (CRESTOR) 10 MG tablet Take 1 tablet (10 mg total) by mouth daily 30 tablet 0    sertraline (ZOLOFT) 100 mg tablet Take 1 tablet (100 mg total) by mouth daily 30 tablet 5    traZODone (DESYREL) 100 mg tablet Take 2 tablets (200 mg total) by mouth daily at bedtime 180 tablet 1     No current facility-administered medications for this visit.

## 2025-05-28 NOTE — ASSESSMENT & PLAN NOTE
Lab Results   Component Value Date    EGFR 55 04/16/2025    EGFR 54 02/10/2025    EGFR 53 (L) 12/31/2024    EGFR 53 (L) 12/31/2024    CREATININE 1.15 04/16/2025    CREATININE 1.17 02/10/2025    CREATININE 1.23 (H) 12/31/2024    CREATININE 1.23 (H) 12/31/2024   Avoid NSAIDs

## 2025-06-03 DIAGNOSIS — G35 MULTIPLE SCLEROSIS (HCC): ICD-10-CM

## 2025-06-03 RX ORDER — CARBIDOPA AND LEVODOPA 25; 100 MG/1; MG/1
TABLET ORAL
Qty: 60 TABLET | Refills: 3 | OUTPATIENT
Start: 2025-06-03

## 2025-06-04 DIAGNOSIS — R53.83 FATIGUE: ICD-10-CM

## 2025-06-04 DIAGNOSIS — R23.2 HOT FLASHES: ICD-10-CM

## 2025-06-05 RX ORDER — GABAPENTIN 300 MG/1
300 CAPSULE ORAL 3 TIMES DAILY
Qty: 90 CAPSULE | Refills: 0 | Status: SHIPPED | OUTPATIENT
Start: 2025-06-05

## 2025-06-05 RX ORDER — MODAFINIL 100 MG/1
100 TABLET ORAL DAILY
Qty: 30 TABLET | Refills: 0 | Status: SHIPPED | OUTPATIENT
Start: 2025-06-05

## 2025-06-05 NOTE — TELEPHONE ENCOUNTER
Refill must be reviewed and completed by the office or provider. The refill is unable to be approved or denied by the medication management team.        Patient Id Prescription # Sold Filled Written Drug Label Qty Days Strength MME* Prescriber Pharmacy Payment REFILL #/Auth State Detail  1 [id number] 05/20/2025 05/20/2025 05/19/2025 LORazepam (Tablet) 120.0 30 2 MG NA ABHIJIT BAPTISTE [facility] Private Pay 0 / 0 PA   1 [id number] 05/12/2025 05/12/2025 05/07/2025 Modafinil (Tablet) 30.0 30 100 MG NA ALEXA [facility] Private Pay 0 / 0 PA   1 [id number] 04/11/2025 04/11/2025 04/09/2025 Modafinil (Tablet) 30.0 30 100 MG NA ALEXA RUTLEDGE [facility] Private Pay 0 / 0 PA   1 [id number] 04/07/2025 04/02/2025 04/01/2025 LORazepam (Tablet) 120.0 30 2 MG NA WILLIAM BURNHAM [facility] Private Pay 0 / 0 PA

## 2025-06-09 ENCOUNTER — TELEPHONE (OUTPATIENT)
Age: 50
End: 2025-06-09

## 2025-06-09 NOTE — TELEPHONE ENCOUNTER
Dr. Vargas requesting that office can assist with getting pt started on Zeposia for her MS. (Discussed in results FU message dated 4/16/25).

## 2025-06-11 DIAGNOSIS — F41.9 ANXIETY: ICD-10-CM

## 2025-06-11 RX ORDER — SERTRALINE HYDROCHLORIDE 100 MG/1
100 TABLET, FILM COATED ORAL DAILY
Qty: 30 TABLET | Refills: 5 | Status: SHIPPED | OUTPATIENT
Start: 2025-06-11

## 2025-06-12 ENCOUNTER — TELEPHONE (OUTPATIENT)
Dept: NEUROLOGY | Facility: CLINIC | Age: 50
End: 2025-06-12

## 2025-06-12 DIAGNOSIS — G35 MULTIPLE SCLEROSIS (HCC): Primary | ICD-10-CM

## 2025-06-12 NOTE — TELEPHONE ENCOUNTER
Zeposia start.     CBC and LFTs complete.     Dr. Vargas, no recent ECG or VZV antibody results on file. Would you like to order this?

## 2025-06-12 NOTE — LETTER
Love Rosario  [address] [place] [address]      Our office has been unsuccessful in trying to reach you by phone regarding:      ? Your medication or prescriptions        Please contact our office at your earliest convenience.  Please call [phone]  and follow the prompts.      Sincerely,       [facility]

## 2025-06-13 NOTE — TELEPHONE ENCOUNTER
Yes please. I would like to get EKG and VZV Ab testing.     Labs and EKG ordered.     1. Multiple sclerosis (HCC) (Primary)    - ECG 12 lead; Future  - Varicella zoster antibody, IgG; Future  - Varicella zoster antibody, IgM; Future        Hammad Vargas MD.   Staff Neurologist  06/13/25   9:06 AM

## 2025-06-14 DIAGNOSIS — F41.9 ANXIETY: ICD-10-CM

## 2025-06-16 RX ORDER — LORAZEPAM 2 MG/1
2 TABLET ORAL EVERY 6 HOURS PRN
Qty: 120 TABLET | Refills: 3 | Status: SHIPPED | OUTPATIENT
Start: 2025-06-16

## 2025-06-17 NOTE — TELEPHONE ENCOUNTER
Pt read message:    Last read by Jemima Rosario at 10:12AM on 6/14/2025.       Awaiting test results.

## 2025-06-28 DIAGNOSIS — G35 MULTIPLE SCLEROSIS (HCC): ICD-10-CM

## 2025-06-30 RX ORDER — CARBIDOPA AND LEVODOPA 25; 100 MG/1; MG/1
TABLET ORAL
Qty: 60 TABLET | Refills: 3 | Status: SHIPPED | OUTPATIENT
Start: 2025-06-30

## 2025-07-09 DIAGNOSIS — R11.0 NAUSEA: ICD-10-CM

## 2025-07-09 DIAGNOSIS — R53.83 FATIGUE: ICD-10-CM

## 2025-07-09 DIAGNOSIS — K58.9 IRRITABLE BOWEL SYNDROME, UNSPECIFIED TYPE: ICD-10-CM

## 2025-07-09 RX ORDER — MODAFINIL 100 MG/1
100 TABLET ORAL DAILY
Qty: 30 TABLET | Refills: 0 | Status: SHIPPED | OUTPATIENT
Start: 2025-07-09

## 2025-07-09 NOTE — TELEPHONE ENCOUNTER
Medication: Provigil   PDMP  06/20/2025 06/19/2025 06/16/2025 LORazepam (Tablet) 120.0 30 2 MG NA WILLIAM BURNHAM [facility] Private Pay 0 / 3 PA   1 [id number] 06/13/2025 06/12/2025 06/05/2025 Modafinil (Tablet) 30.0 30 100 MG NA ALEXA RUTLEDGE [facility] Medicaid 0 / 0 PA   1 [id number] 05/20/2025 05/20/2025 05/19/2025 LORazepam (Tablet) 120.0 30 2 MG NA ABHIJIT BAPTISTE [facility] Private Pay 0 / 0 PA   1 [id number] 05/12/2025 05/12/2025 05/07/2025 Modafinil (Tablet) 30.0 30 100 MG NA ALEXA RUTLEDGE [facility] Private Pay 0 / 0 PA   1 [id number] 04/11/2025 04/11/2025 04/09/2025 Modafinil (Tablet) 30.0 30 100 MG NA ALEXA RUTLEDGE   Active agreement on file -Yes

## 2025-07-12 RX ORDER — DICYCLOMINE HCL 20 MG
20 TABLET ORAL 4 TIMES DAILY PRN
Qty: 30 TABLET | Refills: 5 | Status: SHIPPED | OUTPATIENT
Start: 2025-07-12

## 2025-07-12 RX ORDER — ONDANSETRON 4 MG/1
4 TABLET, FILM COATED ORAL EVERY 8 HOURS PRN
Qty: 20 TABLET | Refills: 5 | Status: SHIPPED | OUTPATIENT
Start: 2025-07-12

## 2025-07-14 DIAGNOSIS — R23.2 HOT FLASHES: ICD-10-CM

## 2025-07-16 RX ORDER — GABAPENTIN 300 MG/1
300 CAPSULE ORAL 3 TIMES DAILY
Qty: 90 CAPSULE | Refills: 5 | Status: SHIPPED | OUTPATIENT
Start: 2025-07-16

## 2025-07-22 NOTE — TELEPHONE ENCOUNTER
Called and Left a message on pt's answering machine for a call back. 3rd attempt.     Letter generated, please mail to pt. Thanks!    FYI- pt has not responded to office. Unable to complete start process for Zeposia.

## 2025-07-30 NOTE — TELEPHONE ENCOUNTER
Patient calling as requested in the letter she received from our office.    I advised labs and EKG must be completed prior to starting zeposia and orders are already placed in her chart.    She verbalized understanding and will have them done; will call our office when completed.    Nothing further needed at this time.

## 2025-08-01 DIAGNOSIS — F41.9 ANXIETY: ICD-10-CM

## 2025-08-01 DIAGNOSIS — K58.9 IRRITABLE BOWEL SYNDROME, UNSPECIFIED TYPE: ICD-10-CM

## 2025-08-01 DIAGNOSIS — G47.00 INSOMNIA, UNSPECIFIED TYPE: ICD-10-CM

## 2025-08-03 RX ORDER — LORAZEPAM 2 MG/1
2 TABLET ORAL EVERY 6 HOURS PRN
Qty: 120 TABLET | Refills: 0 | Status: SHIPPED | OUTPATIENT
Start: 2025-08-03

## 2025-08-04 DIAGNOSIS — R53.83 FATIGUE: ICD-10-CM

## 2025-08-04 DIAGNOSIS — G35 MULTIPLE SCLEROSIS (HCC): ICD-10-CM

## 2025-08-04 RX ORDER — TRAZODONE HYDROCHLORIDE 100 MG/1
200 TABLET ORAL
Qty: 180 TABLET | Refills: 1 | Status: SHIPPED | OUTPATIENT
Start: 2025-08-04

## 2025-08-04 RX ORDER — DICYCLOMINE HCL 20 MG
20 TABLET ORAL 4 TIMES DAILY PRN
Qty: 30 TABLET | Refills: 0 | OUTPATIENT
Start: 2025-08-04

## 2025-08-05 RX ORDER — MODAFINIL 100 MG/1
100 TABLET ORAL DAILY
Qty: 30 TABLET | Refills: 0 | Status: SHIPPED | OUTPATIENT
Start: 2025-08-05

## 2025-08-05 RX ORDER — CARBIDOPA AND LEVODOPA 25; 100 MG/1; MG/1
TABLET ORAL
Qty: 60 TABLET | Refills: 5 | Status: SHIPPED | OUTPATIENT
Start: 2025-08-05

## 2025-08-07 DIAGNOSIS — R11.0 NAUSEA: ICD-10-CM

## 2025-08-08 RX ORDER — ONDANSETRON 4 MG/1
4 TABLET, FILM COATED ORAL EVERY 8 HOURS PRN
Qty: 20 TABLET | Refills: 5 | Status: SHIPPED | OUTPATIENT
Start: 2025-08-08

## 2025-08-11 ENCOUNTER — OFFICE VISIT (OUTPATIENT)
Age: 50
End: 2025-08-11
Payer: COMMERCIAL

## 2025-08-12 ENCOUNTER — TELEPHONE (OUTPATIENT)
Dept: NEUROLOGY | Facility: CLINIC | Age: 50
End: 2025-08-12

## 2025-08-12 DIAGNOSIS — G35 MULTIPLE SCLEROSIS (HCC): Primary | ICD-10-CM

## 2025-08-14 RX ORDER — OFATUMUMAB 20 MG/.4ML
INJECTION, SOLUTION SUBCUTANEOUS
Qty: 1.2 ML | Refills: 0 | Status: SHIPPED | OUTPATIENT
Start: 2025-08-14

## 2025-08-14 RX ORDER — OFATUMUMAB 20 MG/.4ML
20 INJECTION, SOLUTION SUBCUTANEOUS
Qty: 0.4 ML | Refills: 11 | Status: SHIPPED | OUTPATIENT
Start: 2025-08-14